# Patient Record
Sex: FEMALE | Race: BLACK OR AFRICAN AMERICAN | NOT HISPANIC OR LATINO | Employment: OTHER | URBAN - METROPOLITAN AREA
[De-identification: names, ages, dates, MRNs, and addresses within clinical notes are randomized per-mention and may not be internally consistent; named-entity substitution may affect disease eponyms.]

---

## 2021-06-16 ENCOUNTER — OFFICE VISIT (OUTPATIENT)
Dept: PRIMARY CARE CLINIC | Facility: CLINIC | Age: 81
End: 2021-06-16
Payer: MEDICARE

## 2021-06-16 ENCOUNTER — LAB VISIT (OUTPATIENT)
Dept: LAB | Facility: HOSPITAL | Age: 81
End: 2021-06-16
Attending: FAMILY MEDICINE
Payer: MEDICARE

## 2021-06-16 VITALS
HEIGHT: 67 IN | WEIGHT: 151 LBS | HEART RATE: 75 BPM | DIASTOLIC BLOOD PRESSURE: 67 MMHG | BODY MASS INDEX: 23.7 KG/M2 | SYSTOLIC BLOOD PRESSURE: 156 MMHG | OXYGEN SATURATION: 98 % | TEMPERATURE: 98 F

## 2021-06-16 DIAGNOSIS — N18.31 STAGE 3A CHRONIC KIDNEY DISEASE: ICD-10-CM

## 2021-06-16 DIAGNOSIS — I15.2 HYPERTENSION ASSOCIATED WITH TYPE 2 DIABETES MELLITUS: Primary | ICD-10-CM

## 2021-06-16 DIAGNOSIS — E11.69 CONTROLLED TYPE 2 DIABETES MELLITUS WITH OTHER SPECIFIED COMPLICATION, WITHOUT LONG-TERM CURRENT USE OF INSULIN: ICD-10-CM

## 2021-06-16 DIAGNOSIS — E11.59 HYPERTENSION ASSOCIATED WITH TYPE 2 DIABETES MELLITUS: Primary | ICD-10-CM

## 2021-06-16 DIAGNOSIS — E11.42 DM TYPE 2 WITH DIABETIC PERIPHERAL NEUROPATHY: ICD-10-CM

## 2021-06-16 DIAGNOSIS — I10 WHITE COAT SYNDROME WITH DIAGNOSIS OF HYPERTENSION: ICD-10-CM

## 2021-06-16 DIAGNOSIS — E11.59 HYPERTENSION ASSOCIATED WITH TYPE 2 DIABETES MELLITUS: ICD-10-CM

## 2021-06-16 DIAGNOSIS — I15.2 HYPERTENSION ASSOCIATED WITH TYPE 2 DIABETES MELLITUS: ICD-10-CM

## 2021-06-16 DIAGNOSIS — E78.2 MIXED HYPERLIPIDEMIA: ICD-10-CM

## 2021-06-16 DIAGNOSIS — I10 ESSENTIAL HYPERTENSION: ICD-10-CM

## 2021-06-16 LAB
BASOPHILS # BLD AUTO: 0.02 K/UL (ref 0–0.2)
BASOPHILS NFR BLD: 0.3 % (ref 0–1.9)
DIFFERENTIAL METHOD: ABNORMAL
EOSINOPHIL # BLD AUTO: 0.1 K/UL (ref 0–0.5)
EOSINOPHIL NFR BLD: 0.8 % (ref 0–8)
ERYTHROCYTE [DISTWIDTH] IN BLOOD BY AUTOMATED COUNT: 12.9 % (ref 11.5–14.5)
ESTIMATED AVG GLUCOSE: 194 MG/DL (ref 68–131)
HBA1C MFR BLD: 8.4 % (ref 4–5.6)
HCT VFR BLD AUTO: 34.9 % (ref 37–48.5)
HGB BLD-MCNC: 11.2 G/DL (ref 12–16)
IMM GRANULOCYTES # BLD AUTO: 0.03 K/UL (ref 0–0.04)
IMM GRANULOCYTES NFR BLD AUTO: 0.5 % (ref 0–0.5)
LYMPHOCYTES # BLD AUTO: 1.8 K/UL (ref 1–4.8)
LYMPHOCYTES NFR BLD: 28 % (ref 18–48)
MCH RBC QN AUTO: 29.6 PG (ref 27–31)
MCHC RBC AUTO-ENTMCNC: 32.1 G/DL (ref 32–36)
MCV RBC AUTO: 92 FL (ref 82–98)
MONOCYTES # BLD AUTO: 0.4 K/UL (ref 0.3–1)
MONOCYTES NFR BLD: 6.4 % (ref 4–15)
NEUTROPHILS # BLD AUTO: 4.1 K/UL (ref 1.8–7.7)
NEUTROPHILS NFR BLD: 64 % (ref 38–73)
NRBC BLD-RTO: 0 /100 WBC
PLATELET # BLD AUTO: 258 K/UL (ref 150–450)
PMV BLD AUTO: 10.4 FL (ref 9.2–12.9)
RBC # BLD AUTO: 3.79 M/UL (ref 4–5.4)
WBC # BLD AUTO: 6.39 K/UL (ref 3.9–12.7)

## 2021-06-16 PROCEDURE — 84443 ASSAY THYROID STIM HORMONE: CPT | Performed by: FAMILY MEDICINE

## 2021-06-16 PROCEDURE — 99999 PR PBB SHADOW E&M-NEW PATIENT-LVL III: CPT | Mod: PBBFAC,,, | Performed by: FAMILY MEDICINE

## 2021-06-16 PROCEDURE — 99999 PR PBB SHADOW E&M-NEW PATIENT-LVL III: ICD-10-PCS | Mod: PBBFAC,,, | Performed by: FAMILY MEDICINE

## 2021-06-16 PROCEDURE — 36415 COLL VENOUS BLD VENIPUNCTURE: CPT | Mod: PN | Performed by: FAMILY MEDICINE

## 2021-06-16 PROCEDURE — 85025 COMPLETE CBC W/AUTO DIFF WBC: CPT | Performed by: FAMILY MEDICINE

## 2021-06-16 PROCEDURE — 99203 OFFICE O/P NEW LOW 30 MIN: CPT | Mod: PBBFAC,PN | Performed by: FAMILY MEDICINE

## 2021-06-16 PROCEDURE — 99204 PR OFFICE/OUTPT VISIT, NEW, LEVL IV, 45-59 MIN: ICD-10-PCS | Mod: S$PBB,,, | Performed by: FAMILY MEDICINE

## 2021-06-16 PROCEDURE — 80053 COMPREHEN METABOLIC PANEL: CPT | Performed by: FAMILY MEDICINE

## 2021-06-16 PROCEDURE — 83036 HEMOGLOBIN GLYCOSYLATED A1C: CPT | Performed by: FAMILY MEDICINE

## 2021-06-16 PROCEDURE — 80061 LIPID PANEL: CPT | Performed by: FAMILY MEDICINE

## 2021-06-16 PROCEDURE — 99204 OFFICE O/P NEW MOD 45 MIN: CPT | Mod: S$PBB,,, | Performed by: FAMILY MEDICINE

## 2021-06-16 RX ORDER — VALSARTAN AND HYDROCHLOROTHIAZIDE 320; 12.5 MG/1; MG/1
1 TABLET, FILM COATED ORAL DAILY
Qty: 90 TABLET | Refills: 1 | Status: SHIPPED | OUTPATIENT
Start: 2021-06-16 | End: 2021-12-01 | Stop reason: SDUPTHER

## 2021-06-16 RX ORDER — ATORVASTATIN CALCIUM 20 MG/1
20 TABLET, FILM COATED ORAL DAILY
COMMUNITY
End: 2021-06-16 | Stop reason: SDUPTHER

## 2021-06-16 RX ORDER — AMLODIPINE BESYLATE 10 MG/1
10 TABLET ORAL DAILY
COMMUNITY
End: 2021-06-16 | Stop reason: SDUPTHER

## 2021-06-16 RX ORDER — ATORVASTATIN CALCIUM 20 MG/1
20 TABLET, FILM COATED ORAL NIGHTLY
Qty: 90 TABLET | Refills: 1 | Status: SHIPPED | OUTPATIENT
Start: 2021-06-16 | End: 2021-12-01 | Stop reason: SDUPTHER

## 2021-06-16 RX ORDER — LINAGLIPTIN 5 MG/1
5 TABLET, FILM COATED ORAL DAILY
Qty: 90 TABLET | Refills: 2 | Status: SHIPPED | OUTPATIENT
Start: 2021-06-16 | End: 2021-12-01 | Stop reason: SDUPTHER

## 2021-06-16 RX ORDER — GABAPENTIN 100 MG/1
100 CAPSULE ORAL 3 TIMES DAILY
COMMUNITY
End: 2021-06-16 | Stop reason: SDUPTHER

## 2021-06-16 RX ORDER — AMLODIPINE BESYLATE 10 MG/1
10 TABLET ORAL DAILY
Qty: 30 TABLET | Refills: 2 | Status: SHIPPED | OUTPATIENT
Start: 2021-06-16 | End: 2021-12-01 | Stop reason: SDUPTHER

## 2021-06-16 RX ORDER — GLIMEPIRIDE 4 MG/1
4 TABLET ORAL
Qty: 90 TABLET | Refills: 1 | Status: SHIPPED | OUTPATIENT
Start: 2021-06-16 | End: 2021-12-01 | Stop reason: SDUPTHER

## 2021-06-16 RX ORDER — CLONIDINE 0.3 MG/24H
1 PATCH, EXTENDED RELEASE TRANSDERMAL
Qty: 12 PATCH | Refills: 1 | Status: SHIPPED | OUTPATIENT
Start: 2021-06-16 | End: 2021-12-01 | Stop reason: SDUPTHER

## 2021-06-16 RX ORDER — GABAPENTIN 100 MG/1
100 CAPSULE ORAL 2 TIMES DAILY
Qty: 60 CAPSULE | Refills: 1 | Status: SHIPPED | OUTPATIENT
Start: 2021-06-16 | End: 2021-08-13 | Stop reason: ALTCHOICE

## 2021-06-17 LAB
ALBUMIN SERPL BCP-MCNC: 4 G/DL (ref 3.5–5.2)
ALP SERPL-CCNC: 83 U/L (ref 55–135)
ALT SERPL W/O P-5'-P-CCNC: 32 U/L (ref 10–44)
ANION GAP SERPL CALC-SCNC: 12 MMOL/L (ref 8–16)
AST SERPL-CCNC: 25 U/L (ref 10–40)
BILIRUB SERPL-MCNC: 0.3 MG/DL (ref 0.1–1)
BUN SERPL-MCNC: 29 MG/DL (ref 8–23)
CALCIUM SERPL-MCNC: 10 MG/DL (ref 8.7–10.5)
CHLORIDE SERPL-SCNC: 108 MMOL/L (ref 95–110)
CHOLEST SERPL-MCNC: 159 MG/DL (ref 120–199)
CHOLEST/HDLC SERPL: 2.7 {RATIO} (ref 2–5)
CO2 SERPL-SCNC: 21 MMOL/L (ref 23–29)
CREAT SERPL-MCNC: 1.1 MG/DL (ref 0.5–1.4)
EST. GFR  (AFRICAN AMERICAN): 54.4 ML/MIN/1.73 M^2
EST. GFR  (NON AFRICAN AMERICAN): 47.2 ML/MIN/1.73 M^2
GLUCOSE SERPL-MCNC: 208 MG/DL (ref 70–110)
HDLC SERPL-MCNC: 58 MG/DL (ref 40–75)
HDLC SERPL: 36.5 % (ref 20–50)
LDLC SERPL CALC-MCNC: 86.2 MG/DL (ref 63–159)
NONHDLC SERPL-MCNC: 101 MG/DL
POTASSIUM SERPL-SCNC: 4.8 MMOL/L (ref 3.5–5.1)
PROT SERPL-MCNC: 8.1 G/DL (ref 6–8.4)
SODIUM SERPL-SCNC: 141 MMOL/L (ref 136–145)
TRIGL SERPL-MCNC: 74 MG/DL (ref 30–150)
TSH SERPL DL<=0.005 MIU/L-ACNC: 2 UIU/ML (ref 0.4–4)

## 2021-06-21 ENCOUNTER — TELEPHONE (OUTPATIENT)
Dept: PRIMARY CARE CLINIC | Facility: CLINIC | Age: 81
End: 2021-06-21

## 2021-06-24 ENCOUNTER — TELEPHONE (OUTPATIENT)
Dept: PRIMARY CARE CLINIC | Facility: CLINIC | Age: 81
End: 2021-06-24

## 2021-07-02 ENCOUNTER — LAB VISIT (OUTPATIENT)
Dept: LAB | Facility: HOSPITAL | Age: 81
End: 2021-07-02
Attending: FAMILY MEDICINE
Payer: MEDICARE

## 2021-07-02 ENCOUNTER — OFFICE VISIT (OUTPATIENT)
Dept: PRIMARY CARE CLINIC | Facility: CLINIC | Age: 81
End: 2021-07-02
Payer: MEDICARE

## 2021-07-02 VITALS
WEIGHT: 152.81 LBS | HEART RATE: 65 BPM | OXYGEN SATURATION: 97 % | TEMPERATURE: 98 F | BODY MASS INDEX: 23.98 KG/M2 | DIASTOLIC BLOOD PRESSURE: 78 MMHG | SYSTOLIC BLOOD PRESSURE: 185 MMHG | HEIGHT: 67 IN

## 2021-07-02 DIAGNOSIS — E11.42 DM TYPE 2 WITH DIABETIC PERIPHERAL NEUROPATHY: Primary | ICD-10-CM

## 2021-07-02 DIAGNOSIS — N18.31 STAGE 3A CHRONIC KIDNEY DISEASE: ICD-10-CM

## 2021-07-02 DIAGNOSIS — I10 ESSENTIAL HYPERTENSION: ICD-10-CM

## 2021-07-02 DIAGNOSIS — E11.42 DM TYPE 2 WITH DIABETIC PERIPHERAL NEUROPATHY: ICD-10-CM

## 2021-07-02 PROCEDURE — 99214 OFFICE O/P EST MOD 30 MIN: CPT | Mod: PBBFAC,PN | Performed by: FAMILY MEDICINE

## 2021-07-02 PROCEDURE — 99999 PR PBB SHADOW E&M-EST. PATIENT-LVL IV: ICD-10-PCS | Mod: PBBFAC,,, | Performed by: FAMILY MEDICINE

## 2021-07-02 PROCEDURE — 99214 OFFICE O/P EST MOD 30 MIN: CPT | Mod: S$PBB,,, | Performed by: FAMILY MEDICINE

## 2021-07-02 PROCEDURE — 99999 PR PBB SHADOW E&M-EST. PATIENT-LVL IV: CPT | Mod: PBBFAC,,, | Performed by: FAMILY MEDICINE

## 2021-07-02 PROCEDURE — 99214 PR OFFICE/OUTPT VISIT, EST, LEVL IV, 30-39 MIN: ICD-10-PCS | Mod: S$PBB,,, | Performed by: FAMILY MEDICINE

## 2021-07-30 ENCOUNTER — PATIENT OUTREACH (OUTPATIENT)
Dept: ADMINISTRATIVE | Facility: HOSPITAL | Age: 81
End: 2021-07-30

## 2021-08-13 ENCOUNTER — OFFICE VISIT (OUTPATIENT)
Dept: PRIMARY CARE CLINIC | Facility: CLINIC | Age: 81
End: 2021-08-13
Payer: MEDICARE

## 2021-08-13 DIAGNOSIS — I12.9 HYPERTENSION ASSOCIATED WITH STAGE 3 CHRONIC KIDNEY DISEASE DUE TO TYPE 2 DIABETES MELLITUS: ICD-10-CM

## 2021-08-13 DIAGNOSIS — N18.30 HYPERTENSION ASSOCIATED WITH STAGE 3 CHRONIC KIDNEY DISEASE DUE TO TYPE 2 DIABETES MELLITUS: ICD-10-CM

## 2021-08-13 DIAGNOSIS — E11.42 PERIPHERAL SENSORY NEUROPATHY DUE TO TYPE 2 DIABETES MELLITUS: Primary | ICD-10-CM

## 2021-08-13 DIAGNOSIS — N18.31 STAGE 3A CHRONIC KIDNEY DISEASE: ICD-10-CM

## 2021-08-13 DIAGNOSIS — I10 ESSENTIAL HYPERTENSION: ICD-10-CM

## 2021-08-13 DIAGNOSIS — Z91.81 AT HIGH RISK FOR FALLS: ICD-10-CM

## 2021-08-13 DIAGNOSIS — E11.22 HYPERTENSION ASSOCIATED WITH STAGE 3 CHRONIC KIDNEY DISEASE DUE TO TYPE 2 DIABETES MELLITUS: ICD-10-CM

## 2021-08-13 PROCEDURE — 99214 OFFICE O/P EST MOD 30 MIN: CPT | Mod: 95,,, | Performed by: FAMILY MEDICINE

## 2021-08-13 PROCEDURE — 99214 PR OFFICE/OUTPT VISIT, EST, LEVL IV, 30-39 MIN: ICD-10-PCS | Mod: 95,,, | Performed by: FAMILY MEDICINE

## 2021-08-13 RX ORDER — PREGABALIN 75 MG/1
75 CAPSULE ORAL 2 TIMES DAILY
Qty: 60 CAPSULE | Refills: 11 | Status: SHIPPED | OUTPATIENT
Start: 2021-08-13 | End: 2021-12-01 | Stop reason: SDUPTHER

## 2021-08-20 LAB
LEFT EYE DM RETINOPATHY: NEGATIVE
RIGHT EYE DM RETINOPATHY: NEGATIVE

## 2021-09-13 DIAGNOSIS — I10 ESSENTIAL HYPERTENSION: ICD-10-CM

## 2021-09-13 DIAGNOSIS — E11.59 HYPERTENSION ASSOCIATED WITH TYPE 2 DIABETES MELLITUS: ICD-10-CM

## 2021-09-13 DIAGNOSIS — I15.2 HYPERTENSION ASSOCIATED WITH TYPE 2 DIABETES MELLITUS: ICD-10-CM

## 2021-09-13 DIAGNOSIS — E11.69 CONTROLLED TYPE 2 DIABETES MELLITUS WITH OTHER SPECIFIED COMPLICATION, WITHOUT LONG-TERM CURRENT USE OF INSULIN: ICD-10-CM

## 2021-09-13 DIAGNOSIS — E78.2 MIXED HYPERLIPIDEMIA: ICD-10-CM

## 2021-09-13 RX ORDER — GABAPENTIN 100 MG/1
100 CAPSULE ORAL 2 TIMES DAILY
Qty: 60 CAPSULE | Refills: 1 | OUTPATIENT
Start: 2021-09-13 | End: 2021-10-13

## 2021-09-13 RX ORDER — ATORVASTATIN CALCIUM 20 MG/1
20 TABLET, FILM COATED ORAL NIGHTLY
Qty: 90 TABLET | Refills: 0 | OUTPATIENT
Start: 2021-09-13 | End: 2021-12-12

## 2021-09-13 RX ORDER — CLONIDINE 0.3 MG/24H
1 PATCH, EXTENDED RELEASE TRANSDERMAL
Qty: 12 PATCH | Refills: 1 | OUTPATIENT
Start: 2021-09-13 | End: 2021-10-13

## 2021-09-13 RX ORDER — AMLODIPINE BESYLATE 10 MG/1
10 TABLET ORAL DAILY
Qty: 90 TABLET | Refills: 0 | OUTPATIENT
Start: 2021-09-13 | End: 2021-12-12

## 2021-09-13 RX ORDER — LINAGLIPTIN 5 MG/1
5 TABLET, FILM COATED ORAL DAILY
Qty: 90 TABLET | Refills: 0 | OUTPATIENT
Start: 2021-09-13 | End: 2021-12-12

## 2021-09-13 RX ORDER — GLIMEPIRIDE 4 MG/1
4 TABLET ORAL
Qty: 90 TABLET | Refills: 1 | OUTPATIENT
Start: 2021-09-13 | End: 2021-12-12

## 2021-09-13 RX ORDER — VALSARTAN AND HYDROCHLOROTHIAZIDE 320; 12.5 MG/1; MG/1
1 TABLET, FILM COATED ORAL DAILY
Qty: 90 TABLET | Refills: 1 | OUTPATIENT
Start: 2021-09-13 | End: 2021-12-12

## 2021-10-01 ENCOUNTER — OFFICE VISIT (OUTPATIENT)
Dept: PRIMARY CARE CLINIC | Facility: CLINIC | Age: 81
End: 2021-10-01
Payer: MEDICARE

## 2021-10-01 VITALS
TEMPERATURE: 97 F | SYSTOLIC BLOOD PRESSURE: 176 MMHG | WEIGHT: 155.63 LBS | HEIGHT: 69 IN | DIASTOLIC BLOOD PRESSURE: 72 MMHG | OXYGEN SATURATION: 97 % | HEART RATE: 63 BPM | BODY MASS INDEX: 23.05 KG/M2

## 2021-10-01 DIAGNOSIS — I12.9 HYPERTENSION ASSOCIATED WITH STAGE 3 CHRONIC KIDNEY DISEASE DUE TO TYPE 2 DIABETES MELLITUS: Primary | ICD-10-CM

## 2021-10-01 DIAGNOSIS — N18.30 HYPERTENSION ASSOCIATED WITH STAGE 3 CHRONIC KIDNEY DISEASE DUE TO TYPE 2 DIABETES MELLITUS: Primary | ICD-10-CM

## 2021-10-01 DIAGNOSIS — E11.22 HYPERTENSION ASSOCIATED WITH STAGE 3 CHRONIC KIDNEY DISEASE DUE TO TYPE 2 DIABETES MELLITUS: Primary | ICD-10-CM

## 2021-10-01 DIAGNOSIS — N18.31 STAGE 3A CHRONIC KIDNEY DISEASE: ICD-10-CM

## 2021-10-01 DIAGNOSIS — E11.42 PERIPHERAL SENSORY NEUROPATHY DUE TO TYPE 2 DIABETES MELLITUS: ICD-10-CM

## 2021-10-01 DIAGNOSIS — M25.461 EFFUSION, RIGHT KNEE: ICD-10-CM

## 2021-10-01 DIAGNOSIS — M70.51 BURSITIS OF OTHER BURSA OF RIGHT KNEE: ICD-10-CM

## 2021-10-01 PROCEDURE — 99213 OFFICE O/P EST LOW 20 MIN: CPT | Mod: S$PBB,,, | Performed by: NURSE PRACTITIONER

## 2021-10-01 PROCEDURE — 99999 PR PBB SHADOW E&M-EST. PATIENT-LVL IV: CPT | Mod: PBBFAC,,, | Performed by: NURSE PRACTITIONER

## 2021-10-01 PROCEDURE — 99999 PR PBB SHADOW E&M-EST. PATIENT-LVL IV: ICD-10-PCS | Mod: PBBFAC,,, | Performed by: NURSE PRACTITIONER

## 2021-10-01 PROCEDURE — 99213 PR OFFICE/OUTPT VISIT, EST, LEVL III, 20-29 MIN: ICD-10-PCS | Mod: S$PBB,,, | Performed by: NURSE PRACTITIONER

## 2021-10-01 PROCEDURE — 99214 OFFICE O/P EST MOD 30 MIN: CPT | Mod: PBBFAC,PN | Performed by: NURSE PRACTITIONER

## 2021-10-01 RX ORDER — TRAMADOL HYDROCHLORIDE 50 MG/1
50 TABLET ORAL EVERY 12 HOURS PRN
Qty: 14 TABLET | Refills: 0 | Status: SHIPPED | OUTPATIENT
Start: 2021-10-01 | End: 2021-10-08

## 2021-10-01 RX ORDER — GABAPENTIN 100 MG/1
100 CAPSULE ORAL 3 TIMES DAILY
COMMUNITY
End: 2021-12-01

## 2021-11-17 ENCOUNTER — PATIENT OUTREACH (OUTPATIENT)
Dept: ADMINISTRATIVE | Facility: HOSPITAL | Age: 81
End: 2021-11-17
Payer: MEDICARE

## 2021-11-19 ENCOUNTER — PATIENT OUTREACH (OUTPATIENT)
Dept: ADMINISTRATIVE | Facility: HOSPITAL | Age: 81
End: 2021-11-19
Payer: MEDICARE

## 2021-12-01 ENCOUNTER — OFFICE VISIT (OUTPATIENT)
Dept: PRIMARY CARE CLINIC | Facility: CLINIC | Age: 81
End: 2021-12-01
Payer: MEDICARE

## 2021-12-01 ENCOUNTER — LAB VISIT (OUTPATIENT)
Dept: LAB | Facility: HOSPITAL | Age: 81
End: 2021-12-01
Attending: NURSE PRACTITIONER
Payer: MEDICARE

## 2021-12-01 VITALS
HEART RATE: 91 BPM | WEIGHT: 159.38 LBS | HEIGHT: 69 IN | SYSTOLIC BLOOD PRESSURE: 181 MMHG | TEMPERATURE: 99 F | OXYGEN SATURATION: 98 % | DIASTOLIC BLOOD PRESSURE: 81 MMHG | BODY MASS INDEX: 23.6 KG/M2

## 2021-12-01 DIAGNOSIS — I10 ESSENTIAL HYPERTENSION: ICD-10-CM

## 2021-12-01 DIAGNOSIS — E11.59 HYPERTENSION ASSOCIATED WITH TYPE 2 DIABETES MELLITUS: ICD-10-CM

## 2021-12-01 DIAGNOSIS — Z12.31 BREAST CANCER SCREENING BY MAMMOGRAM: ICD-10-CM

## 2021-12-01 DIAGNOSIS — I15.2 HYPERTENSION ASSOCIATED WITH TYPE 2 DIABETES MELLITUS: ICD-10-CM

## 2021-12-01 DIAGNOSIS — E78.2 MIXED HYPERLIPIDEMIA: ICD-10-CM

## 2021-12-01 DIAGNOSIS — E11.69 CONTROLLED TYPE 2 DIABETES MELLITUS WITH OTHER SPECIFIED COMPLICATION, WITHOUT LONG-TERM CURRENT USE OF INSULIN: ICD-10-CM

## 2021-12-01 DIAGNOSIS — Z91.81 AT HIGH RISK FOR FALLS: ICD-10-CM

## 2021-12-01 DIAGNOSIS — N18.31 STAGE 3A CHRONIC KIDNEY DISEASE: ICD-10-CM

## 2021-12-01 DIAGNOSIS — E11.42 PERIPHERAL SENSORY NEUROPATHY DUE TO TYPE 2 DIABETES MELLITUS: ICD-10-CM

## 2021-12-01 DIAGNOSIS — Z12.39 ENCOUNTER FOR SCREENING FOR MALIGNANT NEOPLASM OF BREAST, UNSPECIFIED SCREENING MODALITY: Primary | ICD-10-CM

## 2021-12-01 LAB
ALBUMIN SERPL BCP-MCNC: 4.1 G/DL (ref 3.5–5.2)
ALP SERPL-CCNC: 99 U/L (ref 55–135)
ALT SERPL W/O P-5'-P-CCNC: 25 U/L (ref 10–44)
ANION GAP SERPL CALC-SCNC: 12 MMOL/L (ref 8–16)
AST SERPL-CCNC: 24 U/L (ref 10–40)
BASOPHILS # BLD AUTO: 0.03 K/UL (ref 0–0.2)
BASOPHILS NFR BLD: 0.4 % (ref 0–1.9)
BILIRUB SERPL-MCNC: 0.1 MG/DL (ref 0.1–1)
BUN SERPL-MCNC: 28 MG/DL (ref 8–23)
CALCIUM SERPL-MCNC: 10.2 MG/DL (ref 8.7–10.5)
CHLORIDE SERPL-SCNC: 107 MMOL/L (ref 95–110)
CHOLEST SERPL-MCNC: 176 MG/DL (ref 120–199)
CHOLEST/HDLC SERPL: 2.6 {RATIO} (ref 2–5)
CO2 SERPL-SCNC: 23 MMOL/L (ref 23–29)
CREAT SERPL-MCNC: 1.1 MG/DL (ref 0.5–1.4)
DIFFERENTIAL METHOD: ABNORMAL
EOSINOPHIL # BLD AUTO: 0.1 K/UL (ref 0–0.5)
EOSINOPHIL NFR BLD: 1.2 % (ref 0–8)
ERYTHROCYTE [DISTWIDTH] IN BLOOD BY AUTOMATED COUNT: 12.8 % (ref 11.5–14.5)
EST. GFR  (AFRICAN AMERICAN): 54 ML/MIN/1.73 M^2
EST. GFR  (NON AFRICAN AMERICAN): 46.9 ML/MIN/1.73 M^2
ESTIMATED AVG GLUCOSE: 200 MG/DL (ref 68–131)
GLUCOSE SERPL-MCNC: 181 MG/DL (ref 70–110)
HBA1C MFR BLD: 8.6 % (ref 4–5.6)
HCT VFR BLD AUTO: 37.3 % (ref 37–48.5)
HDLC SERPL-MCNC: 69 MG/DL (ref 40–75)
HDLC SERPL: 39.2 % (ref 20–50)
HGB BLD-MCNC: 11.7 G/DL (ref 12–16)
IMM GRANULOCYTES # BLD AUTO: 0.03 K/UL (ref 0–0.04)
IMM GRANULOCYTES NFR BLD AUTO: 0.4 % (ref 0–0.5)
LDLC SERPL CALC-MCNC: 96.8 MG/DL (ref 63–159)
LYMPHOCYTES # BLD AUTO: 1.9 K/UL (ref 1–4.8)
LYMPHOCYTES NFR BLD: 27.8 % (ref 18–48)
MCH RBC QN AUTO: 29 PG (ref 27–31)
MCHC RBC AUTO-ENTMCNC: 31.4 G/DL (ref 32–36)
MCV RBC AUTO: 93 FL (ref 82–98)
MONOCYTES # BLD AUTO: 0.5 K/UL (ref 0.3–1)
MONOCYTES NFR BLD: 7.8 % (ref 4–15)
NEUTROPHILS # BLD AUTO: 4.2 K/UL (ref 1.8–7.7)
NEUTROPHILS NFR BLD: 62.4 % (ref 38–73)
NONHDLC SERPL-MCNC: 107 MG/DL
NRBC BLD-RTO: 0 /100 WBC
PLATELET # BLD AUTO: 230 K/UL (ref 150–450)
PMV BLD AUTO: 10.4 FL (ref 9.2–12.9)
POTASSIUM SERPL-SCNC: 4.4 MMOL/L (ref 3.5–5.1)
PROT SERPL-MCNC: 7.9 G/DL (ref 6–8.4)
RBC # BLD AUTO: 4.03 M/UL (ref 4–5.4)
SODIUM SERPL-SCNC: 142 MMOL/L (ref 136–145)
T3FREE SERPL-MCNC: 2.6 PG/ML (ref 2.3–4.2)
T4 FREE SERPL-MCNC: 0.8 NG/DL (ref 0.71–1.51)
TRIGL SERPL-MCNC: 51 MG/DL (ref 30–150)
TSH SERPL DL<=0.005 MIU/L-ACNC: 2.02 UIU/ML (ref 0.4–4)
WBC # BLD AUTO: 6.79 K/UL (ref 3.9–12.7)

## 2021-12-01 PROCEDURE — 90694 VACC AIIV4 NO PRSRV 0.5ML IM: CPT | Mod: PBBFAC,PN

## 2021-12-01 PROCEDURE — 84439 ASSAY OF FREE THYROXINE: CPT | Performed by: NURSE PRACTITIONER

## 2021-12-01 PROCEDURE — 80053 COMPREHEN METABOLIC PANEL: CPT | Performed by: NURSE PRACTITIONER

## 2021-12-01 PROCEDURE — 80061 LIPID PANEL: CPT | Performed by: NURSE PRACTITIONER

## 2021-12-01 PROCEDURE — 99214 PR OFFICE/OUTPT VISIT, EST, LEVL IV, 30-39 MIN: ICD-10-PCS | Mod: S$PBB,,, | Performed by: NURSE PRACTITIONER

## 2021-12-01 PROCEDURE — 36415 COLL VENOUS BLD VENIPUNCTURE: CPT | Mod: PN | Performed by: NURSE PRACTITIONER

## 2021-12-01 PROCEDURE — 84481 FREE ASSAY (FT-3): CPT | Performed by: NURSE PRACTITIONER

## 2021-12-01 PROCEDURE — 99999 PR PBB SHADOW E&M-EST. PATIENT-LVL III: ICD-10-PCS | Mod: PBBFAC,,, | Performed by: NURSE PRACTITIONER

## 2021-12-01 PROCEDURE — 99214 OFFICE O/P EST MOD 30 MIN: CPT | Mod: S$PBB,,, | Performed by: NURSE PRACTITIONER

## 2021-12-01 PROCEDURE — 99213 OFFICE O/P EST LOW 20 MIN: CPT | Mod: PBBFAC,PN,25 | Performed by: NURSE PRACTITIONER

## 2021-12-01 PROCEDURE — 84443 ASSAY THYROID STIM HORMONE: CPT | Performed by: NURSE PRACTITIONER

## 2021-12-01 PROCEDURE — 83036 HEMOGLOBIN GLYCOSYLATED A1C: CPT | Performed by: NURSE PRACTITIONER

## 2021-12-01 PROCEDURE — 99999 PR PBB SHADOW E&M-EST. PATIENT-LVL III: CPT | Mod: PBBFAC,,, | Performed by: NURSE PRACTITIONER

## 2021-12-01 PROCEDURE — G0008 ADMIN INFLUENZA VIRUS VAC: HCPCS | Mod: PBBFAC,PN

## 2021-12-01 PROCEDURE — 85025 COMPLETE CBC W/AUTO DIFF WBC: CPT | Performed by: NURSE PRACTITIONER

## 2021-12-01 RX ORDER — VALSARTAN AND HYDROCHLOROTHIAZIDE 320; 12.5 MG/1; MG/1
1 TABLET, FILM COATED ORAL DAILY
Qty: 90 TABLET | Refills: 1 | Status: SHIPPED | OUTPATIENT
Start: 2021-12-01 | End: 2022-06-08

## 2021-12-01 RX ORDER — AMLODIPINE BESYLATE 10 MG/1
10 TABLET ORAL DAILY
Qty: 90 TABLET | Refills: 1 | Status: SHIPPED | OUTPATIENT
Start: 2021-12-01 | End: 2022-06-08

## 2021-12-01 RX ORDER — LINAGLIPTIN 5 MG/1
5 TABLET, FILM COATED ORAL DAILY
Qty: 90 TABLET | Refills: 1 | Status: SHIPPED | OUTPATIENT
Start: 2021-12-01 | End: 2022-06-20

## 2021-12-01 RX ORDER — GLIMEPIRIDE 4 MG/1
4 TABLET ORAL
Qty: 90 TABLET | Refills: 1 | Status: SHIPPED | OUTPATIENT
Start: 2021-12-01 | End: 2022-06-08

## 2021-12-01 RX ORDER — PREGABALIN 75 MG/1
75 CAPSULE ORAL 2 TIMES DAILY
Qty: 180 CAPSULE | Refills: 1 | Status: SHIPPED | OUTPATIENT
Start: 2021-12-01 | End: 2022-10-21 | Stop reason: SDUPTHER

## 2021-12-01 RX ORDER — CLONIDINE 0.3 MG/24H
1 PATCH, EXTENDED RELEASE TRANSDERMAL
Qty: 12 PATCH | Refills: 3 | Status: SHIPPED | OUTPATIENT
Start: 2021-12-01 | End: 2022-07-15 | Stop reason: SDUPTHER

## 2021-12-01 RX ORDER — ATORVASTATIN CALCIUM 20 MG/1
20 TABLET, FILM COATED ORAL NIGHTLY
Qty: 90 TABLET | Refills: 1 | Status: SHIPPED | OUTPATIENT
Start: 2021-12-01 | End: 2022-07-01

## 2022-01-06 ENCOUNTER — HOSPITAL ENCOUNTER (OUTPATIENT)
Dept: RADIOLOGY | Facility: HOSPITAL | Age: 82
Discharge: HOME | End: 2022-01-06
Attending: NURSE PRACTITIONER
Payer: MEDICARE

## 2022-01-06 VITALS — WEIGHT: 159 LBS | BODY MASS INDEX: 23.55 KG/M2 | HEIGHT: 69 IN

## 2022-01-06 DIAGNOSIS — Z12.39 ENCOUNTER FOR SCREENING FOR MALIGNANT NEOPLASM OF BREAST, UNSPECIFIED SCREENING MODALITY: ICD-10-CM

## 2022-01-06 DIAGNOSIS — Z12.31 BREAST CANCER SCREENING BY MAMMOGRAM: ICD-10-CM

## 2022-01-06 PROCEDURE — 77067 SCR MAMMO BI INCL CAD: CPT | Mod: TC

## 2022-01-06 PROCEDURE — 77067 MAMMO DIGITAL SCREENING BILAT WITH TOMO: ICD-10-PCS | Mod: 26,,, | Performed by: RADIOLOGY

## 2022-01-06 PROCEDURE — 77063 BREAST TOMOSYNTHESIS BI: CPT | Mod: 26,,, | Performed by: RADIOLOGY

## 2022-01-06 PROCEDURE — 77063 MAMMO DIGITAL SCREENING BILAT WITH TOMO: ICD-10-PCS | Mod: 26,,, | Performed by: RADIOLOGY

## 2022-01-06 PROCEDURE — 77067 SCR MAMMO BI INCL CAD: CPT | Mod: 26,,, | Performed by: RADIOLOGY

## 2022-01-06 PROCEDURE — 77063 BREAST TOMOSYNTHESIS BI: CPT | Mod: TC

## 2022-02-28 ENCOUNTER — PES CALL (OUTPATIENT)
Dept: ADMINISTRATIVE | Facility: CLINIC | Age: 82
End: 2022-02-28
Payer: MEDICARE

## 2022-03-15 ENCOUNTER — PES CALL (OUTPATIENT)
Dept: ADMINISTRATIVE | Facility: CLINIC | Age: 82
End: 2022-03-15
Payer: MEDICARE

## 2022-05-16 ENCOUNTER — PES CALL (OUTPATIENT)
Dept: ADMINISTRATIVE | Facility: CLINIC | Age: 82
End: 2022-05-16
Payer: MEDICARE

## 2022-05-30 ENCOUNTER — PATIENT MESSAGE (OUTPATIENT)
Dept: ADMINISTRATIVE | Facility: HOSPITAL | Age: 82
End: 2022-05-30
Payer: MEDICARE

## 2022-06-02 ENCOUNTER — OFFICE VISIT (OUTPATIENT)
Dept: HOME HEALTH SERVICES | Facility: CLINIC | Age: 82
End: 2022-06-02
Payer: MEDICARE

## 2022-06-02 VITALS
BODY MASS INDEX: 23.55 KG/M2 | OXYGEN SATURATION: 98 % | HEART RATE: 59 BPM | HEIGHT: 69 IN | TEMPERATURE: 97 F | WEIGHT: 159 LBS | DIASTOLIC BLOOD PRESSURE: 69 MMHG | SYSTOLIC BLOOD PRESSURE: 142 MMHG

## 2022-06-02 DIAGNOSIS — Z78.0 POSTMENOPAUSAL: ICD-10-CM

## 2022-06-02 DIAGNOSIS — Z00.00 ENCOUNTER FOR PREVENTIVE HEALTH EXAMINATION: Primary | ICD-10-CM

## 2022-06-02 DIAGNOSIS — N18.31 STAGE 3A CHRONIC KIDNEY DISEASE: ICD-10-CM

## 2022-06-02 DIAGNOSIS — E11.42 PERIPHERAL SENSORY NEUROPATHY DUE TO TYPE 2 DIABETES MELLITUS: ICD-10-CM

## 2022-06-02 DIAGNOSIS — E11.65 UNCONTROLLED TYPE 2 DIABETES MELLITUS WITH HYPERGLYCEMIA: ICD-10-CM

## 2022-06-02 DIAGNOSIS — R26.9 ABNORMALITY OF GAIT AND MOBILITY: ICD-10-CM

## 2022-06-02 DIAGNOSIS — L84 CALLUS OF FOOT: ICD-10-CM

## 2022-06-02 DIAGNOSIS — B35.1 ONYCHOMYCOSIS: ICD-10-CM

## 2022-06-02 DIAGNOSIS — N18.30 HYPERTENSION ASSOCIATED WITH STAGE 3 CHRONIC KIDNEY DISEASE DUE TO TYPE 2 DIABETES MELLITUS: ICD-10-CM

## 2022-06-02 DIAGNOSIS — E78.2 MIXED HYPERLIPIDEMIA: ICD-10-CM

## 2022-06-02 DIAGNOSIS — R41.3 MEMORY DEFICIT: ICD-10-CM

## 2022-06-02 DIAGNOSIS — E11.22 HYPERTENSION ASSOCIATED WITH STAGE 3 CHRONIC KIDNEY DISEASE DUE TO TYPE 2 DIABETES MELLITUS: ICD-10-CM

## 2022-06-02 DIAGNOSIS — Z85.528 HISTORY OF KIDNEY CANCER: ICD-10-CM

## 2022-06-02 DIAGNOSIS — Z91.81 AT HIGH RISK FOR FALLS: ICD-10-CM

## 2022-06-02 DIAGNOSIS — I12.9 HYPERTENSION ASSOCIATED WITH STAGE 3 CHRONIC KIDNEY DISEASE DUE TO TYPE 2 DIABETES MELLITUS: ICD-10-CM

## 2022-06-02 PROBLEM — I15.2 HYPERTENSION ASSOCIATED WITH TYPE 2 DIABETES MELLITUS: Status: ACTIVE | Noted: 2021-08-13

## 2022-06-02 PROBLEM — E11.9 CONTROLLED TYPE 2 DIABETES MELLITUS, WITHOUT LONG-TERM CURRENT USE OF INSULIN: Status: ACTIVE | Noted: 2022-06-02

## 2022-06-02 PROBLEM — E11.59 HYPERTENSION ASSOCIATED WITH TYPE 2 DIABETES MELLITUS: Status: ACTIVE | Noted: 2021-08-13

## 2022-06-02 PROBLEM — E11.59 HYPERTENSION ASSOCIATED WITH TYPE 2 DIABETES MELLITUS: Status: RESOLVED | Noted: 2021-08-13 | Resolved: 2022-06-02

## 2022-06-02 PROBLEM — I15.2 HYPERTENSION ASSOCIATED WITH TYPE 2 DIABETES MELLITUS: Status: RESOLVED | Noted: 2021-08-13 | Resolved: 2022-06-02

## 2022-06-02 PROCEDURE — G0439 PPPS, SUBSEQ VISIT: HCPCS | Mod: S$GLB,,, | Performed by: NURSE PRACTITIONER

## 2022-06-02 PROCEDURE — G0439 PR MEDICARE ANNUAL WELLNESS SUBSEQUENT VISIT: ICD-10-PCS | Mod: S$GLB,,, | Performed by: NURSE PRACTITIONER

## 2022-06-02 RX ORDER — ASCORBIC ACID 500 MG
500 TABLET ORAL DAILY
COMMUNITY
End: 2022-07-15 | Stop reason: SDUPTHER

## 2022-06-02 RX ORDER — CICLOPIROX 80 MG/ML
SOLUTION TOPICAL NIGHTLY
Qty: 6.6 ML | Refills: 12 | Status: SHIPPED | OUTPATIENT
Start: 2022-06-02 | End: 2022-07-15 | Stop reason: SDUPTHER

## 2022-06-02 RX ORDER — ACETAMINOPHEN 500 MG
2000 TABLET ORAL DAILY
COMMUNITY
End: 2022-07-15 | Stop reason: SDUPTHER

## 2022-06-02 NOTE — PROGRESS NOTES
"Darshana Fortune presented for Medicare AWV today. The following components were reviewed and updated:    · Medical history  · Family History  · Social history  · Allergies and Current Medications  · Health Risk Assessment  · Health Maintenance  · Care Team    **See Completed Assessments for Annual Wellness visit with in the encounter summary    The following assessments were completed:  · Depression Screening  · Cognitive function Screening        · Timed Get Up Test  · Whisper Test    Vitals:    06/02/22 0827 06/02/22 0923   BP: (!) 150/70 (!) 142/69   Pulse: 68 (!) 59   Temp: 97.2 °F (36.2 °C)    TempSrc: Temporal    SpO2: 98%    Weight: 72.1 kg (159 lb)    Height: 5' 9" (1.753 m)      Body mass index is 23.48 kg/m².   ]    Physical Exam  Vitals reviewed.   Constitutional:       Appearance: Normal appearance.   HENT:      Head: Normocephalic and atraumatic.      Nose: Nose normal.      Mouth/Throat:      Mouth: Mucous membranes are moist.      Pharynx: Oropharynx is clear.   Cardiovascular:      Rate and Rhythm: Normal rate and regular rhythm.      Pulses: Normal pulses.           Dorsalis pedis pulses are 2+ on the right side and 2+ on the left side.        Posterior tibial pulses are 2+ on the right side and 2+ on the left side.      Heart sounds: Normal heart sounds.   Pulmonary:      Effort: Pulmonary effort is normal.      Breath sounds: Normal breath sounds.   Musculoskeletal:         General: Normal range of motion.      Cervical back: Normal range of motion and neck supple.      Right foot: Normal range of motion. No deformity, bunion, Charcot foot, foot drop or prominent metatarsal heads.      Left foot: Normal range of motion. No deformity, bunion, Charcot foot, foot drop or prominent metatarsal heads.   Feet:      Right foot:      Protective Sensation: 10 sites tested. 10 sites sensed.      Skin integrity: Callus present. No ulcer, blister, skin breakdown, erythema, warmth, dry skin or fissure.      " Toenail Condition: Right toenails are abnormally thick. Fungal disease present.     Left foot:      Protective Sensation: 10 sites tested. 10 sites sensed.      Skin integrity: Callus present. No ulcer, blister, skin breakdown, erythema, warmth, dry skin or fissure.      Toenail Condition: Left toenails are abnormally thick. Fungal disease present.     Comments: Callus to the ball of the left foot, right great toe, and second left toe  Skin:     General: Skin is warm and dry.   Neurological:      Mental Status: She is alert and oriented to person, place, and time.      Gait: Gait abnormal.   Psychiatric:         Mood and Affect: Mood normal.         Behavior: Behavior normal.          Outpatient Medications Marked as Taking for the 6/2/22 encounter (Office Visit) with JORDY Adan   Medication Sig Dispense Refill    amLODIPine (NORVASC) 10 MG tablet Take 1 tablet (10 mg total) by mouth once daily. 90 tablet 1    ascorbic acid, vitamin C, (VITAMIN C) 500 MG tablet Take 500 mg by mouth once daily.      atorvastatin (LIPITOR) 20 MG tablet Take 1 tablet (20 mg total) by mouth every evening. 90 tablet 1    cholecalciferol, vitamin D3, (VITAMIN D3) 50 mcg (2,000 unit) Cap capsule Take 2,000 Units by mouth once daily.      cloNIDine 0.3 mg/24 hr td ptwk (CATAPRES) 0.3 mg/24 hr Place 1 patch onto the skin every 7 days. 12 patch 3    glimepiride (AMARYL) 4 MG tablet Take 1 tablet (4 mg total) by mouth before breakfast. 90 tablet 1    linaGLIPtin (TRADJENTA) 5 mg Tab tablet Take 1 tablet (5 mg total) by mouth once daily. 90 tablet 1    multivit-min/iron/folic/lutein (CENTRUM SILVER WOMEN ORAL) Take 1 tablet by mouth Daily.      pregabalin (LYRICA) 75 MG capsule Take 1 capsule (75 mg total) by mouth 2 (two) times daily. 180 capsule 1    valsartan-hydrochlorothiazide (DIOVAN-HCT) 320-12.5 mg per tablet Take 1 tablet by mouth once daily. 90 tablet 1        Diagnoses and health risks identified today and  associated recommendations/orders:  1. Encounter for preventive health examination  Medicare awv complete. Health maintenance:  dexa scan due-ordered and message sent to staff to call patient to schedule. Diabetes urine screening ordered previously-message sent to staff to call patient to schedule. HgbA1C due-patient is due for follow up with pcp and will call to schedule an appointment soon. Shingles vaccine due-encouraged patient to obtain. Patient states she will get the covid 19 booster soon.     2. Uncontrolled type 2 diabetes mellitus with hyperglycemia   Latest Reference Range & Units 06/16/21 14:20 12/01/21 12:00   Hemoglobin A1C External 4.0 - 5.6 % 8.4 (H) 8.6 (H)   Estimated Avg Glucose 68 - 131 mg/dL 194 (H) 200 (H)   (H): Data is abnormally high  Uncontrolled. On trdjenta and glimiperide. See med list. Reviewed diabetic diet, diabetic foot care, preferred BS, and HgbA1C levels. Follow up with your PCP as instructed, podiatry and ophthalmology yearly.       3. Hypertension associated with stage 3 chronic kidney disease due to type 2 diabetes mellitus  Chronic and stable on current management. See med list above. Recommend low sodium diet. Follow up with PCP.       4. Peripheral sensory neuropathy due to type 2 diabetes mellitus  Chronic and stable on current management. See med list above. Follow up with PCP.      5. Stage 3a chronic kidney disease   Latest Reference Range & Units 11/28/15 10:00 06/16/21 14:20 12/01/21 12:00   eGFR if African American >60 mL/min/1.73 m^2 56 ! 54.4 ! 54.0 !   !: Data is abnormal  Chronic and stable. No acute issues. Continue current management. Recommend avoid nsaids and other nephrotoxic medications. Follow up with PCP/nephrologist.      6. Mixed hyperlipidemia  Lab Results   Component Value Date    CHOL 176 12/01/2021    CHOL 159 06/16/2021     Lab Results   Component Value Date    HDL 69 12/01/2021    HDL 58 06/16/2021     Lab Results   Component Value Date    LDLCALC  96.8 12/01/2021    LDLCALC 86.2 06/16/2021     Lab Results   Component Value Date    TRIG 51 12/01/2021    TRIG 74 06/16/2021     Lab Results   Component Value Date    CHOLHDL 39.2 12/01/2021    CHOLHDL 36.5 06/16/2021    Chronic and stable on current management. See med list above. Follow up with PCP.      7. Onychomycosis  - ciclopirox (PENLAC) 8 % Soln; Apply topically nightly. On the 7th day, remove with alcohol.  Dispense: 6.6 mL; Refill: 12    8. Callus of foot  - Ambulatory referral/consult to Podiatry; Future    9. History of kidney cancer  S/p surgery. F/u with pcp.     10. Memory deficit  Abnormal cognitive function screening today. F/u with pcp.     11. Postmenopausal  - DXA Bone Density Spine And Hip; Future    12. Abnormality of gait and mobility  Chronic. Fall precautions recommended. Follow up with PCP.      13. At high risk for falls  Chronic. Fall precautions recommended. Follow up with PCP.        bp initially high since she had not taken her bp meds yet today. Took meds and bp improved to 142/69. Informed patient to check bp regularly keep a log and bring bp readings to pcp next visit.     Provided Darshana with a 5-10 year written screening schedule and personal prevention plan. Recommendations were developed using the USPSTF age appropriate recommendations. Education, counseling, and referrals were provided as needed.  After Visit Summary printed and given to patient which includes a list of additional screenings\tests needed.    Follow up in about 1 year (around 6/2/2023) for annual wellness visit.      JORDY Adan    I offered to discuss advanced care planning, including how to pick a person who would make decisions for you if you were unable to make them for yourself, called a health care power of , and what kind of decisions you might make such as use of life sustaining treatments such as ventilators and tube feeding when faced with a life limiting illness recorded on a  living will that they will need to know. (How you want to be cared for as you near the end of your natural life)     X Patient is interested in learning more about how to make advanced directives.  I provided them paperwork and offered to discuss this with them.

## 2022-06-02 NOTE — PATIENT INSTRUCTIONS
Counseling and Referral of Other Preventative  (Italic type indicates deductible and co-insurance are waived)    Patient Name: Darshana Fortune  Today's Date: 6/2/2022    Health Maintenance       Date Due Completion Date    Diabetes Urine Screening Never done ---    Foot Exam Never done ---    DEXA Scan Never done ---    Shingles Vaccine (1 of 2) Never done ---    Hemoglobin A1c 03/01/2022 12/1/2021    COVID-19 Vaccine (3 - Booster for Moderna series) 06/26/2022 1/26/2022    Eye Exam 08/20/2022 8/20/2021    Lipid Panel 12/01/2022 12/1/2021    TETANUS VACCINE 03/03/2030 3/3/2020        Orders Placed This Encounter   Procedures    DXA Bone Density Spine And Hip    Ambulatory referral/consult to Podiatry     The following information is provided to all patients.  This information is to help you find resources for any of the problems found today that may be affecting your health:                Living healthy guide: www.[facility].[place].[facility]      Understanding Diabetes: www.diabetes.org      Eating healthy: www.cdc.gov/healthyweight      [facility] home safety checklist: www.cdc.gov/steadi/patient.html      Agency on Aging: www.goea.[place].[facility]      Alcoholics anonymous (AA): www.aa.org      Physical Activity: www.dot.nih.gov/zx5enqj      Tobacco use: www.quitwithusla.org

## 2022-06-02 NOTE — Clinical Note
Medicare awv complete. Health maintenance:  dexa scan due-ordered and message sent to staff to call patient to schedule. Diabetes urine screening ordered previously-message sent to staff to call patient to schedule. HgbA1C due-patient is due for follow up with pcp and will call to schedule an appointment soon. Shingles vaccine due-encouraged patient to obtain. Patient states she will get the covid 19 booster soon.   Hgba1c 8.6  Abnormal cognitive screening today. F/u with pcp.

## 2022-06-03 ENCOUNTER — TELEPHONE (OUTPATIENT)
Dept: ADMINISTRATIVE | Facility: CLINIC | Age: 82
End: 2022-06-03
Payer: MEDICARE

## 2022-06-03 NOTE — TELEPHONE ENCOUNTER
----- Message from JORDY Adan sent at 6/2/2022  9:30 AM CDT -----  Please call patient to schedule her Dexa scan and diabetes urine screening.   Thank you,   Marita

## 2022-06-24 ENCOUNTER — OFFICE VISIT (OUTPATIENT)
Dept: PODIATRY | Facility: CLINIC | Age: 82
End: 2022-06-24
Payer: MEDICARE

## 2022-06-24 VITALS
SYSTOLIC BLOOD PRESSURE: 138 MMHG | HEIGHT: 69 IN | WEIGHT: 159 LBS | DIASTOLIC BLOOD PRESSURE: 74 MMHG | HEART RATE: 84 BPM | BODY MASS INDEX: 23.55 KG/M2

## 2022-06-24 DIAGNOSIS — L84 CORN OR CALLUS: ICD-10-CM

## 2022-06-24 DIAGNOSIS — B35.1 DERMATOPHYTOSIS OF NAIL: ICD-10-CM

## 2022-06-24 DIAGNOSIS — L85.3 XEROSIS OF SKIN: ICD-10-CM

## 2022-06-24 DIAGNOSIS — E11.42 PERIPHERAL SENSORY NEUROPATHY DUE TO TYPE 2 DIABETES MELLITUS: Primary | ICD-10-CM

## 2022-06-24 PROCEDURE — 11056 PARNG/CUTG B9 HYPRKR LES 2-4: CPT | Mod: Q9,PBBFAC | Performed by: PODIATRIST

## 2022-06-24 PROCEDURE — 99204 PR OFFICE/OUTPT VISIT, NEW, LEVL IV, 45-59 MIN: ICD-10-PCS | Mod: 25,S$PBB,, | Performed by: PODIATRIST

## 2022-06-24 PROCEDURE — 99204 OFFICE O/P NEW MOD 45 MIN: CPT | Mod: 25,S$PBB,, | Performed by: PODIATRIST

## 2022-06-24 PROCEDURE — 11720 DEBRIDE NAIL 1-5: CPT | Mod: Q9,PBBFAC | Performed by: PODIATRIST

## 2022-06-24 PROCEDURE — 99999 PR PBB SHADOW E&M-EST. PATIENT-LVL IV: ICD-10-PCS | Mod: PBBFAC,,, | Performed by: PODIATRIST

## 2022-06-24 PROCEDURE — 11720 PR DEBRIDEMENT OF NAIL(S), 1-5: ICD-10-PCS | Mod: 59,Q9,S$PBB, | Performed by: PODIATRIST

## 2022-06-24 PROCEDURE — 11720 DEBRIDE NAIL 1-5: CPT | Mod: 59,Q9,S$PBB, | Performed by: PODIATRIST

## 2022-06-24 PROCEDURE — 11056 PR TRIM BENIGN HYPERKERATOTIC SKIN LESION,2-4: ICD-10-PCS | Mod: Q9,S$PBB,, | Performed by: PODIATRIST

## 2022-06-24 PROCEDURE — 99999 PR PBB SHADOW E&M-EST. PATIENT-LVL IV: CPT | Mod: PBBFAC,,, | Performed by: PODIATRIST

## 2022-06-24 PROCEDURE — 99214 OFFICE O/P EST MOD 30 MIN: CPT | Mod: PBBFAC | Performed by: PODIATRIST

## 2022-06-24 PROCEDURE — 11056 PARNG/CUTG B9 HYPRKR LES 2-4: CPT | Mod: Q9,S$PBB,, | Performed by: PODIATRIST

## 2022-06-24 RX ORDER — AMMONIUM LACTATE 12 G/100G
LOTION TOPICAL
Qty: 396 G | Refills: 0 | Status: SHIPPED | OUTPATIENT
Start: 2022-06-24 | End: 2022-10-27

## 2022-06-24 NOTE — PROGRESS NOTES
Subjective:     Patient ID: Darshana Fortune is a 81 y.o. female.    Chief Complaint: Toe Pain (C/o right Hallux and left Hallux and second toe pain, rates pain 6/10, x over 1 month, diabetic wears causal shoes ans socks, Last seen Duane Moss, [facility] 12/01/2021)    Darshana is a 81 y.o. female who presents to the clinic for evaluation and treatment of high risk feet. Darshana has a past medical history of Cerebral aneurysm without rupture (1998), Diabetes mellitus, Hypertension, and Malignant neoplasm of left kidney (2017). The patient's chief complaint is big toe and 2nd toe pain. Patient rates pain 6/10. Patient states she has started applying the topical nail medication as prescribe by FNP. Patient also states she is taking the Lyrica as prescribed by the FNP. This patient has documented high risk feet requiring routine maintenance secondary to diabetes mellitis and those secondary complications of diabetes, as mentioned..    PCP: MD Eri Ross, FNP (care at home evaluation)  Date Last Seen by PCP: 12/01/2021 (06/02/2022)    Current shoe gear:  Affected Foot: Casual shoes     Unaffected Foot: Casual shoes    Hemoglobin A1C   Date Value Ref Range Status   12/01/2021 8.6 (H) 4.0 - 5.6 % Final     Comment:     ADA Screening Guidelines:  5.7-6.4%  Consistent with prediabetes  >or=6.5%  Consistent with diabetes    High levels of fetal hemoglobin interfere with the HbA1C  assay. Heterozygous hemoglobin variants (HbS, HgC, etc)do  not significantly interfere with this assay.   However, presence of multiple variants may affect accuracy.     06/16/2021 8.4 (H) 4.0 - 5.6 % Final     Comment:     ADA Screening Guidelines:  5.7-6.4%  Consistent with prediabetes  >or=6.5%  Consistent with diabetes    High levels of fetal hemoglobin interfere with the HbA1C  assay. Heterozygous hemoglobin variants (HbS, HgC, etc)do  not significantly interfere with this assay.   However, presence of multiple variants may  affect accuracy.         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   Current Medications    AMLODIPINE (NORVASC) 10 MG TABLET    TAKE 1 TABLET BY MOUTH EVERY DAY    ASCORBIC ACID, VITAMIN C, (VITAMIN C) 500 MG TABLET    Take 500 mg by mouth once daily.    CHOLECALCIFEROL, VITAMIN D3, (VITAMIN D3) 50 MCG (2,000 UNIT) CAP CAPSULE    Take 2,000 Units by mouth once daily.    CICLOPIROX (PENLAC) 8 % SOLN    Apply topically nightly. On the 7th day, remove with alcohol.    CLONIDINE 0.3 MG/24 HR TD PTWK (CATAPRES) 0.3 MG/24 HR    Place 1 patch onto the skin every 7 days.    GLIMEPIRIDE (AMARYL) 4 MG TABLET    TAKE 1 TABLET BY MOUTH BEFORE BREAKFAST.    MULTIVIT-MIN/IRON/FOLIC/LUTEIN (CENTRUM SILVER WOMEN ORAL)    Take 1 tablet by mouth Daily.    PREGABALIN (LYRICA) 75 MG CAPSULE    Take 1 capsule (75 mg total) by mouth 2 (two) times daily.    TRADJENTA 5 MG TAB TABLET    TAKE 1 TABLET BY MOUTH EVERY DAY    VALSARTAN-HYDROCHLOROTHIAZIDE (DIOVAN-HCT) 320-12.5 MG PER TABLET    TAKE 1 TABLET BY MOUTH EVERY DAY   Changed and/or Refilled Medications    Modified Medication Previous Medication    ATORVASTATIN (LIPITOR) 20 MG TABLET atorvastatin (LIPITOR) 20 MG tablet       TAKE 1 TABLET BY MOUTH EVERY DAY IN THE EVENING    Take 1 tablet (20 mg total) by mouth every evening.       Review of patient's allergies indicates:   Allergen Reactions    Aspirin        Past Surgical History:   Procedure Laterality Date    HYSTERECTOMY      KIDNEY SURGERY Left  "2017    due to kidney cancer       History reviewed. No pertinent family history.    Social History     Socioeconomic History    Marital status:    Tobacco Use    Smoking status: Never Smoker    Smokeless tobacco: Never Used   Substance and Sexual Activity    Alcohol use: No    Drug use: No     Social Determinants of Health     Financial Resource Strain: Low Risk     Difficulty of Paying Living Expenses: Not hard at all   Food Insecurity: No Food Insecurity    Worried About Running Out of Food in the Last Year: Never true    Ran Out of Food in the Last Year: Never true   Transportation Needs: No Transportation Needs    Lack of Transportation (Medical): No    Lack of Transportation (Non-Medical): No   Physical Activity: Insufficiently Active    Days of Exercise per Week: 1 day    Minutes of Exercise per Session: 60 min   Stress: No Stress Concern Present    Feeling of Stress : Only a little   Social Connections: Socially Isolated    Frequency of Communication with Friends and Family: More than three times a week    Frequency of Social Gatherings with Friends and Family: More than three times a week    Attends Advent Services: Never    Active Member of Clubs or Organizations: No    Attends Club or Organization Meetings: Never    Marital Status:    Housing Stability: Low Risk     Unable to Pay for Housing in the Last Year: No    Number of Places Lived in the Last Year: 1    Unstable Housing in the Last Year: No       Vitals:    06/24/22 0927   BP: 138/74   Pulse: 84   Weight: 72.1 kg (159 lb)   Height: 5' 9" (1.753 m)   PainSc:   6       Hemoglobin A1C   Date Value Ref Range Status   12/01/2021 8.6 (H) 4.0 - 5.6 % Final     Comment:     ADA Screening Guidelines:  5.7-6.4%  Consistent with prediabetes  >or=6.5%  Consistent with diabetes    High levels of fetal hemoglobin interfere with the HbA1C  assay. Heterozygous hemoglobin variants (HbS, HgC, etc)do  not significantly interfere " with this assay.   However, presence of multiple variants may affect accuracy.     06/16/2021 8.4 (H) 4.0 - 5.6 % Final     Comment:     ADA Screening Guidelines:  5.7-6.4%  Consistent with prediabetes  >or=6.5%  Consistent with diabetes    High levels of fetal hemoglobin interfere with the HbA1C  assay. Heterozygous hemoglobin variants (HbS, HgC, etc)do  not significantly interfere with this assay.   However, presence of multiple variants may affect accuracy.         Review of Systems   Constitutional: Negative for chills and fever.   Respiratory: Negative for shortness of breath.    Cardiovascular: Negative for chest pain, palpitations, orthopnea, claudication and leg swelling.   Gastrointestinal: Negative for diarrhea, nausea and vomiting.   Musculoskeletal: Negative for joint pain.   Skin: Negative for rash.   Neurological: Positive for tingling and sensory change.   Psychiatric/Behavioral: Negative.          Objective:      PHYSICAL EXAM: Apperance: Alert and orient in no distress,well developed, and with good attention to grooming and body habits  Patient presents ambulating in casual shoes.   LOWER EXTREMITY EXAM:  VASCULAR: Dorsalis pedis pulses 1/4 bilateral and Posterior Tibial pulses 1/4 bilateral. Capillary fill time <blanched bilateral. No edema observed bilateral. Varicosities absent bilateral. Skin temperature of the lower extremities is warm to cool, proximal to distal. Hair growth dim bilateral.  DERMATOLOGICAL: No skin rashes, subcutaneous nodules, lesions, or ulcers observed bilateral. Nails 1,2 bilateral elongated, thickened, and discolored with subungual debris. Nails 3,4,5 bilateral normal length. Webspaces 1,2,3,4 bilateral clean, dry and without evidence of break in skin integrity. Dry skin noted plantarly bilateral. Mild hyperkeratotic tissue noted to bilateral distal 2nd toes.   NEUROLOGICAL: Light touch, sharp-dull, proprioception all present and equal bilaterally.  Vibratory sensation  absent at bilateral hallux and diminished at bilateral navicular. Protective sensation absent sites as tested with a [place] 5.07 monofilament.   MUSCULOSKELETAL: Muscle strength is 5/5 for foot inverters, everters, plantarflexors, and dorsiflexors. Muscle tone is normal.        Assessment:       ICD-10-CM ICD-9-CM   1. 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 Xerosis of skin  L85.3 706.8   3. Dermatophytosis of nail  B35.1 110.1   4. 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  Dispense: 396 g; Refill: 0    Dermatophytosis of nail    Corn or callus      I counseled the patient on her conditions, regarding findings of my examination, my impressions, and usual treatment plan.   Greater than 50% of this visit spent on counseling and coordination of care.  Greater than 15 minutes of a 20 minute appointment spent on education about the diabetic foot, neuropathy, and prevention of limb loss.  Shoe inspection. Diabetic Foot Education. Patient reminded of the importance of good nutrition and blood sugar control to help prevent podiatric complications of diabetes. Patient instructed on proper foot hygeine. We discussed wearing proper shoe gear, daily foot inspections, never walking without protective shoe gear, never putting sharp instruments to feet.    With patient's permission, nails 1,2 bilateral were debrided/trimmed in length and thickness aggressively to their soft tissue attachment mechanically and with electric , removing all offending nail and debris. Patient relates relief following the procedure.  With patient's permission, bilateral callus trimmed in thickness with #15 blade in thickness without incident.   Prescription written for Ammonium Lactate 12% cream to be applied twice daily  to area for moisturizer.   The patient and I reviewed the types of shoes she should be wearing, my recommendation includes generally the best time of the day for a shoe fitting is the afternoon, shoes with a wide toe box, very good cushion, and tennis shoes with removable inner soles.The patient and I reviewed my recommendations for over-the-counter orthotic inserts.   Patient  will continue to monitor the areas daily, inspect feet, wear protective shoe gear when ambulatory, moisturizer to maintain skin integrity. Patient reminded of the importance of good nutrition and blood sugar control to help prevent podiatric complications of diabetes.  Patient is scheduled with her PCP for next available appointment for established care.   Patient to return 6 months or sooner if needed.                Tiny Jerry DPM  [facility]

## 2022-07-05 ENCOUNTER — APPOINTMENT (OUTPATIENT)
Dept: RADIOLOGY | Facility: HOSPITAL | Age: 82
End: 2022-07-05
Attending: NURSE PRACTITIONER
Payer: MEDICARE

## 2022-07-05 DIAGNOSIS — Z78.0 POSTMENOPAUSAL: ICD-10-CM

## 2022-07-05 PROCEDURE — 77080 DXA BONE DENSITY AXIAL: CPT | Mod: TC

## 2022-07-05 PROCEDURE — 77080 DEXA BONE DENSITY SPINE HIP: ICD-10-PCS | Mod: 26,,, | Performed by: RADIOLOGY

## 2022-07-05 PROCEDURE — 77080 DXA BONE DENSITY AXIAL: CPT | Mod: 26,,, | Performed by: RADIOLOGY

## 2022-07-06 ENCOUNTER — TELEPHONE (OUTPATIENT)
Dept: ADMINISTRATIVE | Facility: CLINIC | Age: 82
End: 2022-07-06
Payer: MEDICARE

## 2022-07-06 NOTE — PROGRESS NOTES
You have an abnormal DEXA scan which is showing osteopenia.  I recommend using weight bearing exercises to help reduce the risk of a fracture.  Also, please start Over the counter Oscal 500+d taking  one tab by mouth 3 x a day.      We will recheck the DEXA scan in 2 years.

## 2022-07-06 NOTE — TELEPHONE ENCOUNTER
----- Message from JORDY Adan sent at 7/5/2022  6:35 PM CDT -----  Please let the patient know her bone density test showed osteopenia which means slight decrease in bone density. Make sure to take vitamin d 1000IU/day, get at least 4 servings of calcium in her diet, do weight bearing exercise, and no smoking and no heavy etoh use.

## 2022-07-08 ENCOUNTER — PATIENT MESSAGE (OUTPATIENT)
Dept: PRIMARY CARE CLINIC | Facility: CLINIC | Age: 82
End: 2022-07-08
Payer: MEDICARE

## 2022-07-15 ENCOUNTER — LAB VISIT (OUTPATIENT)
Dept: LAB | Facility: HOSPITAL | Age: 82
End: 2022-07-15
Attending: NURSE PRACTITIONER
Payer: MEDICARE

## 2022-07-15 ENCOUNTER — OFFICE VISIT (OUTPATIENT)
Dept: PRIMARY CARE CLINIC | Facility: CLINIC | Age: 82
End: 2022-07-15
Payer: MEDICARE

## 2022-07-15 VITALS
WEIGHT: 156.38 LBS | RESPIRATION RATE: 20 BRPM | HEART RATE: 74 BPM | HEIGHT: 67 IN | DIASTOLIC BLOOD PRESSURE: 76 MMHG | BODY MASS INDEX: 24.54 KG/M2 | TEMPERATURE: 98 F | OXYGEN SATURATION: 96 % | SYSTOLIC BLOOD PRESSURE: 140 MMHG

## 2022-07-15 DIAGNOSIS — E11.69 CONTROLLED TYPE 2 DIABETES MELLITUS WITH OTHER SPECIFIED COMPLICATION, WITHOUT LONG-TERM CURRENT USE OF INSULIN: ICD-10-CM

## 2022-07-15 DIAGNOSIS — I15.2 HYPERTENSION ASSOCIATED WITH TYPE 2 DIABETES MELLITUS: ICD-10-CM

## 2022-07-15 DIAGNOSIS — E78.2 MIXED HYPERLIPIDEMIA: ICD-10-CM

## 2022-07-15 DIAGNOSIS — I10 ESSENTIAL HYPERTENSION: ICD-10-CM

## 2022-07-15 DIAGNOSIS — B35.1 ONYCHOMYCOSIS: ICD-10-CM

## 2022-07-15 DIAGNOSIS — E11.59 HYPERTENSION ASSOCIATED WITH TYPE 2 DIABETES MELLITUS: ICD-10-CM

## 2022-07-15 LAB
ALBUMIN SERPL BCP-MCNC: 4.2 G/DL (ref 3.5–5.2)
ALP SERPL-CCNC: 86 U/L (ref 55–135)
ALT SERPL W/O P-5'-P-CCNC: 21 U/L (ref 10–44)
ANION GAP SERPL CALC-SCNC: 10 MMOL/L (ref 8–16)
AST SERPL-CCNC: 23 U/L (ref 10–40)
BASOPHILS # BLD AUTO: 0.02 K/UL (ref 0–0.2)
BASOPHILS NFR BLD: 0.3 % (ref 0–1.9)
BILIRUB SERPL-MCNC: 0.4 MG/DL (ref 0.1–1)
BUN SERPL-MCNC: 31 MG/DL (ref 8–23)
CALCIUM SERPL-MCNC: 10.8 MG/DL (ref 8.7–10.5)
CHLORIDE SERPL-SCNC: 107 MMOL/L (ref 95–110)
CHOLEST SERPL-MCNC: 202 MG/DL (ref 120–199)
CHOLEST/HDLC SERPL: 3.2 {RATIO} (ref 2–5)
CO2 SERPL-SCNC: 25 MMOL/L (ref 23–29)
CREAT SERPL-MCNC: 1.3 MG/DL (ref 0.5–1.4)
DIFFERENTIAL METHOD: ABNORMAL
EOSINOPHIL # BLD AUTO: 0.1 K/UL (ref 0–0.5)
EOSINOPHIL NFR BLD: 0.8 % (ref 0–8)
ERYTHROCYTE [DISTWIDTH] IN BLOOD BY AUTOMATED COUNT: 13.8 % (ref 11.5–14.5)
EST. GFR  (AFRICAN AMERICAN): 44.1 ML/MIN/1.73 M^2
EST. GFR  (NON AFRICAN AMERICAN): 38.3 ML/MIN/1.73 M^2
ESTIMATED AVG GLUCOSE: 206 MG/DL (ref 68–131)
GLUCOSE SERPL-MCNC: 119 MG/DL (ref 70–110)
HBA1C MFR BLD: 8.8 % (ref 4–5.6)
HCT VFR BLD AUTO: 36.2 % (ref 37–48.5)
HDLC SERPL-MCNC: 63 MG/DL (ref 40–75)
HDLC SERPL: 31.2 % (ref 20–50)
HGB BLD-MCNC: 11.9 G/DL (ref 12–16)
IMM GRANULOCYTES # BLD AUTO: 0.02 K/UL (ref 0–0.04)
IMM GRANULOCYTES NFR BLD AUTO: 0.3 % (ref 0–0.5)
LDLC SERPL CALC-MCNC: 126.8 MG/DL (ref 63–159)
LYMPHOCYTES # BLD AUTO: 2.1 K/UL (ref 1–4.8)
LYMPHOCYTES NFR BLD: 33.5 % (ref 18–48)
MCH RBC QN AUTO: 30 PG (ref 27–31)
MCHC RBC AUTO-ENTMCNC: 32.9 G/DL (ref 32–36)
MCV RBC AUTO: 91 FL (ref 82–98)
MONOCYTES # BLD AUTO: 0.5 K/UL (ref 0.3–1)
MONOCYTES NFR BLD: 8.1 % (ref 4–15)
NEUTROPHILS # BLD AUTO: 3.6 K/UL (ref 1.8–7.7)
NEUTROPHILS NFR BLD: 57 % (ref 38–73)
NONHDLC SERPL-MCNC: 139 MG/DL
NRBC BLD-RTO: 0 /100 WBC
PLATELET # BLD AUTO: 276 K/UL (ref 150–450)
PMV BLD AUTO: 10.4 FL (ref 9.2–12.9)
POTASSIUM SERPL-SCNC: 4.5 MMOL/L (ref 3.5–5.1)
PROT SERPL-MCNC: 8.9 G/DL (ref 6–8.4)
RBC # BLD AUTO: 3.97 M/UL (ref 4–5.4)
SODIUM SERPL-SCNC: 142 MMOL/L (ref 136–145)
T4 FREE SERPL-MCNC: 0.86 NG/DL (ref 0.71–1.51)
TRIGL SERPL-MCNC: 61 MG/DL (ref 30–150)
TSH SERPL DL<=0.005 MIU/L-ACNC: 1.91 UIU/ML (ref 0.4–4)
WBC # BLD AUTO: 6.27 K/UL (ref 3.9–12.7)

## 2022-07-15 PROCEDURE — 99999 PR PBB SHADOW E&M-EST. PATIENT-LVL III: ICD-10-PCS | Mod: PBBFAC,,, | Performed by: NURSE PRACTITIONER

## 2022-07-15 PROCEDURE — 99214 OFFICE O/P EST MOD 30 MIN: CPT | Mod: S$PBB,,, | Performed by: NURSE PRACTITIONER

## 2022-07-15 PROCEDURE — 99214 PR OFFICE/OUTPT VISIT, EST, LEVL IV, 30-39 MIN: ICD-10-PCS | Mod: S$PBB,,, | Performed by: NURSE PRACTITIONER

## 2022-07-15 PROCEDURE — 99999 PR PBB SHADOW E&M-EST. PATIENT-LVL III: CPT | Mod: PBBFAC,,, | Performed by: NURSE PRACTITIONER

## 2022-07-15 PROCEDURE — 36415 COLL VENOUS BLD VENIPUNCTURE: CPT | Mod: PN | Performed by: NURSE PRACTITIONER

## 2022-07-15 PROCEDURE — 84443 ASSAY THYROID STIM HORMONE: CPT | Performed by: NURSE PRACTITIONER

## 2022-07-15 PROCEDURE — 80061 LIPID PANEL: CPT | Performed by: NURSE PRACTITIONER

## 2022-07-15 PROCEDURE — 80053 COMPREHEN METABOLIC PANEL: CPT | Performed by: NURSE PRACTITIONER

## 2022-07-15 PROCEDURE — 99213 OFFICE O/P EST LOW 20 MIN: CPT | Mod: PBBFAC,PN | Performed by: NURSE PRACTITIONER

## 2022-07-15 PROCEDURE — 83036 HEMOGLOBIN GLYCOSYLATED A1C: CPT | Performed by: NURSE PRACTITIONER

## 2022-07-15 PROCEDURE — 85025 COMPLETE CBC W/AUTO DIFF WBC: CPT | Performed by: NURSE PRACTITIONER

## 2022-07-15 PROCEDURE — 84439 ASSAY OF FREE THYROXINE: CPT | Performed by: NURSE PRACTITIONER

## 2022-07-15 RX ORDER — ACETAMINOPHEN 500 MG
2000 TABLET ORAL DAILY
Qty: 30 CAPSULE | Refills: 3 | Status: SHIPPED | OUTPATIENT
Start: 2022-07-15 | End: 2022-08-14

## 2022-07-15 RX ORDER — MULTIVIT,IRON,MINERALS/LUTEIN
1 TABLET ORAL DAILY
Qty: 30 TABLET | Refills: 3 | Status: SHIPPED | OUTPATIENT
Start: 2022-07-15 | End: 2022-08-14

## 2022-07-15 RX ORDER — AMLODIPINE BESYLATE 10 MG/1
10 TABLET ORAL DAILY
Qty: 30 TABLET | Refills: 3 | Status: SHIPPED | OUTPATIENT
Start: 2022-07-15 | End: 2022-10-21 | Stop reason: SDUPTHER

## 2022-07-15 RX ORDER — CLONIDINE 0.3 MG/24H
1 PATCH, EXTENDED RELEASE TRANSDERMAL
Qty: 12 PATCH | Refills: 3 | Status: SHIPPED | OUTPATIENT
Start: 2022-07-15 | End: 2023-02-27 | Stop reason: SDUPTHER

## 2022-07-15 RX ORDER — ASCORBIC ACID 500 MG
500 TABLET ORAL DAILY
Qty: 30 TABLET | Refills: 3 | Status: SHIPPED | OUTPATIENT
Start: 2022-07-15 | End: 2022-08-14

## 2022-07-15 RX ORDER — LINAGLIPTIN 5 MG/1
5 TABLET, FILM COATED ORAL DAILY
Qty: 90 TABLET | Refills: 1 | Status: SHIPPED | OUTPATIENT
Start: 2022-07-15 | End: 2023-02-27 | Stop reason: SDUPTHER

## 2022-07-15 RX ORDER — GLIMEPIRIDE 4 MG/1
4 TABLET ORAL
Qty: 30 TABLET | Refills: 3 | Status: SHIPPED | OUTPATIENT
Start: 2022-07-15 | End: 2022-10-21 | Stop reason: SDUPTHER

## 2022-07-15 RX ORDER — ATORVASTATIN CALCIUM 20 MG/1
20 TABLET, FILM COATED ORAL NIGHTLY
Qty: 30 TABLET | Refills: 3 | Status: SHIPPED | OUTPATIENT
Start: 2022-07-15 | End: 2022-10-21 | Stop reason: SDUPTHER

## 2022-07-15 RX ORDER — VALSARTAN AND HYDROCHLOROTHIAZIDE 320; 12.5 MG/1; MG/1
1 TABLET, FILM COATED ORAL DAILY
Qty: 90 TABLET | Refills: 1 | Status: SHIPPED | OUTPATIENT
Start: 2022-07-15 | End: 2022-10-21 | Stop reason: SDUPTHER

## 2022-07-15 RX ORDER — CICLOPIROX 80 MG/ML
SOLUTION TOPICAL NIGHTLY
Qty: 6.6 ML | Refills: 12 | Status: SHIPPED | OUTPATIENT
Start: 2022-07-15 | End: 2023-02-27 | Stop reason: ALTCHOICE

## 2022-07-18 NOTE — PROGRESS NOTES
Subjective:       Patient ID: Darshana Fortune is a 81 y.o. female.    Chief Complaint: Establish Care      History of Present Illness:   Darshana Fortune 81 y.o. female presents today to establish care for management of HTN, HLD, toenail fungal infection, DM II and to address care gaps. Denies any other problems or concerns at this time.     Past Medical History:   Diagnosis Date    Cerebral aneurysm without rupture 1998    went to [facility] ([facility] in [place]    Diabetes mellitus     Hypertension     Malignant neoplasm of left kidney 2017    removed     History reviewed. No pertinent family history.  Social History     Socioeconomic History    Marital status:    Tobacco Use    Smoking status: Never Smoker    Smokeless tobacco: Never Used   Substance and Sexual Activity    Alcohol use: No    Drug use: No     Social Determinants of Health     Financial Resource Strain: Low Risk     Difficulty of Paying Living Expenses: Not hard at all   Food Insecurity: No Food Insecurity    Worried About Running Out of Food in the Last Year: Never true    Ran Out of Food in the Last Year: Never true   Transportation Needs: No Transportation Needs    Lack of Transportation (Medical): No    Lack of Transportation (Non-Medical): No   Physical Activity: Insufficiently Active    Days of Exercise per Week: 1 day    Minutes of Exercise per Session: 60 min   Stress: No Stress Concern Present    Feeling of Stress : Only a little   Social Connections: Socially Isolated    Frequency of Communication with Friends and Family: More than three times a week    Frequency of Social Gatherings with Friends and Family: More than three times a week    Attends Sabianist Services: Never    Active Member of Clubs or Organizations: No    Attends Club or Organization Meetings: Never    Marital Status:    Housing Stability: Low Risk     Unable to Pay for Housing in the Last Year: No    Number of Places Lived in the  Last Year: 1    Unstable Housing in the Last Year: No     Outpatient Encounter Medications as of 7/15/2022   Medication Sig Dispense Refill    ammonium lactate (LAC-HYDRIN) 12 % lotion Apply topically as needed for Dry Skin. 396 g 0    [DISCONTINUED] amLODIPine (NORVASC) 10 MG tablet TAKE 1 TABLET BY MOUTH EVERY DAY 90 tablet 1    [DISCONTINUED] ascorbic acid, vitamin C, (VITAMIN C) 500 MG tablet Take 500 mg by mouth once daily.      [DISCONTINUED] atorvastatin (LIPITOR) 20 MG tablet TAKE 1 TABLET BY MOUTH EVERY DAY IN THE EVENING 14 tablet 0    [DISCONTINUED] cholecalciferol, vitamin D3, (VITAMIN D3) 50 mcg (2,000 unit) Cap capsule Take 2,000 Units by mouth once daily.      [DISCONTINUED] ciclopirox (PENLAC) 8 % Soln Apply topically nightly. On the 7th day, remove with alcohol. 6.6 mL 12    [DISCONTINUED] glimepiride (AMARYL) 4 MG tablet TAKE 1 TABLET BY MOUTH BEFORE BREAKFAST. 90 tablet 1    [DISCONTINUED] multivit-min/iron/folic/lutein (CENTRUM SILVER WOMEN ORAL) Take 1 tablet by mouth Daily.      [DISCONTINUED] TRADJENTA 5 mg Tab tablet TAKE 1 TABLET BY MOUTH EVERY DAY 90 tablet 1    [DISCONTINUED] valsartan-hydrochlorothiazide (DIOVAN-HCT) 320-12.5 mg per tablet TAKE 1 TABLET BY MOUTH EVERY DAY 90 tablet 1    amLODIPine (NORVASC) 10 MG tablet Take 1 tablet (10 mg total) by mouth once daily. 30 tablet 3    ascorbic acid, vitamin C, (VITAMIN C) 500 MG tablet Take 1 tablet (500 mg total) by mouth once daily. 30 tablet 3    atorvastatin (LIPITOR) 20 MG tablet Take 1 tablet (20 mg total) by mouth every evening. 30 tablet 3    cholecalciferol, vitamin D3, (VITAMIN D3) 50 mcg (2,000 unit) Cap capsule Take 1 capsule (2,000 Units total) by mouth once daily. 30 capsule 3    ciclopirox (PENLAC) 8 % Soln Apply topically nightly. On the 7th day, remove with alcohol. 6.6 mL 12    cloNIDine 0.3 mg/24 hr td ptwk (CATAPRES) 0.3 mg/24 hr Place 1 patch onto the skin every 7 days. 12 patch 3    glimepiride  "(AMARYL) 4 MG tablet Take 1 tablet (4 mg total) by mouth before breakfast. 30 tablet 3    linaGLIPtin (TRADJENTA) 5 mg Tab tablet Take 1 tablet (5 mg total) by mouth once daily. 90 tablet 1    multivit-min-iron-FA-lutein (CENTRUM SILVER WOMEN) 8 mg iron-400 mcg-300 mcg Tab Take 1 tablet by mouth Daily. 30 tablet 3    pregabalin (LYRICA) 75 MG capsule Take 1 capsule (75 mg total) by mouth 2 (two) times daily. 180 capsule 1    valsartan-hydrochlorothiazide (DIOVAN-HCT) 320-12.5 mg per tablet Take 1 tablet by mouth once daily. 90 tablet 1    [DISCONTINUED] cloNIDine 0.3 mg/24 hr td ptwk (CATAPRES) 0.3 mg/24 hr Place 1 patch onto the skin every 7 days. 12 patch 3     No facility-administered encounter medications on file as of 7/15/2022.       Review of Systems   Constitutional: Negative for appetite change, chills and fever.   HENT: Negative for ear pain, sinus pressure, sore throat and trouble swallowing.    Eyes: Negative for visual disturbance.   Respiratory: Negative for shortness of breath.    Cardiovascular: Negative for chest pain.   Gastrointestinal: Negative for abdominal pain, diarrhea, nausea and vomiting.   Endocrine: Negative for cold intolerance, polyphagia and polyuria.   Genitourinary: Negative for decreased urine volume and dysuria.   Musculoskeletal: Negative for back pain.   Skin: Negative for rash.        Toenail fungal infection   Allergic/Immunologic: Negative for environmental allergies and food allergies.   Neurological: Negative for dizziness, tremors, weakness and numbness.   Hematological: Does not bruise/bleed easily.   Psychiatric/Behavioral: Negative for confusion and hallucinations. The patient is not nervous/anxious and is not hyperactive.    All other systems reviewed and are negative.      Objective:      BP (!) 140/76 (BP Location: Left arm, Patient Position: Sitting, BP Method: Medium (Manual))   Pulse 74   Temp 97.5 °F (36.4 °C) (Temporal)   Resp 20   Ht 5' 7" (1.702 m)   " Wt 70.9 kg (156 lb 6.4 oz)   SpO2 96%   BMI 24.50 kg/m²   Physical Exam  Vitals and nursing note reviewed.   Constitutional:       Appearance: She is well-developed.   HENT:      Head: Normocephalic and atraumatic.      Right Ear: External ear normal.      Left Ear: External ear normal.      Nose: Nose normal.   Eyes:      Conjunctiva/sclera: Conjunctivae normal.      Pupils: Pupils are equal, round, and reactive to light.   Cardiovascular:      Rate and Rhythm: Normal rate and regular rhythm.      Heart sounds: Normal heart sounds. No murmur heard.  Pulmonary:      Effort: Pulmonary effort is normal.      Breath sounds: Normal breath sounds. No wheezing.   Abdominal:      General: Bowel sounds are normal.      Palpations: Abdomen is soft.      Tenderness: There is no abdominal tenderness.   Musculoskeletal:         General: Normal range of motion.      Cervical back: Normal range of motion and neck supple.   Skin:     General: Skin is warm and dry.      Findings: No rash.   Neurological:      Mental Status: She is alert and oriented to person, place, and time.      Deep Tendon Reflexes: Reflexes are normal and symmetric.   Psychiatric:         Behavior: Behavior normal.         Thought Content: Thought content normal.         Judgment: Judgment normal.         Results for orders placed or performed in visit on 12/01/21   CBC Auto Differential   Result Value Ref Range    WBC 6.79 3.90 - 12.70 K/uL    RBC 4.03 4.00 - 5.40 M/uL    Hemoglobin 11.7 (L) 12.0 - 16.0 g/dL    Hematocrit 37.3 37.0 - 48.5 %    MCV 93 82 - 98 fL    MCH 29.0 27.0 - 31.0 pg    MCHC 31.4 (L) 32.0 - 36.0 g/dL    RDW 12.8 11.5 - 14.5 %    Platelets 230 150 - 450 K/uL    MPV 10.4 9.2 - 12.9 fL    Immature Granulocytes 0.4 0.0 - 0.5 %    Gran # (ANC) 4.2 1.8 - 7.7 K/uL    Immature Grans (Abs) 0.03 0.00 - 0.04 K/uL    Lymph # 1.9 1.0 - 4.8 K/uL    Mono # 0.5 0.3 - 1.0 K/uL    Eos # 0.1 0.0 - 0.5 K/uL    Baso # 0.03 0.00 - 0.20 K/uL    nRBC 0 0 /100  WBC    Gran % 62.4 38.0 - 73.0 %    Lymph % 27.8 18.0 - 48.0 %    Mono % 7.8 4.0 - 15.0 %    Eosinophil % 1.2 0.0 - 8.0 %    Basophil % 0.4 0.0 - 1.9 %    Differential Method Automated    Comprehensive Metabolic Panel   Result Value Ref Range    Sodium 142 136 - 145 mmol/L    Potassium 4.4 3.5 - 5.1 mmol/L    Chloride 107 95 - 110 mmol/L    CO2 23 23 - 29 mmol/L    Glucose 181 (H) 70 - 110 mg/dL    BUN 28 (H) 8 - 23 mg/dL    Creatinine 1.1 0.5 - 1.4 mg/dL    Calcium 10.2 8.7 - 10.5 mg/dL    Total Protein 7.9 6.0 - 8.4 g/dL    Albumin 4.1 3.5 - 5.2 g/dL    Total Bilirubin 0.1 0.1 - 1.0 mg/dL    Alkaline Phosphatase 99 55 - 135 U/L    AST 24 10 - 40 U/L    ALT 25 10 - 44 U/L    Anion Gap 12 8 - 16 mmol/L    eGFR if African American 54.0 (A) >60 mL/min/1.73 m^2    eGFR if non  46.9 (A) >60 mL/min/1.73 m^2   Lipid Panel   Result Value Ref Range    Cholesterol 176 120 - 199 mg/dL    Triglycerides 51 30 - 150 mg/dL    HDL 69 40 - 75 mg/dL    LDL Cholesterol 96.8 63.0 - 159.0 mg/dL    HDL/Cholesterol Ratio 39.2 20.0 - 50.0 %    Total Cholesterol/HDL Ratio 2.6 2.0 - 5.0    Non-HDL Cholesterol 107 mg/dL   Hemoglobin A1C   Result Value Ref Range    Hemoglobin A1C 8.6 (H) 4.0 - 5.6 %    Estimated Avg Glucose 200 (H) 68 - 131 mg/dL   TSH   Result Value Ref Range    TSH 2.022 0.400 - 4.000 uIU/mL   T3, Free   Result Value Ref Range    T3, Free 2.6 2.3 - 4.2 pg/mL   T4, Free   Result Value Ref Range    Free T4 0.80 0.71 - 1.51 ng/dL     Assessment:       1. Essential hypertension    2. Hypertension associated with type 2 diabetes mellitus    3. Controlled type 2 diabetes mellitus with other specified complication, without long-term current use of insulin    4. Mixed hyperlipidemia    5. Onychomycosis        Plan:   Essential hypertension  -     amLODIPine (NORVASC) 10 MG tablet; Take 1 tablet (10 mg total) by mouth once daily.  Dispense: 30 tablet; Refill: 3  -     cloNIDine 0.3 mg/24 hr td ptwk (CATAPRES) 0.3  mg/24 hr; Place 1 patch onto the skin every 7 days.  Dispense: 12 patch; Refill: 3  -     CBC Auto Differential; Future; Expected date: 07/15/2022  -     Comprehensive Metabolic Panel; Future; Expected date: 07/15/2022    Hypertension associated with type 2 diabetes mellitus  -     valsartan-hydrochlorothiazide (DIOVAN-HCT) 320-12.5 mg per tablet; Take 1 tablet by mouth once daily.  Dispense: 90 tablet; Refill: 1  -     linaGLIPtin (TRADJENTA) 5 mg Tab tablet; Take 1 tablet (5 mg total) by mouth once daily.  Dispense: 90 tablet; Refill: 1    Controlled type 2 diabetes mellitus with other specified complication, without long-term current use of insulin  -     glimepiride (AMARYL) 4 MG tablet; Take 1 tablet (4 mg total) by mouth before breakfast.  Dispense: 30 tablet; Refill: 3  -     Microalbumin/creatinine urine ratio  -     Hemoglobin A1C; Future; Expected date: 07/15/2022  -     TSH; Future; Expected date: 07/15/2022  -     T4, Free; Future; Expected date: 07/15/2022    Mixed hyperlipidemia  -     atorvastatin (LIPITOR) 20 MG tablet; Take 1 tablet (20 mg total) by mouth every evening.  Dispense: 30 tablet; Refill: 3  -     Lipid Panel; Future; Expected date: 07/15/2022    Onychomycosis  -     ciclopirox (PENLAC) 8 % Soln; Apply topically nightly. On the 7th day, remove with alcohol.  Dispense: 6.6 mL; Refill: 12    Other orders  -     ascorbic acid, vitamin C, (VITAMIN C) 500 MG tablet; Take 1 tablet (500 mg total) by mouth once daily.  Dispense: 30 tablet; Refill: 3  -     cholecalciferol, vitamin D3, (VITAMIN D3) 50 mcg (2,000 unit) Cap capsule; Take 1 capsule (2,000 Units total) by mouth once daily.  Dispense: 30 capsule; Refill: 3  -     multivit-min-iron-FA-lutein (CENTRUM SILVER WOMEN) 8 mg iron-400 mcg-300 mcg Tab; Take 1 tablet by mouth Daily.  Dispense: 30 tablet; Refill: 3             [facility] [address]  [place] [address]  Office  [phone]  Fax [phone]

## 2022-10-21 ENCOUNTER — PATIENT MESSAGE (OUTPATIENT)
Dept: PRIMARY CARE CLINIC | Facility: CLINIC | Age: 82
End: 2022-10-21

## 2022-10-21 ENCOUNTER — PATIENT OUTREACH (OUTPATIENT)
Dept: ADMINISTRATIVE | Facility: HOSPITAL | Age: 82
End: 2022-10-21
Payer: MEDICARE

## 2022-10-21 ENCOUNTER — OFFICE VISIT (OUTPATIENT)
Dept: PRIMARY CARE CLINIC | Facility: CLINIC | Age: 82
End: 2022-10-21
Payer: MEDICARE

## 2022-10-21 VITALS
OXYGEN SATURATION: 98 % | HEIGHT: 67 IN | DIASTOLIC BLOOD PRESSURE: 68 MMHG | TEMPERATURE: 98 F | SYSTOLIC BLOOD PRESSURE: 138 MMHG | HEART RATE: 74 BPM | BODY MASS INDEX: 24.07 KG/M2 | WEIGHT: 153.38 LBS

## 2022-10-21 DIAGNOSIS — I15.2 HYPERTENSION ASSOCIATED WITH TYPE 2 DIABETES MELLITUS: ICD-10-CM

## 2022-10-21 DIAGNOSIS — Z91.81 AT HIGH RISK FOR FALLS: ICD-10-CM

## 2022-10-21 DIAGNOSIS — N30.00 ACUTE CYSTITIS WITHOUT HEMATURIA: ICD-10-CM

## 2022-10-21 DIAGNOSIS — D50.8 OTHER IRON DEFICIENCY ANEMIA: ICD-10-CM

## 2022-10-21 DIAGNOSIS — N18.30 CKD STAGE 3 DUE TO TYPE 2 DIABETES MELLITUS: ICD-10-CM

## 2022-10-21 DIAGNOSIS — E11.65 TYPE 2 DIABETES MELLITUS WITH HYPERGLYCEMIA, WITH LONG-TERM CURRENT USE OF INSULIN: ICD-10-CM

## 2022-10-21 DIAGNOSIS — E11.42 PERIPHERAL SENSORY NEUROPATHY DUE TO TYPE 2 DIABETES MELLITUS: ICD-10-CM

## 2022-10-21 DIAGNOSIS — E11.59 HYPERTENSION ASSOCIATED WITH TYPE 2 DIABETES MELLITUS: ICD-10-CM

## 2022-10-21 DIAGNOSIS — E78.2 MIXED HYPERLIPIDEMIA: ICD-10-CM

## 2022-10-21 DIAGNOSIS — I10 ESSENTIAL HYPERTENSION: Primary | ICD-10-CM

## 2022-10-21 DIAGNOSIS — E11.22 CKD STAGE 3 DUE TO TYPE 2 DIABETES MELLITUS: ICD-10-CM

## 2022-10-21 DIAGNOSIS — E11.69 CONTROLLED TYPE 2 DIABETES MELLITUS WITH OTHER SPECIFIED COMPLICATION, WITHOUT LONG-TERM CURRENT USE OF INSULIN: ICD-10-CM

## 2022-10-21 DIAGNOSIS — N18.31 STAGE 3A CHRONIC KIDNEY DISEASE: ICD-10-CM

## 2022-10-21 DIAGNOSIS — Z23 FLU VACCINE NEED: ICD-10-CM

## 2022-10-21 DIAGNOSIS — Z79.4 TYPE 2 DIABETES MELLITUS WITH HYPERGLYCEMIA, WITH LONG-TERM CURRENT USE OF INSULIN: ICD-10-CM

## 2022-10-21 DIAGNOSIS — C64.2 MALIGNANT NEOPLASM OF LEFT KIDNEY, EXCEPT RENAL PELVIS: ICD-10-CM

## 2022-10-21 PROCEDURE — 99214 OFFICE O/P EST MOD 30 MIN: CPT | Mod: S$PBB,,, | Performed by: FAMILY MEDICINE

## 2022-10-21 PROCEDURE — G0008 ADMIN INFLUENZA VIRUS VAC: HCPCS | Mod: PBBFAC,PN

## 2022-10-21 PROCEDURE — 99999 PR PBB SHADOW E&M-EST. PATIENT-LVL IV: CPT | Mod: PBBFAC,,, | Performed by: FAMILY MEDICINE

## 2022-10-21 PROCEDURE — 99499 UNLISTED E&M SERVICE: CPT | Mod: S$PBB,,, | Performed by: FAMILY MEDICINE

## 2022-10-21 PROCEDURE — 99999 PR PBB SHADOW E&M-EST. PATIENT-LVL IV: ICD-10-PCS | Mod: PBBFAC,,, | Performed by: FAMILY MEDICINE

## 2022-10-21 PROCEDURE — 99214 PR OFFICE/OUTPT VISIT, EST, LEVL IV, 30-39 MIN: ICD-10-PCS | Mod: S$PBB,,, | Performed by: FAMILY MEDICINE

## 2022-10-21 PROCEDURE — 99499 RISK ADDL DX/OHS AUDIT: ICD-10-PCS | Mod: S$PBB,,, | Performed by: FAMILY MEDICINE

## 2022-10-21 PROCEDURE — 99214 OFFICE O/P EST MOD 30 MIN: CPT | Mod: PBBFAC,PN,25 | Performed by: FAMILY MEDICINE

## 2022-10-21 RX ORDER — GLIMEPIRIDE 4 MG/1
4 TABLET ORAL
Qty: 90 TABLET | Refills: 1 | Status: SHIPPED | OUTPATIENT
Start: 2022-10-21 | End: 2023-02-27 | Stop reason: SDUPTHER

## 2022-10-21 RX ORDER — PREGABALIN 75 MG/1
75 CAPSULE ORAL DAILY
Qty: 90 CAPSULE | Refills: 1 | Status: SHIPPED | OUTPATIENT
Start: 2022-10-21 | End: 2022-10-21 | Stop reason: SDUPTHER

## 2022-10-21 RX ORDER — ATORVASTATIN CALCIUM 20 MG/1
20 TABLET, FILM COATED ORAL NIGHTLY
Qty: 90 TABLET | Refills: 1 | Status: SHIPPED | OUTPATIENT
Start: 2022-10-21 | End: 2023-02-27 | Stop reason: SDUPTHER

## 2022-10-21 RX ORDER — PREGABALIN 75 MG/1
75 CAPSULE ORAL DAILY
Qty: 90 CAPSULE | Refills: 1 | Status: SHIPPED | OUTPATIENT
Start: 2022-10-21 | End: 2023-02-27 | Stop reason: ALTCHOICE

## 2022-10-21 RX ORDER — SULFAMETHOXAZOLE AND TRIMETHOPRIM 800; 160 MG/1; MG/1
1 TABLET ORAL 2 TIMES DAILY
Qty: 6 TABLET | Refills: 0 | Status: SHIPPED | OUTPATIENT
Start: 2022-10-21 | End: 2022-10-24

## 2022-10-21 RX ORDER — AMLODIPINE BESYLATE 10 MG/1
10 TABLET ORAL DAILY
Qty: 90 TABLET | Refills: 1 | Status: SHIPPED | OUTPATIENT
Start: 2022-10-21 | End: 2023-02-27 | Stop reason: SDUPTHER

## 2022-10-21 RX ORDER — VALSARTAN AND HYDROCHLOROTHIAZIDE 320; 12.5 MG/1; MG/1
1 TABLET, FILM COATED ORAL DAILY
Qty: 90 TABLET | Refills: 1 | Status: SHIPPED | OUTPATIENT
Start: 2022-10-21 | End: 2023-02-27 | Stop reason: SDUPTHER

## 2022-10-21 NOTE — PROGRESS NOTES
Subjective:       Patient ID: Darshana Fortune is a 82 y.o. female.    Chief Complaint: Follow-up, Routine and UTI       Follow-up     History of Present Illness:   Darshana Fortune 82 y.o. female presents today with      Geriatric assessment:  Brought in by-grand daughter  Lives with- Ileana  Depression-No  Functional assessment-timed up and go-good but she has right knee problem  MMSE-NA  Sleep-good   Energy level-good, keeps 2 yr great grand son and exercises as much as she good  Eating-good  [employer]-NA  Home health-NA  Equiptment at home-NA  Advance care planning:did not ask  Problem today- dysuria  DM II: not controlled but A1c is due today, checks everyday-less than 150s.  HTN: is controlled  CKD 3, will check status.    Past Medical History:   Diagnosis Date    Cerebral aneurysm without rupture 1998    went to [facility] in [place]    Diabetes mellitus     Hypertension     Malignant neoplasm of left kidney 2017    removed     History reviewed. No pertinent family history.  Social History     Socioeconomic History    Marital status:    Tobacco Use    Smoking status: Never    Smokeless tobacco: Never   Substance and Sexual Activity    Alcohol use: No    Drug use: No     Social Determinants of Health     Financial Resource Strain: Low Risk     Difficulty of Paying Living Expenses: Not hard at all   Food Insecurity: No Food Insecurity    Worried About Running Out of Food in the Last Year: Never true    Ran Out of Food in the Last Year: Never true   Transportation Needs: No Transportation Needs    Lack of Transportation (Medical): No    Lack of Transportation (Non-Medical): No   Physical Activity: Insufficiently Active    Days of Exercise per Week: 1 day    Minutes of Exercise per Session: 60 min   Stress: No Stress Concern Present    Feeling of Stress : Only a little   Social Connections: Socially Isolated    Frequency of Communication with Friends and Family: More than three times a week     Frequency of Social Gatherings with Friends and Family: More than three times a week    Attends Druze Services: Never    Active Member of Clubs or Organizations: No    Attends Club or Organization Meetings: Never    Marital Status:    Housing Stability: Low Risk     Unable to Pay for Housing in the Last Year: No    Number of Places Lived in the Last Year: 1    Unstable Housing in the Last Year: No     Outpatient Encounter Medications as of 10/21/2022   Medication Sig Dispense Refill    amLODIPine (NORVASC) 10 MG tablet Take 1 tablet (10 mg total) by mouth once daily. 90 tablet 1    ammonium lactate (LAC-HYDRIN) 12 % lotion Apply topically as needed for Dry Skin. (Patient not taking: Reported on 10/21/2022) 396 g 0    atorvastatin (LIPITOR) 20 MG tablet Take 1 tablet (20 mg total) by mouth every evening. 90 tablet 1    ciclopirox (PENLAC) 8 % Soln Apply topically nightly. On the 7th day, remove with alcohol. (Patient not taking: Reported on 10/21/2022) 6.6 mL 12    cloNIDine 0.3 mg/24 hr td ptwk (CATAPRES) 0.3 mg/24 hr Place 1 patch onto the skin every 7 days. 12 patch 3    glimepiride (AMARYL) 4 MG tablet Take 1 tablet (4 mg total) by mouth before breakfast. 90 tablet 1    linaGLIPtin (TRADJENTA) 5 mg Tab tablet Take 1 tablet (5 mg total) by mouth once daily. 90 tablet 1    pregabalin (LYRICA) 75 MG capsule Take 1 capsule (75 mg total) by mouth once daily. 90 capsule 1    valsartan-hydrochlorothiazide (DIOVAN-HCT) 320-12.5 mg per tablet Take 1 tablet by mouth once daily. 90 tablet 1    [DISCONTINUED] amLODIPine (NORVASC) 10 MG tablet Take 1 tablet (10 mg total) by mouth once daily. 30 tablet 3    [DISCONTINUED] atorvastatin (LIPITOR) 20 MG tablet Take 1 tablet (20 mg total) by mouth every evening. 30 tablet 3    [DISCONTINUED] glimepiride (AMARYL) 4 MG tablet Take 1 tablet (4 mg total) by mouth before breakfast. 30 tablet 3    [DISCONTINUED] pregabalin (LYRICA) 75 MG capsule Take 1 capsule (75 mg total)  "by mouth 2 (two) times daily. 180 capsule 1    [DISCONTINUED] pregabalin (LYRICA) 75 MG capsule Take 1 capsule (75 mg total) by mouth once daily. 90 capsule 1    [DISCONTINUED] valsartan-hydrochlorothiazide (DIOVAN-HCT) 320-12.5 mg per tablet Take 1 tablet by mouth once daily. 90 tablet 1     No facility-administered encounter medications on file as of 10/21/2022.       Review of Systems    Review of Systems      A complete 10 point ROS was completed and are positive as per above HPI.    Otherwise negative for fever, diplopia, chest pain, shortness of breath, vomiting, blood in urine,  skin rash, seizures and unusual bleeding.       Objective:      /68 (BP Location: Left arm, Patient Position: Sitting, BP Method: Medium (Manual))   Pulse 74   Temp 98.1 °F (36.7 °C) (Temporal)   Ht 5' 7" (1.702 m)   Wt 69.6 kg (153 lb 6.4 oz)   SpO2 98%   BMI 24.03 kg/m²   Physical Exam  Cardiovascular:      Rate and Rhythm: Normal rate and regular rhythm.      Heart sounds: Murmur heard.   Pulmonary:      Effort: Pulmonary effort is normal. No respiratory distress.      Breath sounds: Normal breath sounds.       CONSTITUTIONAL: No apparent distress. Appears comfortable. Does not appear acutely ill or septic. Appears adequately hydrated.  CARDIOVASCULAR: No perioral cyanosis  PULMONARY: Breathing unlabored. No retractions Chest expansion grossly normal.  PSYCHIATRIC: Alert and conversant and grossly oriented. Mood is grossly neutral. Affect appropriate. Judgment and insight grossly intact.  NEUROLOGIC: No focal sensory deficits reported.     Results for orders placed or performed in visit on 07/15/22   CBC Auto Differential   Result Value Ref Range    WBC 6.27 3.90 - 12.70 K/uL    RBC 3.97 (L) 4.00 - 5.40 M/uL    Hemoglobin 11.9 (L) 12.0 - 16.0 g/dL    Hematocrit 36.2 (L) 37.0 - 48.5 %    MCV 91 82 - 98 fL    MCH 30.0 27.0 - 31.0 pg    MCHC 32.9 32.0 - 36.0 g/dL    RDW 13.8 11.5 - 14.5 %    Platelets 276 150 - 450 K/uL    " MPV 10.4 9.2 - 12.9 fL    Immature Granulocytes 0.3 0.0 - 0.5 %    Gran # (ANC) 3.6 1.8 - 7.7 K/uL    Immature Grans (Abs) 0.02 0.00 - 0.04 K/uL    Lymph # 2.1 1.0 - 4.8 K/uL    Mono # 0.5 0.3 - 1.0 K/uL    Eos # 0.1 0.0 - 0.5 K/uL    Baso # 0.02 0.00 - 0.20 K/uL    nRBC 0 0 /100 WBC    Gran % 57.0 38.0 - 73.0 %    Lymph % 33.5 18.0 - 48.0 %    Mono % 8.1 4.0 - 15.0 %    Eosinophil % 0.8 0.0 - 8.0 %    Basophil % 0.3 0.0 - 1.9 %    Differential Method Automated    Comprehensive Metabolic Panel   Result Value Ref Range    Sodium 142 136 - 145 mmol/L    Potassium 4.5 3.5 - 5.1 mmol/L    Chloride 107 95 - 110 mmol/L    CO2 25 23 - 29 mmol/L    Glucose 119 (H) 70 - 110 mg/dL    BUN 31 (H) 8 - 23 mg/dL    Creatinine 1.3 0.5 - 1.4 mg/dL    Calcium 10.8 (H) 8.7 - 10.5 mg/dL    Total Protein 8.9 (H) 6.0 - 8.4 g/dL    Albumin 4.2 3.5 - 5.2 g/dL    Total Bilirubin 0.4 0.1 - 1.0 mg/dL    Alkaline Phosphatase 86 55 - 135 U/L    AST 23 10 - 40 U/L    ALT 21 10 - 44 U/L    Anion Gap 10 8 - 16 mmol/L    eGFR if African American 44.1 (A) >60 mL/min/1.73 m^2    eGFR if non  38.3 (A) >60 mL/min/1.73 m^2   Lipid Panel   Result Value Ref Range    Cholesterol 202 (H) 120 - 199 mg/dL    Triglycerides 61 30 - 150 mg/dL    HDL 63 40 - 75 mg/dL    LDL Cholesterol 126.8 63.0 - 159.0 mg/dL    HDL/Cholesterol Ratio 31.2 20.0 - 50.0 %    Total Cholesterol/HDL Ratio 3.2 2.0 - 5.0    Non-HDL Cholesterol 139 mg/dL   Hemoglobin A1C   Result Value Ref Range    Hemoglobin A1C 8.8 (H) 4.0 - 5.6 %    Estimated Avg Glucose 206 (H) 68 - 131 mg/dL   TSH   Result Value Ref Range    TSH 1.910 0.400 - 4.000 uIU/mL   T4, Free   Result Value Ref Range    Free T4 0.86 0.71 - 1.51 ng/dL     Assessment:       1. Essential hypertension    2. Flu vaccine need    3. Acute cystitis without hematuria    4. CKD stage 3 due to type 2 diabetes mellitus    5. Type 2 diabetes mellitus with hyperglycemia, with long-term current use of insulin    6. Stage 3a  chronic kidney disease    7. 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 At high risk for falls    9. Controlled type 2 diabetes mellitus with other specified complication, without long-term current use of insulin    10. Hypertension associated with type 2 diabetes mellitus    11. Mixed hyperlipidemia    12. Other iron deficiency anemia    13. Malignant neoplasm of left kidney, except renal pelvis          Plan:   Essential hypertension  -     amLODIPine (NORVASC) 10 MG tablet; Take 1 tablet (10 mg total) by mouth once daily.  Dispense: 90 tablet; Refill: 1  -     Hypertension Digital Medicine (HDMP) Enrollment Order  -     Hypertension Digital Medicine (HDM): Assign Onboarding Questionnaires    Flu vaccine need  -     Cancel: Influenza - Quadrivalent *Preferred* (6 months+) (PF)  -     Influenza (FLUAD) - Quadrivalent (Adjuvanted) *Preferred* (65+) (PF)    Acute cystitis without hematuria  -     POCT URINALYSIS  -     Urine culture; Future; Expected date: 10/21/2022    CKD stage 3 due to type 2 diabetes mellitus  -     Basic Metabolic Panel; Future; Expected date: 01/21/2023    Type 2 diabetes mellitus with hyperglycemia, with long-term current use of insulin  -     Hemoglobin A1C; Future; Expected date: 10/21/2022  -     Microalbumin/Creatinine Ratio, Urine; Future; Expected date: 10/21/2022  -     Diabetes Digital Medicine (DDMP) Enrollment Order  -     Diabetes Digital Medicine (DDMP): Assign Onboarding Questionnaires    Stage 3a chronic kidney disease  -     Discontinue: pregabalin (LYRICA) 75 MG capsule; Take 1 capsule (75 mg total) by mouth once daily.  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  Dispense: 90 capsule; Refill: 1  -     pregabalin (LYRICA) 75 MG capsule; Take 1 capsule (75 mg total) by mouth once daily.  Dispense:  90 capsule; Refill: 1    At high risk for falls  -     Discontinue: pregabalin (LYRICA) 75 MG capsule; Take 1 capsule (75 mg total) by mouth once daily.  Dispense: 90 capsule; Refill: 1    Controlled type 2 diabetes mellitus with other specified complication, without long-term current use of insulin  -     glimepiride (AMARYL) 4 MG tablet; Take 1 tablet (4 mg total) by mouth before breakfast.  Dispense: 90 tablet; Refill: 1    Hypertension associated with type 2 diabetes mellitus  -     valsartan-hydrochlorothiazide (DIOVAN-HCT) 320-12.5 mg per tablet; Take 1 tablet by mouth once daily.  Dispense: 90 tablet; Refill: 1    Mixed hyperlipidemia  -     atorvastatin (LIPITOR) 20 MG tablet; Take 1 tablet (20 mg total) by mouth every evening.  Dispense: 90 tablet; Refill: 1  -     Lipids Digital Medicine (LDMP) Enrollment Order  -     Lipids Digital Medicine (LDMP): Assign Onboarding Questionnaires    Other iron deficiency anemia  -     FERRITIN; Future; Expected date: 10/21/2022  -     CBC Auto Differential; Future; Expected date: 10/21/2022  -     Iron and TIBC; Future; Expected date: 10/21/2022    Malignant neoplasm of left kidney, except renal pelvis  Comments:  history of     Treatment options and alternatives were discussed with the patient. Patient was given ample time to ask questions. All questions were answered. Voices understanding and acceptance of this advice. Will call back if any further questions or concerns.    Antionette Son MD

## 2022-10-21 NOTE — PROGRESS NOTES
Verified patient by using 2 patient identifiers.  Administered _____fluad______ 0.5mL to ___right_____ deltoid without difficulty.  Encouraged patient to remain in the lobby for 15 minutes post injection to monitor for adverse reactions, verbalized understanding.  Educated patient on signs of adverse reactions, and instructed patient to report any signs of adverse reactions to the  staff so that the clinical team is made aware, verbalized understanding

## 2022-10-25 DIAGNOSIS — N17.9 ACUTE KIDNEY INJURY: Primary | ICD-10-CM

## 2022-10-26 ENCOUNTER — TELEPHONE (OUTPATIENT)
Dept: PRIMARY CARE CLINIC | Facility: CLINIC | Age: 82
End: 2022-10-26
Payer: MEDICARE

## 2022-10-26 NOTE — TELEPHONE ENCOUNTER
Spoke with pt verbalized understanding of lab results and recommendations of Dr. FREDERICK Son upon review

## 2022-10-26 NOTE — TELEPHONE ENCOUNTER
----- Message from Antionette Son MD sent at 10/25/2022  2:18 PM CDT -----  I have reviewed all your lab results shows acute kidney injury, stay hydrated and avoid all NSAIDs, fluids as tolerated and repeat bmp in one week.   Iron saturation is low-eat iron rich food.  Diabetes: A1C is lower but still not controlled-continue your med as ordered and follow dietary restrictions  Blood count, kidney function, liver function, electrolytes, cholesterol and thyroid function are all normal.    Please do not hesitate to contact us if you have any questions.

## 2022-10-31 ENCOUNTER — PATIENT MESSAGE (OUTPATIENT)
Dept: PRIMARY CARE CLINIC | Facility: CLINIC | Age: 82
End: 2022-10-31
Payer: MEDICARE

## 2022-11-01 ENCOUNTER — LAB VISIT (OUTPATIENT)
Dept: LAB | Facility: HOSPITAL | Age: 82
End: 2022-11-01
Attending: FAMILY MEDICINE
Payer: MEDICARE

## 2022-11-01 DIAGNOSIS — N30.00 ACUTE CYSTITIS WITHOUT HEMATURIA: ICD-10-CM

## 2022-11-01 DIAGNOSIS — E11.65 TYPE 2 DIABETES MELLITUS WITH HYPERGLYCEMIA, WITH LONG-TERM CURRENT USE OF INSULIN: ICD-10-CM

## 2022-11-01 DIAGNOSIS — Z79.4 TYPE 2 DIABETES MELLITUS WITH HYPERGLYCEMIA, WITH LONG-TERM CURRENT USE OF INSULIN: ICD-10-CM

## 2022-11-01 LAB
ALBUMIN/CREAT UR: 11.2 UG/MG (ref 0–30)
CREAT UR-MCNC: 98 MG/DL (ref 15–325)
MICROALBUMIN UR DL<=1MG/L-MCNC: 11 UG/ML

## 2022-11-01 PROCEDURE — 87086 URINE CULTURE/COLONY COUNT: CPT | Performed by: FAMILY MEDICINE

## 2022-11-01 PROCEDURE — 82043 UR ALBUMIN QUANTITATIVE: CPT | Performed by: FAMILY MEDICINE

## 2022-11-01 PROCEDURE — 82570 ASSAY OF URINE CREATININE: CPT | Performed by: FAMILY MEDICINE

## 2022-11-03 LAB — BACTERIA UR CULT: NORMAL

## 2022-11-14 ENCOUNTER — TELEPHONE (OUTPATIENT)
Dept: PRIMARY CARE CLINIC | Facility: CLINIC | Age: 82
End: 2022-11-14
Payer: MEDICARE

## 2022-11-14 NOTE — TELEPHONE ENCOUNTER
----- Message from Antionette Son MD sent at 11/13/2022 12:20 PM CST -----  Urine culture did not grow any bacterial

## 2023-01-10 DIAGNOSIS — Z12.31 BREAST CANCER SCREENING BY MAMMOGRAM: Primary | ICD-10-CM

## 2023-02-13 LAB
LEFT EYE DM RETINOPATHY: POSITIVE
RIGHT EYE DM RETINOPATHY: POSITIVE

## 2023-02-26 DIAGNOSIS — I10 ESSENTIAL HYPERTENSION: ICD-10-CM

## 2023-02-26 DIAGNOSIS — E11.42 DM TYPE 2 WITH DIABETIC PERIPHERAL NEUROPATHY: Primary | ICD-10-CM

## 2023-02-26 DIAGNOSIS — E78.2 MIXED HYPERLIPIDEMIA: ICD-10-CM

## 2023-02-26 RX ORDER — FERROUS SULFATE 325(65) MG
325 TABLET ORAL
Qty: 90 TABLET | Refills: 0 | Status: CANCELLED | OUTPATIENT
Start: 2023-02-26 | End: 2023-05-27

## 2023-02-26 RX ORDER — AMLODIPINE BESYLATE 10 MG/1
10 TABLET ORAL DAILY
Qty: 90 TABLET | Refills: 1 | Status: CANCELLED | OUTPATIENT
Start: 2023-02-26 | End: 2023-05-27

## 2023-02-27 ENCOUNTER — PATIENT OUTREACH (OUTPATIENT)
Dept: ADMINISTRATIVE | Facility: OTHER | Age: 83
End: 2023-02-27
Payer: MEDICARE

## 2023-02-27 ENCOUNTER — OFFICE VISIT (OUTPATIENT)
Dept: PRIMARY CARE CLINIC | Facility: CLINIC | Age: 83
End: 2023-02-27
Payer: MEDICARE

## 2023-02-27 ENCOUNTER — LAB VISIT (OUTPATIENT)
Dept: LAB | Facility: HOSPITAL | Age: 83
End: 2023-02-27
Attending: FAMILY MEDICINE
Payer: MEDICARE

## 2023-02-27 VITALS — DIASTOLIC BLOOD PRESSURE: 74 MMHG | SYSTOLIC BLOOD PRESSURE: 140 MMHG

## 2023-02-27 DIAGNOSIS — I15.2 HYPERTENSION ASSOCIATED WITH TYPE 2 DIABETES MELLITUS: ICD-10-CM

## 2023-02-27 DIAGNOSIS — E11.59 HYPERTENSION ASSOCIATED WITH TYPE 2 DIABETES MELLITUS: ICD-10-CM

## 2023-02-27 DIAGNOSIS — Z23 IMMUNIZATION DUE: Primary | ICD-10-CM

## 2023-02-27 DIAGNOSIS — C64.2 MALIGNANT NEOPLASM OF LEFT KIDNEY, EXCEPT RENAL PELVIS: ICD-10-CM

## 2023-02-27 DIAGNOSIS — E11.69 CONTROLLED TYPE 2 DIABETES MELLITUS WITH OTHER SPECIFIED COMPLICATION, WITHOUT LONG-TERM CURRENT USE OF INSULIN: ICD-10-CM

## 2023-02-27 DIAGNOSIS — E78.2 MIXED HYPERLIPIDEMIA: ICD-10-CM

## 2023-02-27 DIAGNOSIS — I10 ESSENTIAL HYPERTENSION: ICD-10-CM

## 2023-02-27 DIAGNOSIS — N18.31 STAGE 3A CHRONIC KIDNEY DISEASE: ICD-10-CM

## 2023-02-27 LAB
ALBUMIN SERPL BCP-MCNC: 4.1 G/DL (ref 3.5–5.2)
ALP SERPL-CCNC: 71 U/L (ref 55–135)
ALT SERPL W/O P-5'-P-CCNC: 24 U/L (ref 10–44)
ANION GAP SERPL CALC-SCNC: 9 MMOL/L (ref 8–16)
AST SERPL-CCNC: 20 U/L (ref 10–40)
BILIRUB SERPL-MCNC: 0.3 MG/DL (ref 0.1–1)
BUN SERPL-MCNC: 30 MG/DL (ref 8–23)
CALCIUM SERPL-MCNC: 10.7 MG/DL (ref 8.7–10.5)
CHLORIDE SERPL-SCNC: 105 MMOL/L (ref 95–110)
CO2 SERPL-SCNC: 26 MMOL/L (ref 23–29)
CREAT SERPL-MCNC: 1.2 MG/DL (ref 0.5–1.4)
EST. GFR  (NO RACE VARIABLE): 44.9 ML/MIN/1.73 M^2
GLUCOSE SERPL-MCNC: 155 MG/DL (ref 70–110)
POTASSIUM SERPL-SCNC: 4.4 MMOL/L (ref 3.5–5.1)
PROT SERPL-MCNC: 8.6 G/DL (ref 6–8.4)
SODIUM SERPL-SCNC: 140 MMOL/L (ref 136–145)

## 2023-02-27 PROCEDURE — 99999 PR PBB SHADOW E&M-EST. PATIENT-LVL IV: ICD-10-PCS | Mod: PBBFAC,,, | Performed by: FAMILY MEDICINE

## 2023-02-27 PROCEDURE — 36415 COLL VENOUS BLD VENIPUNCTURE: CPT | Mod: PN | Performed by: FAMILY MEDICINE

## 2023-02-27 PROCEDURE — 90750 HZV VACC RECOMBINANT IM: CPT | Mod: PBBFAC,PN

## 2023-02-27 PROCEDURE — 80053 COMPREHEN METABOLIC PANEL: CPT | Performed by: FAMILY MEDICINE

## 2023-02-27 PROCEDURE — 99999 PR PBB SHADOW E&M-EST. PATIENT-LVL IV: CPT | Mod: PBBFAC,,, | Performed by: FAMILY MEDICINE

## 2023-02-27 PROCEDURE — 99214 OFFICE O/P EST MOD 30 MIN: CPT | Mod: PBBFAC,PN | Performed by: FAMILY MEDICINE

## 2023-02-27 PROCEDURE — 99214 OFFICE O/P EST MOD 30 MIN: CPT | Mod: S$PBB,AQ,, | Performed by: FAMILY MEDICINE

## 2023-02-27 PROCEDURE — 83036 HEMOGLOBIN GLYCOSYLATED A1C: CPT | Performed by: FAMILY MEDICINE

## 2023-02-27 PROCEDURE — 99214 PR OFFICE/OUTPT VISIT, EST, LEVL IV, 30-39 MIN: ICD-10-PCS | Mod: S$PBB,AQ,, | Performed by: FAMILY MEDICINE

## 2023-02-27 PROCEDURE — 90471 IMMUNIZATION ADMIN: CPT | Mod: PBBFAC,PN

## 2023-02-27 RX ORDER — GLIMEPIRIDE 4 MG/1
4 TABLET ORAL
Qty: 90 TABLET | Refills: 3 | Status: SHIPPED | OUTPATIENT
Start: 2023-02-27 | End: 2024-02-07 | Stop reason: SDUPTHER

## 2023-02-27 RX ORDER — AMLODIPINE BESYLATE 10 MG/1
10 TABLET ORAL DAILY
Qty: 90 TABLET | Refills: 3 | Status: SHIPPED | OUTPATIENT
Start: 2023-02-27 | End: 2024-02-07 | Stop reason: SDUPTHER

## 2023-02-27 RX ORDER — LINAGLIPTIN 5 MG/1
5 TABLET, FILM COATED ORAL DAILY
Qty: 90 TABLET | Refills: 3 | Status: SHIPPED | OUTPATIENT
Start: 2023-02-27 | End: 2024-02-07 | Stop reason: SDUPTHER

## 2023-02-27 RX ORDER — ATORVASTATIN CALCIUM 20 MG/1
20 TABLET, FILM COATED ORAL NIGHTLY
Qty: 90 TABLET | Refills: 3 | Status: SHIPPED | OUTPATIENT
Start: 2023-02-27 | End: 2024-02-07 | Stop reason: SDUPTHER

## 2023-02-27 RX ORDER — VALSARTAN AND HYDROCHLOROTHIAZIDE 320; 12.5 MG/1; MG/1
1 TABLET, FILM COATED ORAL DAILY
Qty: 90 TABLET | Refills: 3 | Status: SHIPPED | OUTPATIENT
Start: 2023-02-27 | End: 2023-03-13 | Stop reason: ALTCHOICE

## 2023-02-27 RX ORDER — CLONIDINE 0.3 MG/24H
1 PATCH, EXTENDED RELEASE TRANSDERMAL
Qty: 12 PATCH | Refills: 3 | Status: SHIPPED | OUTPATIENT
Start: 2023-02-27 | End: 2024-02-07 | Stop reason: SDUPTHER

## 2023-02-27 NOTE — PROGRESS NOTES
Subjective:       Patient ID: Darshana Fortune is a 82 y.o. female.    Chief Complaint: Follow-up hypertension, hyperlipidemia, diabetes.        History of Present Illness:   Darshana Fortune 82 y.o. female presents today with   Geriatric assessment:  Brought in by- grand daughter  Lives with-family, keeps her grandson who is 3  Depression-no  Functional assessment-timed up and go-good  MMSE-NA  Sleep-good  Energy level-good  Eating-good  Problem today- BP ot quite controlled, blames it o anxiety, monitors BP and it is normal.  DM II: due for A1C. Complaint with diet and med  She had eye exam [facility] last week  CKD 3: she see renal routinely. Stopped taking gabapentin-ot working and does not want any med that could affect the kidneys, neuropathy is tolerable.  Needs refill on all her medications  The ASCVD Risk score (Osito ATKINSON, et al., 2019) failed to calculate for the following reasons:    The 2019 ASCVD risk score is only valid for ages 40 to 79     Past Medical History:   Diagnosis Date    Cerebral aneurysm without rupture 1998    went to [facility] in [place]    Diabetes mellitus     Hypertension     Malignant neoplasm of left kidney 2017    removed     History reviewed. No pertinent family history.  Social History     Socioeconomic History    Marital status:    Tobacco Use    Smoking status: Never    Smokeless tobacco: Never   Substance and Sexual Activity    Alcohol use: No    Drug use: No     Social Determinants of Health     Financial Resource Strain: High Risk    Difficulty of Paying Living Expenses: Hard   Food Insecurity: No Food Insecurity    Worried About Running Out of Food in the Last Year: Never true    Ran Out of Food in the Last Year: Never true   Transportation Needs: No Transportation Needs    Lack of Transportation (Medical): No    Lack of Transportation (Non-Medical): No   Physical Activity: Sufficiently Active    Days of Exercise per Week: 4 days    Minutes of Exercise per  Session: 60 min   Stress: No Stress Concern Present    Feeling of Stress : Not at all   Social Connections: Socially Isolated    Frequency of Communication with Friends and Family: More than three times a week    Frequency of Social Gatherings with Friends and Family: More than three times a week    Attends [employer] Services: Never    Active Member of Clubs or Organizations: No    Attends Club or Organization Meetings: Never    Marital Status:    Housing Stability: Low Risk     Unable to Pay for Housing in the Last Year: No    Number of Places Lived in the Last Year: 1    Unstable Housing in the Last Year: No     Outpatient Encounter Medications as of 2/27/2023   Medication Sig Dispense Refill    ammonium lactate (LAC-HYDRIN) 12 % lotion APPLY TOPICALLY AS NEEDED FOR DRY SKIN. 400 mL 3    amLODIPine (NORVASC) 10 MG tablet Take 1 tablet (10 mg total) by mouth once daily. 90 tablet 3    atorvastatin (LIPITOR) 20 MG tablet Take 1 tablet (20 mg total) by mouth every evening. 90 tablet 3    cloNIDine 0.3 mg/24 hr td ptwk (CATAPRES) 0.3 mg/24 hr Place 1 patch onto the skin every 7 days. 12 patch 3    glimepiride (AMARYL) 4 MG tablet Take 1 tablet (4 mg total) by mouth before breakfast. 90 tablet 3    linaGLIPtin (TRADJENTA) 5 mg Tab tablet Take 1 tablet (5 mg total) by mouth once daily. 90 tablet 3    valsartan-hydrochlorothiazide (DIOVAN-HCT) 320-12.5 mg per tablet Take 1 tablet by mouth once daily. 90 tablet 3    [DISCONTINUED] amLODIPine (NORVASC) 10 MG tablet Take 1 tablet (10 mg total) by mouth once daily. 90 tablet 1    [DISCONTINUED] atorvastatin (LIPITOR) 20 MG tablet Take 1 tablet (20 mg total) by mouth every evening. 90 tablet 1    [DISCONTINUED] ciclopirox (PENLAC) 8 % Soln Apply topically nightly. On the 7th day, remove with alcohol. (Patient not taking: Reported on 10/21/2022) 6.6 mL 12    [DISCONTINUED] cloNIDine 0.3 mg/24 hr td ptwk (CATAPRES) 0.3 mg/24 hr Place 1 patch onto the skin every 7 days.  "12 patch 3    [DISCONTINUED] glimepiride (AMARYL) 4 MG tablet Take 1 tablet (4 mg total) by mouth before breakfast. 90 tablet 1    [DISCONTINUED] linaGLIPtin (TRADJENTA) 5 mg Tab tablet Take 1 tablet (5 mg total) by mouth once daily. 90 tablet 1    [DISCONTINUED] pregabalin (LYRICA) 75 MG capsule Take 1 capsule (75 mg total) by mouth once daily. 90 capsule 1    [DISCONTINUED] valsartan-hydrochlorothiazide (DIOVAN-HCT) 320-12.5 mg per tablet Take 1 tablet by mouth once daily. 90 tablet 1     No facility-administered encounter medications on file as of 2/27/2023.       Review of Systems    Review of Systems      A complete 10 point ROS was completed and are positive as per above HPI.    Otherwise negative for fever, diplopia, chest pain, shortness of breath, vomiting, blood in urine, joint pain, skin rash, seizures and unusual bleeding.     Objective:      BP (!) 140/74 (BP Location: Left arm, Patient Position: Sitting, BP Method: Medium (Manual))   Pulse (P) 70   Temp (P) 98.6 °F (37 °C) (Oral)   Ht (P) 5' 7" (1.702 m)   Wt (P) 66.2 kg (145 lb 15.1 oz)   SpO2 (P) 98%   BMI (P) 22.86 kg/m²   Physical Exam  Cardiovascular:      Pulses: Normal pulses.      Heart sounds: Normal heart sounds.   Pulmonary:      Effort: Pulmonary effort is normal.      Breath sounds: Normal breath sounds.       CONSTITUTIONAL: No apparent distress. Appears comfortable. Does not appear acutely ill or septic. Appears adequately hydrated.  CARDIOVASCULAR: No perioral cyanosis  PULMONARY: Breathing unlabored. No retractions Chest expansion grossly normal.  PSYCHIATRIC: Alert and conversant and grossly oriented. Mood is grossly neutral. Affect appropriate. Judgment and insight grossly intact.  NEUROLOGIC: No focal sensory deficits reported.   Results for orders placed or performed in visit on 11/01/22   Basic Metabolic Panel   Result Value Ref Range    Sodium 137 136 - 145 mmol/L    Potassium 5.1 3.5 - 5.1 mmol/L    Chloride 104 95 - 110 " mmol/L    CO2 22 (L) 23 - 29 mmol/L    Glucose 136 (H) 70 - 110 mg/dL    BUN 27 (H) 8 - 23 mg/dL    Creatinine 1.3 0.5 - 1.4 mg/dL    Calcium 10.2 8.7 - 10.5 mg/dL    Anion Gap 11 8 - 16 mmol/L    eGFR 40.8 (A) >60 mL/min/1.73 m^2     Assessment:       1. Immunization due    2. Controlled type 2 diabetes mellitus with other specified complication, without long-term current use of insulin    3. Essential hypertension    4. Hypertension associated with type 2 diabetes mellitus    5. Mixed hyperlipidemia    6. Stage 3a chronic kidney disease    7. Malignant neoplasm of left kidney, except renal pelvis        Plan:   Immunization due  -     (In Office Administered) Zoster Recombinant Vaccine    Controlled type 2 diabetes mellitus with other specified complication, without long-term current use of insulin  -     glimepiride (AMARYL) 4 MG tablet; Take 1 tablet (4 mg total) by mouth before breakfast.  Dispense: 90 tablet; Refill: 3  -     Hemoglobin A1C; Future; Expected date: 02/27/2023  -     Comprehensive Metabolic Panel; Future; Expected date: 02/27/2023  -     Diabetes Digital Medicine (DDMP) Enrollment Order    Essential hypertension  -     cloNIDine 0.3 mg/24 hr td ptwk (CATAPRES) 0.3 mg/24 hr; Place 1 patch onto the skin every 7 days.  Dispense: 12 patch; Refill: 3  -     amLODIPine (NORVASC) 10 MG tablet; Take 1 tablet (10 mg total) by mouth once daily.  Dispense: 90 tablet; Refill: 3  -     Hypertension Digital Medicine (HDMP) Enrollment Order    Hypertension associated with type 2 diabetes mellitus  -     valsartan-hydrochlorothiazide (DIOVAN-HCT) 320-12.5 mg per tablet; Take 1 tablet by mouth once daily.  Dispense: 90 tablet; Refill: 3  -     linaGLIPtin (TRADJENTA) 5 mg Tab tablet; Take 1 tablet (5 mg total) by mouth once daily.  Dispense: 90 tablet; Refill: 3    Mixed hyperlipidemia  -     atorvastatin (LIPITOR) 20 MG tablet; Take 1 tablet (20 mg total) by mouth every evening.  Dispense: 90 tablet; Refill:  3  -     Lipids Digital Medicine (LDMP) Enrollment Order    Stage 3a chronic kidney disease  Comments:  stable, continue to avoid NSAIDS    Malignant neoplasm of left kidney, except renal pelvis  Comments:  history of.    I have reviewed all of the patient's clinical history available in care everywhere and Epic and have utilized this in my evaluation and management recommendations today.     Treatment options and alternatives were discussed with the patient. Patient was given ample time to ask questions. All questions were answered. Voices understanding and acceptance of this advice. Will call back if any further questions or concerns.  Antionette Son MD

## 2023-02-27 NOTE — PROGRESS NOTES
CHW - Initial Contact    This Community Health Worker updated the Social Determinant of Health questionnaire with patient during clinic visit today.    Pt identified barriers of most importance are. Patient shared no barriers.   Referrals to community agencies completed with patient consent outside of [facility] include. No outside referrals at this time.  Referrals were put through [facility] - no  Support and Services: None  Other information discussed the patient needs help with. No other information was discussed.   Follow up required: Yes  Follow-up Outreach - Due: 3/6/2023

## 2023-02-28 LAB
ESTIMATED AVG GLUCOSE: 186 MG/DL (ref 68–131)
HBA1C MFR BLD: 8.1 % (ref 4–5.6)

## 2023-03-01 ENCOUNTER — TELEPHONE (OUTPATIENT)
Dept: PRIMARY CARE CLINIC | Facility: CLINIC | Age: 83
End: 2023-03-01
Payer: MEDICARE

## 2023-03-01 ENCOUNTER — PATIENT OUTREACH (OUTPATIENT)
Dept: ADMINISTRATIVE | Facility: HOSPITAL | Age: 83
End: 2023-03-01
Payer: MEDICARE

## 2023-03-01 NOTE — PROGRESS NOTES
Abnormal lab result.  Your A1c is slightly improved but still not controlled.  Please take your meds as ordered and adhere strictly to your diet in order to avoid complications of uncontrolled diabetes which includes blindness, heart attack, stroke, kidney failure requiring dialysis, gangrene, loss of limb and premature death.    Kidney function has not changed and you still have chronic kidney disease 3. Continue to avoid NSAIDs.    As always, we are here to help youi achieve your health goals but we cannot do it without your cooperation. Let us know how we can help you.

## 2023-03-07 ENCOUNTER — PATIENT OUTREACH (OUTPATIENT)
Dept: ADMINISTRATIVE | Facility: OTHER | Age: 83
End: 2023-03-07
Payer: MEDICARE

## 2023-03-07 NOTE — PROGRESS NOTES
CHW - Follow Up    This Community Health Worker completed a follow up visit with patient via telephone today.  Pt reported. She's doing well.  Community Health Worker provided patient with a follow up call to check on patient to make sure she's doing well.  Follow up required: No  No future outreach task assigned      CHW - Case Closure    This Community Health Worker spoke to patient via telephone today.   Pt reported. No new information but she's doing well.  Pt denied any additional needs at this time and agrees with episode closure at this time.  Provided patient with Community Health Worker's contact information and encouraged her to contact this Community Health Worker if additional needs arise.

## 2023-03-13 ENCOUNTER — CLINICAL SUPPORT (OUTPATIENT)
Dept: PRIMARY CARE CLINIC | Facility: CLINIC | Age: 83
End: 2023-03-13
Payer: MEDICARE

## 2023-03-13 PROCEDURE — 99999 PR PBB SHADOW E&M-EST. PATIENT-LVL I: ICD-10-PCS | Mod: PBBFAC,,,

## 2023-03-13 PROCEDURE — 99211 OFF/OP EST MAY X REQ PHY/QHP: CPT | Mod: PBBFAC,PN

## 2023-03-13 PROCEDURE — 99999 PR PBB SHADOW E&M-EST. PATIENT-LVL I: CPT | Mod: PBBFAC,,,

## 2023-03-13 RX ORDER — OLMESARTAN MEDOXOMIL 40 MG/1
40 TABLET ORAL DAILY
Qty: 30 TABLET | Refills: 5 | Status: SHIPPED | OUTPATIENT
Start: 2023-03-13 | End: 2023-09-05

## 2023-03-13 NOTE — PROGRESS NOTES
Patient presented for blood pressure check. Patient blood pressure was elevated reading at 157/90. Provider in clinic was informed, patient medication was change and per provider patient will have blood pressure recheck in one week following new medication.

## 2023-03-20 ENCOUNTER — CLINICAL SUPPORT (OUTPATIENT)
Dept: PRIMARY CARE CLINIC | Facility: CLINIC | Age: 83
End: 2023-03-20
Payer: MEDICARE

## 2023-03-20 PROCEDURE — 99999 PR PBB SHADOW E&M-EST. PATIENT-LVL I: CPT | Mod: PBBFAC,,,

## 2023-03-20 PROCEDURE — 99211 OFF/OP EST MAY X REQ PHY/QHP: CPT | Mod: PBBFAC,PN

## 2023-03-20 PROCEDURE — 99999 PR PBB SHADOW E&M-EST. PATIENT-LVL I: ICD-10-PCS | Mod: PBBFAC,,,

## 2023-03-20 NOTE — PROGRESS NOTES
Pt presented to clinic for blood pressure check, pt stated medication was taken and she was able to check blood pressure reading this morning which was 173/77. Pt blood pressure in clinic was elevated reading at 148/72. Pt will be seen on 03/27/2023 with provider for 1 month follow up.

## 2023-03-27 ENCOUNTER — OFFICE VISIT (OUTPATIENT)
Dept: PRIMARY CARE CLINIC | Facility: CLINIC | Age: 83
End: 2023-03-27
Payer: MEDICARE

## 2023-03-27 VITALS
HEART RATE: 65 BPM | SYSTOLIC BLOOD PRESSURE: 132 MMHG | OXYGEN SATURATION: 98 % | DIASTOLIC BLOOD PRESSURE: 64 MMHG | TEMPERATURE: 98 F | WEIGHT: 145 LBS | BODY MASS INDEX: 22.76 KG/M2 | HEIGHT: 67 IN

## 2023-03-27 DIAGNOSIS — I10 ESSENTIAL HYPERTENSION: Primary | ICD-10-CM

## 2023-03-27 DIAGNOSIS — E78.2 MIXED HYPERLIPIDEMIA: ICD-10-CM

## 2023-03-27 DIAGNOSIS — E11.65 TYPE 2 DIABETES MELLITUS WITH HYPERGLYCEMIA, WITHOUT LONG-TERM CURRENT USE OF INSULIN: ICD-10-CM

## 2023-03-27 PROCEDURE — 99214 PR OFFICE/OUTPT VISIT, EST, LEVL IV, 30-39 MIN: ICD-10-PCS | Mod: S$GLB,,, | Performed by: FAMILY MEDICINE

## 2023-03-27 PROCEDURE — 99214 OFFICE O/P EST MOD 30 MIN: CPT | Mod: S$GLB,,, | Performed by: FAMILY MEDICINE

## 2023-03-27 PROCEDURE — 99999 PR PBB SHADOW E&M-EST. PATIENT-LVL IV: CPT | Mod: PBBFAC,,, | Performed by: FAMILY MEDICINE

## 2023-03-27 PROCEDURE — 99999 PR PBB SHADOW E&M-EST. PATIENT-LVL IV: ICD-10-PCS | Mod: PBBFAC,,, | Performed by: FAMILY MEDICINE

## 2023-03-27 PROCEDURE — 99214 OFFICE O/P EST MOD 30 MIN: CPT | Mod: PBBFAC,PN | Performed by: FAMILY MEDICINE

## 2023-03-27 NOTE — PROGRESS NOTES
Subjective:       Patient ID: Darshana Fortune is a 82 y.o. female.    Chief Complaint: Follow-up (hypertension)        History of Present Illness:   Darshana Fortune 82 y.o. female presents today with       Brought in by-grad daughter  Depression- denies  Functional assessment-timed up and go- ok  MMSE-NA  Sleep-ok  Energy level-ok  Eating-ok  Home health-no  Problem today- here for follow up on BP, Per grand daughter numbers are staying mainly I the 140s but it is normal today.   DM II: not quite controlled.    The ASCVD Risk score (Osito ATKINSON, et al., 2019) failed to calculate for the following reasons:    The 2019 ASCVD risk score is only valid for ages 40 to 79    Past Medical History:   Diagnosis Date    Cerebral aneurysm without rupture 1998    went to [facility] in [place]    Diabetes mellitus     Hypertension     Malignant neoplasm of left kidney 2017    removed     History reviewed. No pertinent family history.  Social History     Socioeconomic History    Marital status:    Tobacco Use    Smoking status: Never    Smokeless tobacco: Never   Substance and Sexual Activity    Alcohol use: No    Drug use: No     Social Determinants of Health     Financial Resource Strain: High Risk    Difficulty of Paying Living Expenses: Hard   Food Insecurity: No Food Insecurity    Worried About Running Out of Food in the Last Year: Never true    Ran Out of Food in the Last Year: Never true   Transportation Needs: No Transportation Needs    Lack of Transportation (Medical): No    Lack of Transportation (Non-Medical): No   Physical Activity: Sufficiently Active    Days of Exercise per Week: 4 days    Minutes of Exercise per Session: 60 min   Stress: No Stress Concern Present    Feeling of Stress : Not at all   Social Connections: Socially Isolated    Frequency of Communication with Friends and Family: More than three times a week    Frequency of Social Gatherings with Friends and Family: More than three times a  "week    Attends [employer] Services: Never    Active Member of Clubs or Organizations: No    Attends Club or Organization Meetings: Never    Marital Status:    Housing Stability: Low Risk     Unable to Pay for Housing in the Last Year: No    Number of Places Lived in the Last Year: 1    Unstable Housing in the Last Year: No     Outpatient Encounter Medications as of 3/27/2023   Medication Sig Dispense Refill    amLODIPine (NORVASC) 10 MG tablet Take 1 tablet (10 mg total) by mouth once daily. 90 tablet 3    ammonium lactate (LAC-HYDRIN) 12 % lotion APPLY TOPICALLY AS NEEDED FOR DRY SKIN. 400 mL 3    atorvastatin (LIPITOR) 20 MG tablet Take 1 tablet (20 mg total) by mouth every evening. 90 tablet 3    cloNIDine 0.3 mg/24 hr td ptwk (CATAPRES) 0.3 mg/24 hr Place 1 patch onto the skin every 7 days. 12 patch 3    glimepiride (AMARYL) 4 MG tablet Take 1 tablet (4 mg total) by mouth before breakfast. 90 tablet 3    linaGLIPtin (TRADJENTA) 5 mg Tab tablet Take 1 tablet (5 mg total) by mouth once daily. 90 tablet 3    olmesartan (BENICAR) 40 MG tablet Take 1 tablet (40 mg total) by mouth once daily. 30 tablet 5    [DISCONTINUED] valsartan-hydrochlorothiazide (DIOVAN-HCT) 320-12.5 mg per tablet Take 1 tablet by mouth once daily. 90 tablet 3     No facility-administered encounter medications on file as of 3/27/2023.       Review of Systems         A complete 10 point ROS was completed and are positive as per above HPI.    Otherwise negative for fever, diplopia, chest pain, shortness of breath, vomiting, blood in urine, joint pain, skin rash, seizures and unusual bleeding.     Objective:      /64 (BP Location: Left arm, Patient Position: Sitting, BP Method: Medium (Manual))   Pulse 65   Temp 98 °F (36.7 °C) (Oral)   Ht 5' 7" (1.702 m)   Wt 65.8 kg (145 lb)   SpO2 98%   BMI 22.71 kg/m²   Physical Exam    CONSTITUTIONAL: No apparent distress. Appears comfortable. Does not appear acutely ill or septic. Appears " adequately hydrated.  CARDIOVASCULAR: No perioral cyanosis  PULMONARY: Breathing unlabored. No retractions Chest expansion grossly normal.  PSYCHIATRIC: Alert and conversant and grossly oriented. Mood is grossly neutral. Affect appropriate. Judgment and insight grossly intact.  NEUROLOGIC: No focal sensory deficits reported.   Results for orders placed or performed in visit on 03/01/23    DIABETES EYE EXAM   Result Value Ref Range    Left Eye DM Retinopathy Positive     Right Eye DM Retinopathy Positive      Assessment:       1. Essential hypertension    2. Type 2 diabetes mellitus with hyperglycemia, without long-term current use of insulin    3. Mixed hyperlipidemia          Plan:   Essential hypertension  Comments:  ch cont, due to history of brain aneurysm, needs close monitoring, allow to cont current med and bring a series of home readings for me to see the trend.  Orders:  -     Hypertension Digital Medicine (HDMP) Enrollment Order    Type 2 diabetes mellitus with hyperglycemia, without long-term current use of insulin  -     Hemoglobin A1C; Future; Expected date: 05/26/2023    Mixed hyperlipidemia  -     Lipids Digital Medicine (LDMP) Enrollment Order      Treatment options and alternatives were discussed with the patient. Patient was given ample time to ask questions. All questions were answered. Voices understanding and acceptance of this advice. Will call back if any further questions or concerns.    Antionette Son MD

## 2023-06-20 ENCOUNTER — LAB VISIT (OUTPATIENT)
Dept: LAB | Facility: HOSPITAL | Age: 83
End: 2023-06-20
Attending: FAMILY MEDICINE
Payer: MEDICARE

## 2023-06-20 ENCOUNTER — OFFICE VISIT (OUTPATIENT)
Dept: PRIMARY CARE CLINIC | Facility: CLINIC | Age: 83
End: 2023-06-20
Payer: MEDICARE

## 2023-06-20 VITALS
OXYGEN SATURATION: 98 % | TEMPERATURE: 99 F | HEIGHT: 67 IN | DIASTOLIC BLOOD PRESSURE: 60 MMHG | BODY MASS INDEX: 23.33 KG/M2 | SYSTOLIC BLOOD PRESSURE: 138 MMHG | WEIGHT: 148.63 LBS | HEART RATE: 69 BPM

## 2023-06-20 DIAGNOSIS — L85.3 XEROSIS OF SKIN: ICD-10-CM

## 2023-06-20 DIAGNOSIS — E11.22 HYPERTENSION ASSOCIATED WITH STAGE 3 CHRONIC KIDNEY DISEASE DUE TO TYPE 2 DIABETES MELLITUS: ICD-10-CM

## 2023-06-20 DIAGNOSIS — N18.30 HYPERTENSION ASSOCIATED WITH STAGE 3 CHRONIC KIDNEY DISEASE DUE TO TYPE 2 DIABETES MELLITUS: ICD-10-CM

## 2023-06-20 DIAGNOSIS — I12.9 HYPERTENSION ASSOCIATED WITH STAGE 3 CHRONIC KIDNEY DISEASE DUE TO TYPE 2 DIABETES MELLITUS: ICD-10-CM

## 2023-06-20 DIAGNOSIS — E11.42 DM TYPE 2 WITH DIABETIC PERIPHERAL NEUROPATHY: Primary | ICD-10-CM

## 2023-06-20 DIAGNOSIS — M17.5 OTHER SECONDARY OSTEOARTHRITIS OF RIGHT KNEE: ICD-10-CM

## 2023-06-20 DIAGNOSIS — E11.42 DM TYPE 2 WITH DIABETIC PERIPHERAL NEUROPATHY: ICD-10-CM

## 2023-06-20 PROCEDURE — 1101F PT FALLS ASSESS-DOCD LE1/YR: CPT | Mod: CPTII,S$GLB,, | Performed by: FAMILY MEDICINE

## 2023-06-20 PROCEDURE — 99214 PR OFFICE/OUTPT VISIT, EST, LEVL IV, 30-39 MIN: ICD-10-PCS | Mod: S$GLB,,, | Performed by: FAMILY MEDICINE

## 2023-06-20 PROCEDURE — 1101F PR PT FALLS ASSESS DOC 0-1 FALLS W/OUT INJ PAST YR: ICD-10-PCS | Mod: CPTII,S$GLB,, | Performed by: FAMILY MEDICINE

## 2023-06-20 PROCEDURE — 1159F PR MEDICATION LIST DOCUMENTED IN MEDICAL RECORD: ICD-10-PCS | Mod: CPTII,S$GLB,, | Performed by: FAMILY MEDICINE

## 2023-06-20 PROCEDURE — 3075F PR MOST RECENT SYSTOLIC BLOOD PRESS GE 130-139MM HG: ICD-10-PCS | Mod: CPTII,S$GLB,, | Performed by: FAMILY MEDICINE

## 2023-06-20 PROCEDURE — 83036 HEMOGLOBIN GLYCOSYLATED A1C: CPT | Performed by: FAMILY MEDICINE

## 2023-06-20 PROCEDURE — 3078F DIAST BP <80 MM HG: CPT | Mod: CPTII,S$GLB,, | Performed by: FAMILY MEDICINE

## 2023-06-20 PROCEDURE — 99214 OFFICE O/P EST MOD 30 MIN: CPT | Mod: S$GLB,,, | Performed by: FAMILY MEDICINE

## 2023-06-20 PROCEDURE — 3288F FALL RISK ASSESSMENT DOCD: CPT | Mod: CPTII,S$GLB,, | Performed by: FAMILY MEDICINE

## 2023-06-20 PROCEDURE — 3288F PR FALLS RISK ASSESSMENT DOCUMENTED: ICD-10-PCS | Mod: CPTII,S$GLB,, | Performed by: FAMILY MEDICINE

## 2023-06-20 PROCEDURE — 3078F PR MOST RECENT DIASTOLIC BLOOD PRESSURE < 80 MM HG: ICD-10-PCS | Mod: CPTII,S$GLB,, | Performed by: FAMILY MEDICINE

## 2023-06-20 PROCEDURE — 36415 COLL VENOUS BLD VENIPUNCTURE: CPT | Mod: PN | Performed by: FAMILY MEDICINE

## 2023-06-20 PROCEDURE — 99999 PR PBB SHADOW E&M-EST. PATIENT-LVL IV: CPT | Mod: PBBFAC,,, | Performed by: FAMILY MEDICINE

## 2023-06-20 PROCEDURE — 99999 PR PBB SHADOW E&M-EST. PATIENT-LVL IV: ICD-10-PCS | Mod: PBBFAC,,, | Performed by: FAMILY MEDICINE

## 2023-06-20 PROCEDURE — 1126F PR PAIN SEVERITY QUANTIFIED, NO PAIN PRESENT: ICD-10-PCS | Mod: CPTII,S$GLB,, | Performed by: FAMILY MEDICINE

## 2023-06-20 PROCEDURE — 1126F AMNT PAIN NOTED NONE PRSNT: CPT | Mod: CPTII,S$GLB,, | Performed by: FAMILY MEDICINE

## 2023-06-20 PROCEDURE — 1159F MED LIST DOCD IN RCRD: CPT | Mod: CPTII,S$GLB,, | Performed by: FAMILY MEDICINE

## 2023-06-20 PROCEDURE — 3075F SYST BP GE 130 - 139MM HG: CPT | Mod: CPTII,S$GLB,, | Performed by: FAMILY MEDICINE

## 2023-06-20 RX ORDER — AMMONIUM LACTATE 12 G/100G
LOTION TOPICAL
Qty: 400 ML | Refills: 3 | Status: SHIPPED | OUTPATIENT
Start: 2023-06-20 | End: 2024-02-07 | Stop reason: SDUPTHER

## 2023-06-20 RX ORDER — GABAPENTIN 100 MG/1
100 CAPSULE ORAL 2 TIMES DAILY
Qty: 60 CAPSULE | Refills: 5 | Status: SHIPPED | OUTPATIENT
Start: 2023-06-20 | End: 2023-10-30 | Stop reason: SDUPTHER

## 2023-06-21 LAB
ESTIMATED AVG GLUCOSE: 177 MG/DL (ref 68–131)
HBA1C MFR BLD: 7.8 % (ref 4–5.6)

## 2023-06-21 NOTE — PROGRESS NOTES
Your A1C is within normal limits, meaning that your diabetes is controlled.  Please continue all your medications and dietary recommendations to maintain it.  Repeat A1c in 6 mons

## 2023-08-17 ENCOUNTER — PATIENT MESSAGE (OUTPATIENT)
Dept: PRIMARY CARE CLINIC | Facility: CLINIC | Age: 83
End: 2023-08-17
Payer: MEDICAID

## 2023-08-28 ENCOUNTER — LAB VISIT (OUTPATIENT)
Dept: LAB | Facility: HOSPITAL | Age: 83
End: 2023-08-28
Attending: FAMILY MEDICINE
Payer: MEDICARE

## 2023-08-28 ENCOUNTER — OFFICE VISIT (OUTPATIENT)
Dept: PRIMARY CARE CLINIC | Facility: CLINIC | Age: 83
End: 2023-08-28
Payer: MEDICARE

## 2023-08-28 VITALS
DIASTOLIC BLOOD PRESSURE: 64 MMHG | WEIGHT: 146.19 LBS | BODY MASS INDEX: 22.94 KG/M2 | OXYGEN SATURATION: 97 % | TEMPERATURE: 99 F | SYSTOLIC BLOOD PRESSURE: 120 MMHG | HEIGHT: 67 IN | HEART RATE: 73 BPM

## 2023-08-28 DIAGNOSIS — E78.2 MIXED HYPERLIPIDEMIA: ICD-10-CM

## 2023-08-28 DIAGNOSIS — D50.8 OTHER IRON DEFICIENCY ANEMIA: ICD-10-CM

## 2023-08-28 DIAGNOSIS — E11.22 CKD STAGE 3 DUE TO TYPE 2 DIABETES MELLITUS: ICD-10-CM

## 2023-08-28 DIAGNOSIS — E11.42 DM TYPE 2 WITH DIABETIC PERIPHERAL NEUROPATHY: Primary | ICD-10-CM

## 2023-08-28 DIAGNOSIS — I10 ESSENTIAL HYPERTENSION: ICD-10-CM

## 2023-08-28 DIAGNOSIS — N18.30 CKD STAGE 3 DUE TO TYPE 2 DIABETES MELLITUS: ICD-10-CM

## 2023-08-28 DIAGNOSIS — E55.9 VITAMIN D DEFICIENCY: ICD-10-CM

## 2023-08-28 PROCEDURE — 3074F SYST BP LT 130 MM HG: CPT | Mod: CPTII,S$GLB,, | Performed by: FAMILY MEDICINE

## 2023-08-28 PROCEDURE — 1159F MED LIST DOCD IN RCRD: CPT | Mod: CPTII,S$GLB,, | Performed by: FAMILY MEDICINE

## 2023-08-28 PROCEDURE — 3074F PR MOST RECENT SYSTOLIC BLOOD PRESSURE < 130 MM HG: ICD-10-PCS | Mod: CPTII,S$GLB,, | Performed by: FAMILY MEDICINE

## 2023-08-28 PROCEDURE — 1101F PT FALLS ASSESS-DOCD LE1/YR: CPT | Mod: CPTII,S$GLB,, | Performed by: FAMILY MEDICINE

## 2023-08-28 PROCEDURE — 1126F AMNT PAIN NOTED NONE PRSNT: CPT | Mod: CPTII,S$GLB,, | Performed by: FAMILY MEDICINE

## 2023-08-28 PROCEDURE — 80061 LIPID PANEL: CPT | Performed by: FAMILY MEDICINE

## 2023-08-28 PROCEDURE — 99999 PR PBB SHADOW E&M-EST. PATIENT-LVL IV: ICD-10-PCS | Mod: PBBFAC,,, | Performed by: FAMILY MEDICINE

## 2023-08-28 PROCEDURE — 99214 OFFICE O/P EST MOD 30 MIN: CPT | Mod: S$GLB,,, | Performed by: FAMILY MEDICINE

## 2023-08-28 PROCEDURE — 3078F PR MOST RECENT DIASTOLIC BLOOD PRESSURE < 80 MM HG: ICD-10-PCS | Mod: CPTII,S$GLB,, | Performed by: FAMILY MEDICINE

## 2023-08-28 PROCEDURE — 3078F DIAST BP <80 MM HG: CPT | Mod: CPTII,S$GLB,, | Performed by: FAMILY MEDICINE

## 2023-08-28 PROCEDURE — 99214 PR OFFICE/OUTPT VISIT, EST, LEVL IV, 30-39 MIN: ICD-10-PCS | Mod: S$GLB,,, | Performed by: FAMILY MEDICINE

## 2023-08-28 PROCEDURE — 80069 RENAL FUNCTION PANEL: CPT | Performed by: FAMILY MEDICINE

## 2023-08-28 PROCEDURE — 1126F PR PAIN SEVERITY QUANTIFIED, NO PAIN PRESENT: ICD-10-PCS | Mod: CPTII,S$GLB,, | Performed by: FAMILY MEDICINE

## 2023-08-28 PROCEDURE — 3288F FALL RISK ASSESSMENT DOCD: CPT | Mod: CPTII,S$GLB,, | Performed by: FAMILY MEDICINE

## 2023-08-28 PROCEDURE — 3288F PR FALLS RISK ASSESSMENT DOCUMENTED: ICD-10-PCS | Mod: CPTII,S$GLB,, | Performed by: FAMILY MEDICINE

## 2023-08-28 PROCEDURE — 1101F PR PT FALLS ASSESS DOC 0-1 FALLS W/OUT INJ PAST YR: ICD-10-PCS | Mod: CPTII,S$GLB,, | Performed by: FAMILY MEDICINE

## 2023-08-28 PROCEDURE — 36415 COLL VENOUS BLD VENIPUNCTURE: CPT | Mod: PN | Performed by: FAMILY MEDICINE

## 2023-08-28 PROCEDURE — 1159F PR MEDICATION LIST DOCUMENTED IN MEDICAL RECORD: ICD-10-PCS | Mod: CPTII,S$GLB,, | Performed by: FAMILY MEDICINE

## 2023-08-28 PROCEDURE — 99999 PR PBB SHADOW E&M-EST. PATIENT-LVL IV: CPT | Mod: PBBFAC,,, | Performed by: FAMILY MEDICINE

## 2023-08-28 PROCEDURE — 85025 COMPLETE CBC W/AUTO DIFF WBC: CPT | Performed by: FAMILY MEDICINE

## 2023-08-28 RX ORDER — ERGOCALCIFEROL 1.25 MG/1
50000 CAPSULE ORAL WEEKLY
Qty: 12 CAPSULE | Refills: 3 | Status: SHIPPED | OUTPATIENT
Start: 2023-08-28 | End: 2024-02-07 | Stop reason: SDUPTHER

## 2023-08-28 RX ORDER — FERROUS SULFATE 325(65) MG
325 TABLET ORAL
Qty: 90 TABLET | Refills: 1 | Status: SHIPPED | OUTPATIENT
Start: 2023-08-28 | End: 2024-02-07 | Stop reason: SDUPTHER

## 2023-08-28 NOTE — PROGRESS NOTES
Subjective:       Patient ID: Darshana Fortune is a 82 y.o. female.    Chief Complaint: Other Misc (Bilateral leg pain at night/med refill)      History of Present Illness:   Darshana Fortune 82 y.o. female presents today with       Additional comments: Bilateral leg pain at night/med refill    She is here with her daughter  Lives with-  Depression-NA  Functional assessment-timed up and go-good  MMSE-NA  Sleep- wakes up at 3 in the morning and she also ahs bilateral feet pain, the tingling is resolcved, she is taking gabapentin 100mg at night only  Energy level- well  Eating-good  Continence-NA  Home health- NA  Equiptment at home-NA  Advance care planning:NA  Problem today-  See above.  She has MAITE on MVI daily, daughter asking for supplement.  CKD: due to DM II, needs lab update   Past Medical History:   Diagnosis Date    Cerebral aneurysm without rupture 1998    went to [facility] in [place]    Diabetes mellitus     Hypertension     Malignant neoplasm of left kidney 2017    removed     History reviewed. No pertinent family history.  Social History     Socioeconomic History    Marital status:    Tobacco Use    Smoking status: Never    Smokeless tobacco: Never   Substance and Sexual Activity    Alcohol use: No    Drug use: No     Social Determinants of Health     Financial Resource Strain: High Risk (2/27/2023)    Overall Financial Resource Strain (CARDIA)     Difficulty of Paying Living Expenses: Hard   Food Insecurity: No Food Insecurity (2/27/2023)    Hunger Vital Sign     Worried About Running Out of Food in the Last Year: Never true     Ran Out of Food in the Last Year: Never true   Transportation Needs: No Transportation Needs (2/27/2023)    PRAPARE - Transportation     Lack of Transportation (Medical): No     Lack of Transportation (Non-Medical): No   Physical Activity: Sufficiently Active (2/27/2023)    Exercise Vital Sign     Days of Exercise per Week: 4 days     Minutes of  Exercise per Session: 60 min   Stress: No Stress Concern Present (2/27/2023)    [employer] - Occupational Stress Questionnaire     Feeling of Stress : Not at all   Social Connections: Socially Isolated (2/27/2023)    Social Connection and Isolation Panel [NHANES]     Frequency of Communication with Friends and Family: More than three times a week     Frequency of Social Gatherings with Friends and Family: More than three times a week     Attends Latter day Services: Never     Active Member of Clubs or Organizations: No     Attends Club or Organization Meetings: Never     Marital Status:    Housing Stability: Low Risk  (2/27/2023)    Housing Stability Vital Sign     Unable to Pay for Housing in the Last Year: No     Number of Places Lived in the Last Year: 1     Unstable Housing in the Last Year: No     Outpatient Encounter Medications as of 8/28/2023   Medication Sig Dispense Refill    amLODIPine (NORVASC) 10 MG tablet Take 1 tablet (10 mg total) by mouth once daily. 90 tablet 3    ammonium lactate (LAC-HYDRIN) 12 % lotion Apply topically as needed for Dry Skin. 400 mL 3    atorvastatin (LIPITOR) 20 MG tablet Take 1 tablet (20 mg total) by mouth every evening. 90 tablet 3    cloNIDine 0.3 mg/24 hr td ptwk (CATAPRES) 0.3 mg/24 hr Place 1 patch onto the skin every 7 days. 12 patch 3    ergocalciferol (ERGOCALCIFEROL) 50,000 unit Cap Take 1 capsule (50,000 Units total) by mouth once a week. 12 capsule 3    ferrous sulfate (IRON) 325 mg (65 mg iron) Tab tablet Take 1 tablet (325 mg total) by mouth daily with breakfast. 90 tablet 1    gabapentin (NEURONTIN) 100 MG capsule Take 1 capsule (100 mg total) by mouth 2 (two) times daily. 60 capsule 5    glimepiride (AMARYL) 4 MG tablet Take 1 tablet (4 mg total) by mouth before breakfast. 90 tablet 3    linaGLIPtin (TRADJENTA) 5 mg Tab tablet Take 1 tablet (5 mg total) by mouth once daily. 90 tablet 3    olmesartan  "(BENICAR) 40 MG tablet Take 1 tablet (40 mg total) by mouth once daily. 30 tablet 5     No facility-administered encounter medications on file as of 8/28/2023.       Review of Systems         A complete 10 point ROS was completed and are positive as per above HPI. Otherwise negative for fever, diplopia, chest pain, shortness of breath, vomiting, blood in urine, skin rash, seizures and unusual bleeding.     Objective:      /64 (BP Location: Right arm, Patient Position: Sitting, BP Method: Medium (Manual))   Pulse 73   Temp 98.7 °F (37.1 °C) (Oral)   Ht 5' 7" (1.702 m)   Wt 66.3 kg (146 lb 3.2 oz)   SpO2 97%   BMI 22.90 kg/m²   Physical Exam  Vitals and nursing note reviewed.   Constitutional:       General: She is not in acute distress.     Appearance: She is well-developed.   HENT:      Head: Normocephalic and atraumatic.      Right Ear: External ear normal.      Left Ear: External ear normal.   Eyes:      Conjunctiva/sclera: Conjunctivae normal.   Neck:      Thyroid: No thyromegaly.   Cardiovascular:      Rate and Rhythm: Normal rate and regular rhythm.      Pulses: Normal pulses.      Heart sounds: Normal heart sounds. No murmur heard.  Pulmonary:      Effort: Pulmonary effort is normal. No respiratory distress.      Breath sounds: Normal breath sounds.   Genitourinary:     Comments: deferred  Musculoskeletal:         General: No deformity.      Cervical back: Neck supple.   Lymphadenopathy:      Head:      Right side of head: No submandibular adenopathy.      Left side of head: No submandibular adenopathy.      Cervical: No cervical adenopathy.   Skin:     General: Skin is warm and dry.   Neurological:      Mental Status: She is alert and oriented to person, place, and time.   Psychiatric:         Behavior: Behavior normal.         Results for orders placed or performed in visit on 06/20/23   Hemoglobin A1C   Result Value Ref Range    Hemoglobin A1C 7.8 (H) 4.0 - 5.6 %    Estimated Avg Glucose 177 (H) " 68 - 131 mg/dL     Assessment:       1. DM type 2 with diabetic peripheral neuropathy    2. Essential hypertension    3. Mixed hyperlipidemia    4. CKD stage 3 due to type 2 diabetes mellitus    5. Vitamin D deficiency    6. Other iron deficiency anemia        Plan:   DM type 2 with diabetic peripheral neuropathy  Comments:  -will foot roller, and if not helping, then take gabapebtin 200mg at night  Orders:  -     Diabetes Digital Medicine (DDMP) Enrollment Order  -     Diabetes Digital Medicine (DDMP): Assign Onboarding Questionnaires    Essential hypertension  Comments:  goal at 130s/80s to help the kidneys not progress  Orders:  -     Hypertension Digital Medicine (HDMP) Enrollment Order  -     Hypertension Digital Medicine (HDMP): Assign Onboarding Questionnaires  -     CBC Auto Differential; Future; Expected date: 08/28/2023    Mixed hyperlipidemia  Comments:  ch stable  Orders:  -     Lipids Digital Medicine (LDMP) Enrollment Order  -     Lipids Digital Medicine (LDMP): Assign Onboarding Questionnaires  -     Lipid Panel; Future; Expected date: 08/28/2023    CKD stage 3 due to type 2 diabetes mellitus  Comments:  update the lab  Orders:  -     Renal Function Panel; Future; Expected date: 08/28/2023    Vitamin D deficiency  Comments:  due to ckd 3  Orders:  -     ergocalciferol (ERGOCALCIFEROL) 50,000 unit Cap; Take 1 capsule (50,000 Units total) by mouth once a week.  Dispense: 12 capsule; Refill: 3    Other iron deficiency anemia  Comments:  will replete and recheck status on her next visit  Orders:  -     ferrous sulfate (IRON) 325 mg (65 mg iron) Tab tablet; Take 1 tablet (325 mg total) by mouth daily with breakfast.  Dispense: 90 tablet; Refill: 1        I have reviewed all of the patient's clinical history available in care everywhere and Epic and have utilized this in my evaluation and management recommendations today.      Treatment options and alternatives were discussed with the patient. Patient was  given ample time to ask questions. All questions were answered. Voices understanding and acceptance of this advice. Will call back if any further questions or concerns.                Antionette Son MD  [facility],

## 2023-08-29 LAB
ALBUMIN SERPL BCP-MCNC: 3.4 G/DL (ref 3.5–5.2)
ANION GAP SERPL CALC-SCNC: 11 MMOL/L (ref 8–16)
BASOPHILS # BLD AUTO: 0.03 K/UL (ref 0–0.2)
BASOPHILS NFR BLD: 0.4 % (ref 0–1.9)
BUN SERPL-MCNC: 31 MG/DL (ref 8–23)
CALCIUM SERPL-MCNC: 10.5 MG/DL (ref 8.7–10.5)
CHLORIDE SERPL-SCNC: 104 MMOL/L (ref 95–110)
CHOLEST SERPL-MCNC: 135 MG/DL (ref 120–199)
CHOLEST/HDLC SERPL: 3 {RATIO} (ref 2–5)
CO2 SERPL-SCNC: 24 MMOL/L (ref 23–29)
CREAT SERPL-MCNC: 1.4 MG/DL (ref 0.5–1.4)
DIFFERENTIAL METHOD: ABNORMAL
EOSINOPHIL # BLD AUTO: 0.1 K/UL (ref 0–0.5)
EOSINOPHIL NFR BLD: 0.7 % (ref 0–8)
ERYTHROCYTE [DISTWIDTH] IN BLOOD BY AUTOMATED COUNT: 12.8 % (ref 11.5–14.5)
EST. GFR  (NO RACE VARIABLE): 37.3 ML/MIN/1.73 M^2
GLUCOSE SERPL-MCNC: 292 MG/DL (ref 70–110)
HCT VFR BLD AUTO: 33.6 % (ref 37–48.5)
HDLC SERPL-MCNC: 45 MG/DL (ref 40–75)
HDLC SERPL: 33.3 % (ref 20–50)
HGB BLD-MCNC: 10.2 G/DL (ref 12–16)
IMM GRANULOCYTES # BLD AUTO: 0.03 K/UL (ref 0–0.04)
IMM GRANULOCYTES NFR BLD AUTO: 0.4 % (ref 0–0.5)
LDLC SERPL CALC-MCNC: 78.4 MG/DL (ref 63–159)
LYMPHOCYTES # BLD AUTO: 1.7 K/UL (ref 1–4.8)
LYMPHOCYTES NFR BLD: 21.1 % (ref 18–48)
MCH RBC QN AUTO: 29.2 PG (ref 27–31)
MCHC RBC AUTO-ENTMCNC: 30.4 G/DL (ref 32–36)
MCV RBC AUTO: 96 FL (ref 82–98)
MONOCYTES # BLD AUTO: 0.6 K/UL (ref 0.3–1)
MONOCYTES NFR BLD: 7.3 % (ref 4–15)
NEUTROPHILS # BLD AUTO: 5.8 K/UL (ref 1.8–7.7)
NEUTROPHILS NFR BLD: 70.1 % (ref 38–73)
NONHDLC SERPL-MCNC: 90 MG/DL
NRBC BLD-RTO: 0 /100 WBC
PHOSPHATE SERPL-MCNC: 3.9 MG/DL (ref 2.7–4.5)
PLATELET # BLD AUTO: 418 K/UL (ref 150–450)
PMV BLD AUTO: 9.4 FL (ref 9.2–12.9)
POTASSIUM SERPL-SCNC: 4.9 MMOL/L (ref 3.5–5.1)
RBC # BLD AUTO: 3.49 M/UL (ref 4–5.4)
SODIUM SERPL-SCNC: 139 MMOL/L (ref 136–145)
TRIGL SERPL-MCNC: 58 MG/DL (ref 30–150)
WBC # BLD AUTO: 8.2 K/UL (ref 3.9–12.7)

## 2023-08-31 DIAGNOSIS — N18.30 CKD STAGE 3 DUE TO TYPE 2 DIABETES MELLITUS: Primary | ICD-10-CM

## 2023-08-31 DIAGNOSIS — E11.22 CKD STAGE 3 DUE TO TYPE 2 DIABETES MELLITUS: Primary | ICD-10-CM

## 2023-08-31 NOTE — PROGRESS NOTES
I have reviewed your lab result and one or more results are ABNORMAL  Your cholesterol has improved.  However, you have worsening kidney function, anemia and blood glucose.  Take all your medications as ordered, drink when thirsty.  Referral has been sent to the kidney specialist and expect a call for an appt.

## 2023-09-01 ENCOUNTER — TELEPHONE (OUTPATIENT)
Dept: PRIMARY CARE CLINIC | Facility: CLINIC | Age: 83
End: 2023-09-01
Payer: MEDICAID

## 2023-09-05 RX ORDER — OLMESARTAN MEDOXOMIL 40 MG/1
40 TABLET ORAL
Qty: 90 TABLET | Refills: 3 | Status: SHIPPED | OUTPATIENT
Start: 2023-09-05 | End: 2024-02-07 | Stop reason: SDUPTHER

## 2023-09-05 NOTE — TELEPHONE ENCOUNTER
Refill Decision Note      Refill Decision Note   Darshana Fortune  is requesting a refill authorization.  Brief Assessment and Rationale for Refill:  Approve     Medication Therapy Plan:         Comments:     Note composed:5:17 AM 09/05/2023             Appointments     Last Visit   8/28/2023 Antionette Son MD   Next Visit   10/30/2023 Antionette Son MD

## 2023-09-05 NOTE — TELEPHONE ENCOUNTER
No care due was identified.  [facility] Embedded Care Due Messages. Reference number: [id number].   9/05/2023 12:16:32 AM CDT

## 2023-09-06 ENCOUNTER — TELEPHONE (OUTPATIENT)
Dept: PRIMARY CARE CLINIC | Facility: CLINIC | Age: 83
End: 2023-09-06
Payer: MEDICAID

## 2023-09-06 NOTE — TELEPHONE ENCOUNTER
Returned call to patient grandchild spoke with pt as well. She was inquiring if Nephrology referral was placed b/c GI contacted them. Confirm referral was placed and referral dept will contact to schedule. Advised to contact scheduling [phone] if not contacted within 7-10 business days. Informed message will be sent to ask provider if colonoscopy is needed. She voiced understanding.

## 2023-09-06 NOTE — TELEPHONE ENCOUNTER
----- Message from Feliberto Arambula sent at 9/6/2023  2:44 PM CDT -----  Contact: [phone]  [email or web address]     Patient is calling for Medical Advice regarding:some referrals     How long has patient had these symptoms:    Pharmacy name and phone#:    Would like response via [employer]:  call back   Comments:    Pt needs a call back

## 2023-09-21 ENCOUNTER — PATIENT MESSAGE (OUTPATIENT)
Dept: PRIMARY CARE CLINIC | Facility: CLINIC | Age: 83
End: 2023-09-21
Payer: MEDICAID

## 2023-09-21 DIAGNOSIS — N18.31 STAGE 3A CHRONIC KIDNEY DISEASE: Primary | ICD-10-CM

## 2023-09-23 ENCOUNTER — LAB VISIT (OUTPATIENT)
Dept: LAB | Facility: HOSPITAL | Age: 83
End: 2023-09-23
Attending: INTERNAL MEDICINE
Payer: MEDICARE

## 2023-09-23 DIAGNOSIS — N18.31 STAGE 3A CHRONIC KIDNEY DISEASE: ICD-10-CM

## 2023-09-23 LAB
ALBUMIN SERPL BCP-MCNC: 3.8 G/DL (ref 3.5–5.2)
ANION GAP SERPL CALC-SCNC: 8 MMOL/L (ref 8–16)
BUN SERPL-MCNC: 25 MG/DL (ref 8–23)
CALCIUM SERPL-MCNC: 10.3 MG/DL (ref 8.7–10.5)
CHLORIDE SERPL-SCNC: 104 MMOL/L (ref 95–110)
CO2 SERPL-SCNC: 25 MMOL/L (ref 23–29)
CREAT SERPL-MCNC: 1.3 MG/DL (ref 0.5–1.4)
EST. GFR  (NO RACE VARIABLE): 40.8 ML/MIN/1.73 M^2
GLUCOSE SERPL-MCNC: 202 MG/DL (ref 70–110)
PHOSPHATE SERPL-MCNC: 3.3 MG/DL (ref 2.7–4.5)
POTASSIUM SERPL-SCNC: 4.4 MMOL/L (ref 3.5–5.1)
SODIUM SERPL-SCNC: 137 MMOL/L (ref 136–145)

## 2023-09-23 PROCEDURE — 80069 RENAL FUNCTION PANEL: CPT | Performed by: INTERNAL MEDICINE

## 2023-09-23 PROCEDURE — 36415 COLL VENOUS BLD VENIPUNCTURE: CPT | Performed by: INTERNAL MEDICINE

## 2023-09-25 ENCOUNTER — OFFICE VISIT (OUTPATIENT)
Dept: NEPHROLOGY | Facility: CLINIC | Age: 83
End: 2023-09-25
Payer: MEDICARE

## 2023-09-25 VITALS
WEIGHT: 146.19 LBS | SYSTOLIC BLOOD PRESSURE: 142 MMHG | DIASTOLIC BLOOD PRESSURE: 62 MMHG | HEART RATE: 90 BPM | BODY MASS INDEX: 22.16 KG/M2 | HEIGHT: 68 IN

## 2023-09-25 DIAGNOSIS — I10 PRIMARY HYPERTENSION: ICD-10-CM

## 2023-09-25 DIAGNOSIS — N18.30 CKD STAGE 3 DUE TO TYPE 2 DIABETES MELLITUS: ICD-10-CM

## 2023-09-25 DIAGNOSIS — E11.22 CKD STAGE 3 DUE TO TYPE 2 DIABETES MELLITUS: ICD-10-CM

## 2023-09-25 DIAGNOSIS — N18.32 STAGE 3B CHRONIC KIDNEY DISEASE: Primary | ICD-10-CM

## 2023-09-25 PROCEDURE — 99999 PR PBB SHADOW E&M-EST. PATIENT-LVL III: ICD-10-PCS | Mod: PBBFAC,,, | Performed by: INTERNAL MEDICINE

## 2023-09-25 PROCEDURE — 3288F FALL RISK ASSESSMENT DOCD: CPT | Mod: CPTII,S$GLB,, | Performed by: INTERNAL MEDICINE

## 2023-09-25 PROCEDURE — 1159F MED LIST DOCD IN RCRD: CPT | Mod: CPTII,S$GLB,, | Performed by: INTERNAL MEDICINE

## 2023-09-25 PROCEDURE — 3077F SYST BP >= 140 MM HG: CPT | Mod: CPTII,S$GLB,, | Performed by: INTERNAL MEDICINE

## 2023-09-25 PROCEDURE — 1160F PR REVIEW ALL MEDS BY PRESCRIBER/CLIN PHARMACIST DOCUMENTED: ICD-10-PCS | Mod: CPTII,S$GLB,, | Performed by: INTERNAL MEDICINE

## 2023-09-25 PROCEDURE — 1126F PR PAIN SEVERITY QUANTIFIED, NO PAIN PRESENT: ICD-10-PCS | Mod: CPTII,S$GLB,, | Performed by: INTERNAL MEDICINE

## 2023-09-25 PROCEDURE — 1101F PR PT FALLS ASSESS DOC 0-1 FALLS W/OUT INJ PAST YR: ICD-10-PCS | Mod: CPTII,S$GLB,, | Performed by: INTERNAL MEDICINE

## 2023-09-25 PROCEDURE — 99204 PR OFFICE/OUTPT VISIT, NEW, LEVL IV, 45-59 MIN: ICD-10-PCS | Mod: S$GLB,,, | Performed by: INTERNAL MEDICINE

## 2023-09-25 PROCEDURE — 3078F PR MOST RECENT DIASTOLIC BLOOD PRESSURE < 80 MM HG: ICD-10-PCS | Mod: CPTII,S$GLB,, | Performed by: INTERNAL MEDICINE

## 2023-09-25 PROCEDURE — 99204 OFFICE O/P NEW MOD 45 MIN: CPT | Mod: S$GLB,,, | Performed by: INTERNAL MEDICINE

## 2023-09-25 PROCEDURE — 1126F AMNT PAIN NOTED NONE PRSNT: CPT | Mod: CPTII,S$GLB,, | Performed by: INTERNAL MEDICINE

## 2023-09-25 PROCEDURE — 99999 PR PBB SHADOW E&M-EST. PATIENT-LVL III: CPT | Mod: PBBFAC,,, | Performed by: INTERNAL MEDICINE

## 2023-09-25 PROCEDURE — 1159F PR MEDICATION LIST DOCUMENTED IN MEDICAL RECORD: ICD-10-PCS | Mod: CPTII,S$GLB,, | Performed by: INTERNAL MEDICINE

## 2023-09-25 PROCEDURE — 3077F PR MOST RECENT SYSTOLIC BLOOD PRESSURE >= 140 MM HG: ICD-10-PCS | Mod: CPTII,S$GLB,, | Performed by: INTERNAL MEDICINE

## 2023-09-25 PROCEDURE — 3288F PR FALLS RISK ASSESSMENT DOCUMENTED: ICD-10-PCS | Mod: CPTII,S$GLB,, | Performed by: INTERNAL MEDICINE

## 2023-09-25 PROCEDURE — 1160F RVW MEDS BY RX/DR IN RCRD: CPT | Mod: CPTII,S$GLB,, | Performed by: INTERNAL MEDICINE

## 2023-09-25 PROCEDURE — 1101F PT FALLS ASSESS-DOCD LE1/YR: CPT | Mod: CPTII,S$GLB,, | Performed by: INTERNAL MEDICINE

## 2023-09-25 PROCEDURE — 3078F DIAST BP <80 MM HG: CPT | Mod: CPTII,S$GLB,, | Performed by: INTERNAL MEDICINE

## 2023-09-25 NOTE — PROGRESS NOTES
Darshana Fortune is a 82 y.o. female    HPI:    She was noted to have decreased EGFR on routine laboratory studies.  Nephrology has been consulted for evaluation.  She is accompanied today by her daughter.  In the clinic today she has no specific complaints or issues.  Her laboratory studies and medications were reviewed.  All Nephrology related questions were answered to her satisfaction.  On review of laboratory studies her creatinine tends to vary between 1.2 and 1.4 and has done so for the past year.    PAST MEDICAL HISTORY:  She  has a past medical history of Cerebral aneurysm without rupture (1998), Diabetes mellitus, Hypertension, and Malignant neoplasm of left kidney (2017).    PAST SURGICAL HISTORY:  She  has a past surgical history that includes Hysterectomy and Kidney surgery (Left, 2017).    SOCIAL HISTORY:  She  reports that she has never smoked. She has never used smokeless tobacco. She reports that she does not drink alcohol and does not use drugs.      FAMILY MEDICAL HISTORY:  Her family history is not on file.    Review of patient's allergies indicates:   Allergen Reactions    Aspirin            Prior to Admission medications    Medication Sig Start Date End Date Taking? Authorizing Provider   amLODIPine (NORVASC) 10 MG tablet Take 1 tablet (10 mg total) by mouth once daily. 2/27/23 9/25/23 Yes Antionette Son MD   ammonium lactate (LAC-HYDRIN) 12 % lotion Apply topically as needed for Dry Skin. 6/20/23  Yes Antionette Son MD   atorvastatin (LIPITOR) 20 MG tablet Take 1 tablet (20 mg total) by mouth every evening. 2/27/23 9/25/23 Yes Antionette Son MD   cloNIDine 0.3 mg/24 hr td ptwk (CATAPRES) 0.3 mg/24 hr Place 1 patch onto the skin every 7 days. 2/27/23 9/25/23 Yes Antionette Son MD   ergocalciferol (ERGOCALCIFEROL) 50,000 unit Cap Take 1 capsule (50,000 Units total) by mouth once a week. 8/28/23 8/27/24 Yes Antionette Son MD   ferrous sulfate (IRON) 325 mg (65 mg iron) Tab tablet  Take 1 tablet (325 mg total) by mouth daily with breakfast. 8/28/23 2/24/24 Yes Antionette Son MD   gabapentin (NEURONTIN) 100 MG capsule Take 1 capsule (100 mg total) by mouth 2 (two) times daily. 6/20/23 9/25/23 Yes Antionette Son MD   glimepiride (AMARYL) 4 MG tablet Take 1 tablet (4 mg total) by mouth before breakfast. 2/27/23 9/25/23 Yes Antionette Son MD   linaGLIPtin (TRADJENTA) 5 mg Tab tablet Take 1 tablet (5 mg total) by mouth once daily. 2/27/23 9/25/23 Yes Antionette Son MD   olmesartan (BENICAR) 40 MG tablet TAKE 1 TABLET BY MOUTH EVERY DAY 9/5/23  Yes Antionette Son MD      Sodium   Date Value Ref Range Status   09/23/2023 137 136 - 145 mmol/L Final   08/28/2023 139 136 - 145 mmol/L Final   02/27/2023 140 136 - 145 mmol/L Final     Potassium   Date Value Ref Range Status   09/23/2023 4.4 3.5 - 5.1 mmol/L Final   08/28/2023 4.9 3.5 - 5.1 mmol/L Final   02/27/2023 4.4 3.5 - 5.1 mmol/L Final     Chloride   Date Value Ref Range Status   09/23/2023 104 95 - 110 mmol/L Final   08/28/2023 104 95 - 110 mmol/L Final   02/27/2023 105 95 - 110 mmol/L Final     CO2   Date Value Ref Range Status   09/23/2023 25 23 - 29 mmol/L Final   08/28/2023 24 23 - 29 mmol/L Final   02/27/2023 26 23 - 29 mmol/L Final     Glucose   Date Value Ref Range Status   09/23/2023 202 (H) 70 - 110 mg/dL Final   08/28/2023 292 (H) 70 - 110 mg/dL Final   02/27/2023 155 (H) 70 - 110 mg/dL Final     BUN   Date Value Ref Range Status   09/23/2023 25 (H) 8 - 23 mg/dL Final   08/28/2023 31 (H) 8 - 23 mg/dL Final   02/27/2023 30 (H) 8 - 23 mg/dL Final     Creatinine   Date Value Ref Range Status   09/23/2023 1.3 0.5 - 1.4 mg/dL Final   08/28/2023 1.4 0.5 - 1.4 mg/dL Final   02/27/2023 1.2 0.5 - 1.4 mg/dL Final     Calcium   Date Value Ref Range Status   09/23/2023 10.3 8.7 - 10.5 mg/dL Final   08/28/2023 10.5 8.7 - 10.5 mg/dL Final   02/27/2023 10.7 (H) 8.7 - 10.5 mg/dL Final     Total Protein   Date Value Ref Range Status    02/27/2023 8.6 (H) 6.0 - 8.4 g/dL Final   07/15/2022 8.9 (H) 6.0 - 8.4 g/dL Final   12/01/2021 7.9 6.0 - 8.4 g/dL Final     Albumin   Date Value Ref Range Status   09/23/2023 3.8 3.5 - 5.2 g/dL Final   08/28/2023 3.4 (L) 3.5 - 5.2 g/dL Final   02/27/2023 4.1 3.5 - 5.2 g/dL Final     Total Bilirubin   Date Value Ref Range Status   02/27/2023 0.3 0.1 - 1.0 mg/dL Final     Comment:     For infants and newborns, interpretation of results should be based  on gestational age, weight and in agreement with clinical  observations.    Premature Infant recommended reference ranges:  Up to 24 hours.............<8.0 mg/dL  Up to 48 hours............<12.0 mg/dL  3-5 days..................<15.0 mg/dL  6-29 days.................<15.0 mg/dL     07/15/2022 0.4 0.1 - 1.0 mg/dL Final     Comment:     For infants and newborns, interpretation of results should be based  on gestational age, weight and in agreement with clinical  observations.    Premature Infant recommended reference ranges:  Up to 24 hours.............<8.0 mg/dL  Up to 48 hours............<12.0 mg/dL  3-5 days..................<15.0 mg/dL  6-29 days.................<15.0 mg/dL     12/01/2021 0.1 0.1 - 1.0 mg/dL Final     Comment:     For infants and newborns, interpretation of results should be based  on gestational age, weight and in agreement with clinical  observations.    Premature Infant recommended reference ranges:  Up to 24 hours.............<8.0 mg/dL  Up to 48 hours............<12.0 mg/dL  3-5 days..................<15.0 mg/dL  6-29 days.................<15.0 mg/dL       Alkaline Phosphatase   Date Value Ref Range Status   02/27/2023 71 55 - 135 U/L Final   07/15/2022 86 55 - 135 U/L Final   12/01/2021 99 55 - 135 U/L Final     AST   Date Value Ref Range Status   02/27/2023 20 10 - 40 U/L Final   07/15/2022 23 10 - 40 U/L Final   12/01/2021 24 10 - 40 U/L Final     ALT   Date Value Ref Range Status   02/27/2023 24 10 - 44 U/L Final   07/15/2022 21 10 - 44 U/L  "Final   12/01/2021 25 10 - 44 U/L Final     Anion Gap   Date Value Ref Range Status   09/23/2023 8 8 - 16 mmol/L Final   08/28/2023 11 8 - 16 mmol/L Final   02/27/2023 9 8 - 16 mmol/L Final     eGFR if    Date Value Ref Range Status   07/15/2022 44.1 (A) >60 mL/min/1.73 m^2 Final   12/01/2021 54.0 (A) >60 mL/min/1.73 m^2 Final   06/16/2021 54.4 (A) >60 mL/min/1.73 m^2 Final     eGFR if non    Date Value Ref Range Status   07/15/2022 38.3 (A) >60 mL/min/1.73 m^2 Final     Comment:     Calculation used to obtain the estimated glomerular filtration  rate (eGFR) is the CKD-EPI equation.      12/01/2021 46.9 (A) >60 mL/min/1.73 m^2 Final     Comment:     Calculation used to obtain the estimated glomerular filtration  rate (eGFR) is the CKD-EPI equation.      06/16/2021 47.2 (A) >60 mL/min/1.73 m^2 Final     Comment:     Calculation used to obtain the estimated glomerular filtration  rate (eGFR) is the CKD-EPI equation.        Creatinine, Urine   Date Value Ref Range Status   11/01/2022 98.0 15.0 - 325.0 mg/dL Final         REVIEW OF SYSTEMS:  Patient has no fever, fatigue, visual changes, chest pain, edema, cough, dyspnea, nausea, vomiting, constipation, diarrhea, arthralgias, pruritis, dizziness, weakness, depression, confusion.        PHYSICAL EXAM:   height is 5' 8" (1.727 m) and weight is 66.3 kg (146 lb 2.6 oz). Her blood pressure is 142/62 (abnormal) and her pulse is 90.   Gen: WDWN female in no apparent distress  Psych: Normal mood and affect  Skin: No rashes or ulcers  Eyes: Normal conjunctiva and lids, PERRLA  ENT: Normal hearing with no oropharyngeal lesions  Neck: No JVD  Chest: Clear with no rales, rhonchi, wheezing with normal effort  CV: Regular with no murmurs, gallops or rubs  Abd: Soft, nontender, no distension, positive bowel sounds  Ext: No cyanosis, clubbing or edema          IMPRESSION AND RECOMMENDATIONS:    1. CKD 3:  Creatinine has varied between 1.2 and 1.4 for the " past 6 months to a year.  EGFR is normal for her age and represents age related nephron dropout.  Urinalysis is bland without evidence of proteinuria.  There is no evidence of microalbuminuria.  She is on a RAAS inhibitor.    2. Hypertension:  Blood pressure is well controlled on current regimen.  Continue RAAS inhibition.

## 2023-10-30 ENCOUNTER — OFFICE VISIT (OUTPATIENT)
Dept: PRIMARY CARE CLINIC | Facility: CLINIC | Age: 83
End: 2023-10-30
Payer: MEDICARE

## 2023-10-30 VITALS
HEIGHT: 68 IN | HEART RATE: 65 BPM | TEMPERATURE: 98 F | SYSTOLIC BLOOD PRESSURE: 172 MMHG | DIASTOLIC BLOOD PRESSURE: 72 MMHG | WEIGHT: 145.19 LBS | BODY MASS INDEX: 22.01 KG/M2 | OXYGEN SATURATION: 97 %

## 2023-10-30 DIAGNOSIS — E78.2 MIXED HYPERLIPIDEMIA: ICD-10-CM

## 2023-10-30 DIAGNOSIS — E11.42 DM TYPE 2 WITH DIABETIC PERIPHERAL NEUROPATHY: ICD-10-CM

## 2023-10-30 DIAGNOSIS — E11.22 CKD STAGE 3 DUE TO TYPE 2 DIABETES MELLITUS: ICD-10-CM

## 2023-10-30 DIAGNOSIS — N18.30 CKD STAGE 3 DUE TO TYPE 2 DIABETES MELLITUS: ICD-10-CM

## 2023-10-30 DIAGNOSIS — E11.22 CONTROLLED TYPE 2 DIABETES MELLITUS WITH STAGE 3 CHRONIC KIDNEY DISEASE, WITHOUT LONG-TERM CURRENT USE OF INSULIN: ICD-10-CM

## 2023-10-30 DIAGNOSIS — M65.4 DE QUERVAIN'S TENOSYNOVITIS, LEFT: Primary | ICD-10-CM

## 2023-10-30 DIAGNOSIS — N18.30 CONTROLLED TYPE 2 DIABETES MELLITUS WITH STAGE 3 CHRONIC KIDNEY DISEASE, WITHOUT LONG-TERM CURRENT USE OF INSULIN: ICD-10-CM

## 2023-10-30 DIAGNOSIS — I10 ESSENTIAL HYPERTENSION: ICD-10-CM

## 2023-10-30 PROCEDURE — 3078F DIAST BP <80 MM HG: CPT | Mod: CPTII,S$GLB,, | Performed by: FAMILY MEDICINE

## 2023-10-30 PROCEDURE — 99214 PR OFFICE/OUTPT VISIT, EST, LEVL IV, 30-39 MIN: ICD-10-PCS | Mod: S$GLB,,, | Performed by: FAMILY MEDICINE

## 2023-10-30 PROCEDURE — 1101F PR PT FALLS ASSESS DOC 0-1 FALLS W/OUT INJ PAST YR: ICD-10-PCS | Mod: CPTII,S$GLB,, | Performed by: FAMILY MEDICINE

## 2023-10-30 PROCEDURE — 3288F PR FALLS RISK ASSESSMENT DOCUMENTED: ICD-10-PCS | Mod: CPTII,S$GLB,, | Performed by: FAMILY MEDICINE

## 2023-10-30 PROCEDURE — 99999 PR PBB SHADOW E&M-EST. PATIENT-LVL III: ICD-10-PCS | Mod: PBBFAC,,, | Performed by: FAMILY MEDICINE

## 2023-10-30 PROCEDURE — 3077F PR MOST RECENT SYSTOLIC BLOOD PRESSURE >= 140 MM HG: ICD-10-PCS | Mod: CPTII,S$GLB,, | Performed by: FAMILY MEDICINE

## 2023-10-30 PROCEDURE — 1159F MED LIST DOCD IN RCRD: CPT | Mod: CPTII,S$GLB,, | Performed by: FAMILY MEDICINE

## 2023-10-30 PROCEDURE — 99999 PR PBB SHADOW E&M-EST. PATIENT-LVL III: CPT | Mod: PBBFAC,,, | Performed by: FAMILY MEDICINE

## 2023-10-30 PROCEDURE — 1101F PT FALLS ASSESS-DOCD LE1/YR: CPT | Mod: CPTII,S$GLB,, | Performed by: FAMILY MEDICINE

## 2023-10-30 PROCEDURE — 3078F PR MOST RECENT DIASTOLIC BLOOD PRESSURE < 80 MM HG: ICD-10-PCS | Mod: CPTII,S$GLB,, | Performed by: FAMILY MEDICINE

## 2023-10-30 PROCEDURE — 3288F FALL RISK ASSESSMENT DOCD: CPT | Mod: CPTII,S$GLB,, | Performed by: FAMILY MEDICINE

## 2023-10-30 PROCEDURE — 1159F PR MEDICATION LIST DOCUMENTED IN MEDICAL RECORD: ICD-10-PCS | Mod: CPTII,S$GLB,, | Performed by: FAMILY MEDICINE

## 2023-10-30 PROCEDURE — 3077F SYST BP >= 140 MM HG: CPT | Mod: CPTII,S$GLB,, | Performed by: FAMILY MEDICINE

## 2023-10-30 PROCEDURE — 99214 OFFICE O/P EST MOD 30 MIN: CPT | Mod: S$GLB,,, | Performed by: FAMILY MEDICINE

## 2023-10-30 RX ORDER — HYDRALAZINE HYDROCHLORIDE 25 MG/1
25 TABLET, FILM COATED ORAL 2 TIMES DAILY
Qty: 60 TABLET | Refills: 0 | Status: SHIPPED | OUTPATIENT
Start: 2023-10-30 | End: 2023-11-22

## 2023-10-30 RX ORDER — GABAPENTIN 100 MG/1
100 CAPSULE ORAL 2 TIMES DAILY
Qty: 60 CAPSULE | Refills: 0 | Status: SHIPPED | OUTPATIENT
Start: 2023-10-30 | End: 2024-02-07 | Stop reason: SDUPTHER

## 2023-10-30 NOTE — PROGRESS NOTES
Subjective:       Patient ID: Darshana Fortune is a 83 y.o. female.    Chief Complaint: Left wrist pain and HTN      History of Present Illness:   Darshana Fortune 83 y.o. female presents today with   HPI     Other Misc     Additional comments: Open left wrist/bp          Last edited by Franny Huddleston MA on 10/30/2023 10:30 AM.      Here with her grand daughter  HTN: not controlled, blames it on the pain to the hand.   C/O left thump pain: along the pollicis longus tendon, tenderness, full active ROM but with discomfort. Wearing a soft thump spica to the left. Keeps her grand son and lifts him with the hand.      Past Medical History:   Diagnosis Date    Cerebral aneurysm without rupture 1998    went to [facility] in [place]    Diabetes mellitus     Hypertension     Malignant neoplasm of left kidney 2017    removed     History reviewed. No pertinent family history.  Social History     Socioeconomic History    Marital status:    Tobacco Use    Smoking status: Never    Smokeless tobacco: Never   Substance and Sexual Activity    Alcohol use: No    Drug use: No     Social Determinants of Health     Financial Resource Strain: High Risk (2/27/2023)    Overall Financial Resource Strain (CARDIA)     Difficulty of Paying Living Expenses: Hard   Food Insecurity: No Food Insecurity (2/27/2023)    Hunger Vital Sign     Worried About Running Out of Food in the Last Year: Never true     Ran Out of Food in the Last Year: Never true   Transportation Needs: No Transportation Needs (2/27/2023)    PRAPARE - Transportation     Lack of Transportation (Medical): No     Lack of Transportation (Non-Medical): No   Physical Activity: Sufficiently Active (2/27/2023)    Exercise Vital Sign     Days of Exercise per Week: 4 days     Minutes of Exercise per Session: 60 min   Stress: No Stress Concern Present (2/27/2023)    [employer] - Occupational Stress Questionnaire     Feeling of Stress :  Not at all   Social Connections: Socially Isolated (2/27/2023)    Social Connection and Isolation Panel [NHANES]     Frequency of Communication with Friends and Family: More than three times a week     Frequency of Social Gatherings with Friends and Family: More than three times a week     Attends Church Services: Never     Active Member of Clubs or Organizations: No     Attends Club or Organization Meetings: Never     Marital Status:    Housing Stability: Low Risk  (2/27/2023)    Housing Stability Vital Sign     Unable to Pay for Housing in the Last Year: No     Number of Places Lived in the Last Year: 1     Unstable Housing in the Last Year: No     Outpatient Encounter Medications as of 10/30/2023   Medication Sig Dispense Refill    ammonium lactate (LAC-HYDRIN) 12 % lotion Apply topically as needed for Dry Skin. 400 mL 3    ergocalciferol (ERGOCALCIFEROL) 50,000 unit Cap Take 1 capsule (50,000 Units total) by mouth once a week. 12 capsule 3    ferrous sulfate (IRON) 325 mg (65 mg iron) Tab tablet Take 1 tablet (325 mg total) by mouth daily with breakfast. 90 tablet 1    olmesartan (BENICAR) 40 MG tablet TAKE 1 TABLET BY MOUTH EVERY DAY 90 tablet 3    amLODIPine (NORVASC) 10 MG tablet Take 1 tablet (10 mg total) by mouth once daily. 90 tablet 3    atorvastatin (LIPITOR) 20 MG tablet Take 1 tablet (20 mg total) by mouth every evening. 90 tablet 3    cloNIDine 0.3 mg/24 hr td ptwk (CATAPRES) 0.3 mg/24 hr Place 1 patch onto the skin every 7 days. 12 patch 3    gabapentin (NEURONTIN) 100 MG capsule Take 1 capsule (100 mg total) by mouth 2 (two) times daily. 60 capsule 0    glimepiride (AMARYL) 4 MG tablet Take 1 tablet (4 mg total) by mouth before breakfast. 90 tablet 3    hydrALAZINE (APRESOLINE) 25 MG tablet Take 1 tablet (25 mg total) by mouth 2 (two) times a day. 60 tablet 0    linaGLIPtin (TRADJENTA) 5 mg Tab tablet Take 1 tablet (5 mg total) by mouth once daily. 90 tablet 3    [DISCONTINUED] gabapentin  "(NEURONTIN) 100 MG capsule Take 1 capsule (100 mg total) by mouth 2 (two) times daily. 60 capsule 5     No facility-administered encounter medications on file as of 10/30/2023.       Review of Systems    Objective:      BP (!) 172/72   Pulse 65   Temp 98.1 °F (36.7 °C)   Ht 5' 8" (1.727 m)   Wt 65.9 kg (145 lb 3.2 oz)   SpO2 97%   BMI 22.08 kg/m²   Physical Exam  Cardiovascular:      Rate and Rhythm: Normal rate and regular rhythm.      Pulses: Normal pulses.      Heart sounds: Normal heart sounds.   Pulmonary:      Effort: Pulmonary effort is normal.      Breath sounds: Normal breath sounds.   Musculoskeletal:      Left hand: Tenderness present. Normal range of motion.      Comments: Left Ist MCP tenderness and along the pollicis longus and pos finkelstein's test   Neurological:      Mental Status: She is alert.         Results for orders placed or performed in visit on 09/23/23   Protein/Creatinine Ratio, Urine   Result Value Ref Range    Protein, Urine Random 13 0 - 15 mg/dL    Creatinine, Urine 77.0 15.0 - 325.0 mg/dL    Prot/Creat Ratio, Urine 0.17 0.00 - 0.20   Urinalysis   Result Value Ref Range    Specimen UA Urine, Clean Catch     Color, UA Yellow Yellow, Straw, Marylin    Appearance, UA Clear Clear    pH, UA 6.0 5.0 - 8.0    Specific Gravity, UA 1.010 1.005 - 1.030    Protein, UA Negative Negative    Glucose, UA Negative Negative    Ketones, UA Negative Negative    Bilirubin (UA) Negative Negative    Occult Blood UA Negative Negative    Nitrite, UA Positive (A) Negative    Leukocytes, UA 1+ (A) Negative   Urinalysis Microscopic   Result Value Ref Range    RBC, UA 5 (H) 0 - 4 /hpf    WBC, UA 20 (H) 0 - 5 /hpf    Bacteria Many (A) None-Occ /hpf    Squam Epithel, UA 3 /hpf    Microscopic Comment SEE COMMENT      Assessment:       1. De Quervain's tenosynovitis, left    2. Essential hypertension    3. DM type 2 with diabetic peripheral neuropathy    4. CKD stage 3 due to type 2 diabetes mellitus    5. " Mixed hyperlipidemia    6. Controlled type 2 diabetes mellitus with stage 3 chronic kidney disease, without long-term current use of insulin        1. De Quervain's tenosynovitis, left  Overview:  New, conservative therapy with topicals, icing and avoid activities that caused the pain. RTC if not improved.      2. Essential hypertension  Overview:  Chronic not controlled, Hydralazine added to her regimen, RTC for reevaluation    Orders:  -     hydrALAZINE (APRESOLINE) 25 MG tablet; Take 1 tablet (25 mg total) by mouth 2 (two) times a day.  Dispense: 60 tablet; Refill: 0  -     Hypertension Digital Medicine (HDMP) Enrollment Order    3. DM type 2 with diabetic peripheral neuropathy  Comments:  restart gabapentin, she did nto have problem with it in the past, cont to monitor renal function. A1c today.  Overview:  restart gabapentin, she did nto have problem with it in the past, cont to monitor renal function. A1c today.    Orders:  -     gabapentin (NEURONTIN) 100 MG capsule; Take 1 capsule (100 mg total) by mouth 2 (two) times daily.  Dispense: 60 capsule; Refill: 0  -     Diabetes Digital Medicine (DDMP) Enrollment Order    4. CKD stage 3 due to type 2 diabetes mellitus  -     Hemoglobin A1C; Future; Expected date: 10/30/2023    5. Mixed hyperlipidemia  -     Lipids Digital Medicine (LDMP) Enrollment Order    6. Controlled type 2 diabetes mellitus with stage 3 chronic kidney disease, without long-term current use of insulin  Overview:  Avoid NSAIDs and use tylenol for pain, ok to use topicals           I have reviewed all of the patient's clinical history available in care everywhere and Epic and have utilized this in my evaluation and management recommendations today.      Treatment options and alternatives were discussed with the patient. Patient was given ample time to ask questions. All questions were answered. Voices understanding and acceptance of this advice. Will call back if any further questions or  concerns.                Antionette Son MD  [facility], [place]

## 2023-10-31 PROBLEM — N18.30 CKD STAGE 3 DUE TO TYPE 2 DIABETES MELLITUS: Status: ACTIVE | Noted: 2023-10-31

## 2023-10-31 PROBLEM — M65.4 DE QUERVAIN'S TENOSYNOVITIS, LEFT: Status: ACTIVE | Noted: 2023-10-31

## 2023-10-31 PROBLEM — I10 ESSENTIAL HYPERTENSION: Status: ACTIVE | Noted: 2023-10-31

## 2023-10-31 PROBLEM — E11.42 DM TYPE 2 WITH DIABETIC PERIPHERAL NEUROPATHY: Status: ACTIVE | Noted: 2023-10-31

## 2023-10-31 PROBLEM — N18.30 CONTROLLED TYPE 2 DIABETES MELLITUS WITH STAGE 3 CHRONIC KIDNEY DISEASE, WITHOUT LONG-TERM CURRENT USE OF INSULIN: Status: ACTIVE | Noted: 2023-10-31

## 2023-10-31 PROBLEM — E11.22 CKD STAGE 3 DUE TO TYPE 2 DIABETES MELLITUS: Status: ACTIVE | Noted: 2023-10-31

## 2023-10-31 PROBLEM — E11.22 CONTROLLED TYPE 2 DIABETES MELLITUS WITH STAGE 3 CHRONIC KIDNEY DISEASE, WITHOUT LONG-TERM CURRENT USE OF INSULIN: Status: ACTIVE | Noted: 2023-10-31

## 2023-11-06 NOTE — PROGRESS NOTES
Subjective:       Patient ID: Darshana Fortune is a 82 y.o. female.    Chief Complaint: Other Misc (Right knee pain/swelling-bilateral leg and feet pain/tingling-med refills)      History of Present Illness:   Darshana Fortune 82 y.o. female presents today with     Brought in by-grand daughter Debbie for routine visit  Lives with- self  Depression-no  Functional assessment-timed up and go-good  MMSE-NA  Sleep-good  Energy level-good  Eating-good  Problem today- right knee arthritis flare, nothing new, good movement but with mild swelling, denies need for further intervention due to transient nature of the symptom in the past, expected to resolve.  Also with peripheral neuropathy due to DM II. Was on gabapentin in the past before switching to Lyrica. With Lyrica started having dec GFR for which she has followed up with Renal, does not need any med. Lyrica has been stopped and GFR is coming up. Neuropathy is worse and beginning to disturb her sleep.    Age spots to BLE, worsening per pt.      Past Medical History:   Diagnosis Date    Cerebral aneurysm without rupture 1998    went to [facility] in [place]    Diabetes mellitus     Hypertension     Malignant neoplasm of left kidney 2017    removed     History reviewed. No pertinent family history.  Social History     Socioeconomic History    Marital status:    Tobacco Use    Smoking status: Never    Smokeless tobacco: Never   Substance and Sexual Activity    Alcohol use: No    Drug use: No     Social Determinants of Health     Financial Resource Strain: High Risk    Difficulty of Paying Living Expenses: Hard   Food Insecurity: No Food Insecurity    Worried About Running Out of Food in the Last Year: Never true    Ran Out of Food in the Last Year: Never true   Transportation Needs: No Transportation Needs    Lack of Transportation (Medical): No    Lack of Transportation (Non-Medical): No   Physical Activity: Sufficiently Active    Days of Exercise per  Status: She is alert and oriented to person, place, and time. Psychiatric:         Behavior: Behavior normal.                  An electronic signature was used to authenticate this note.     --Tan Negron MD Week: 4 days    Minutes of Exercise per Session: 60 min   Stress: No Stress Concern Present    Feeling of Stress : Not at all   Social Connections: Socially Isolated    Frequency of Communication with Friends and Family: More than three times a week    Frequency of Social Gatherings with Friends and Family: More than three times a week    Attends Voodoo Services: Never    Active Member of Clubs or Organizations: No    Attends Club or Organization Meetings: Never    Marital Status:    Housing Stability: Low Risk     Unable to Pay for Housing in the Last Year: No    Number of Places Lived in the Last Year: 1    Unstable Housing in the Last Year: No     Outpatient Encounter Medications as of 6/20/2023   Medication Sig Dispense Refill    [DISCONTINUED] ammonium lactate (LAC-HYDRIN) 12 % lotion APPLY TOPICALLY AS NEEDED FOR DRY SKIN. 400 mL 3    amLODIPine (NORVASC) 10 MG tablet Take 1 tablet (10 mg total) by mouth once daily. 90 tablet 3    ammonium lactate (LAC-HYDRIN) 12 % lotion Apply topically as needed for Dry Skin. 400 mL 3    atorvastatin (LIPITOR) 20 MG tablet Take 1 tablet (20 mg total) by mouth every evening. 90 tablet 3    cloNIDine 0.3 mg/24 hr td ptwk (CATAPRES) 0.3 mg/24 hr Place 1 patch onto the skin every 7 days. 12 patch 3    gabapentin (NEURONTIN) 100 MG capsule Take 1 capsule (100 mg total) by mouth 2 (two) times daily. 60 capsule 5    glimepiride (AMARYL) 4 MG tablet Take 1 tablet (4 mg total) by mouth before breakfast. 90 tablet 3    linaGLIPtin (TRADJENTA) 5 mg Tab tablet Take 1 tablet (5 mg total) by mouth once daily. 90 tablet 3    olmesartan (BENICAR) 40 MG tablet Take 1 tablet (40 mg total) by mouth once daily. 30 tablet 5     No facility-administered encounter medications on file as of 6/20/2023.       Review of Systems    A complete 10 point ROS was completed and are positive as per above HPI.  Otherwise negative for fever, diplopia, chest pain, shortness of breath, vomiting, blood  "in urine, joint pain, skin rash, seizures and unusual bleeding.     Objective:      /60 (BP Location: Left arm, Patient Position: Sitting, BP Method: Medium (Manual))   Pulse 69   Temp 98.5 °F (36.9 °C) (Oral)   Ht 5' 7" (1.702 m)   Wt 67.4 kg (148 lb 9.6 oz)   SpO2 98%   BMI 23.27 kg/m²   Physical Exam  Cardiovascular:      Pulses: Normal pulses.      Heart sounds: Normal heart sounds.   Pulmonary:      Effort: Pulmonary effort is normal.      Breath sounds: Normal breath sounds.   Musculoskeletal:      Right knee: Effusion present. Normal range of motion. Tenderness present over the lateral joint line.        Legs:    Skin:     Findings: Rash (age spots diffuse to BLE) present.       CONSTITUTIONAL: No apparent distress. Appears comfortable.   CARDIOVASCULAR: No perioral cyanosis  PULMONARY: Breathing unlabored. No retractions Chest expansion grossly normal.  PSYCHIATRIC: Alert and conversant and grossly oriented. Mood is grossly neutral. Affect appropriate.  NEUROLOGIC: No focal sensory deficits reported.   Results for orders placed or performed in visit on 03/01/23    DIABETES EYE EXAM   Result Value Ref Range    Left Eye DM Retinopathy Positive     Right Eye DM Retinopathy Positive      Lab Results   Component Value Date    HGBA1C 8.1 (H) 02/27/2023       Assessment:       1. DM type 2 with diabetic peripheral neuropathy    2. Xerosis of skin    3. Hypertension associated with stage 3 chronic kidney disease due to type 2 diabetes mellitus    4. Other secondary osteoarthritis of right knee        Plan:   DM type 2 with diabetic peripheral neuropathy  Comments:  restart gabapentin, she did nto have problem with it in the past, cont to monitor renal function. A1c today.  Orders:  -     Hemoglobin A1C; Future; Expected date: 06/20/2023  -     gabapentin (NEURONTIN) 100 MG capsule; Take 1 capsule (100 mg total) by mouth 2 (two) times daily.  Dispense: 60 capsule; Refill: 5    Xerosis of " skin  Comments:  legs, see med  Orders:  -     ammonium lactate (LAC-HYDRIN) 12 % lotion; Apply topically as needed for Dry Skin.  Dispense: 400 mL; Refill: 3    Hypertension associated with stage 3 chronic kidney disease due to type 2 diabetes mellitus  Comments:  controlled    Other secondary osteoarthritis of right knee  Comments:  exacerbation, will resolve, cont home rememdy and gabapentiin will help.    I have reviewed all of the patient's clinical history available in care everywhere and Epic and have utilized this in my evaluation and management recommendations today.     Treatment options and alternatives were discussed with the patient. Patient was given ample time to ask questions. All questions were answered. Voices understanding and acceptance of this advice. Will call back if any further questions or concerns.    Antionette Son MD

## 2023-11-17 ENCOUNTER — PATIENT MESSAGE (OUTPATIENT)
Dept: PRIMARY CARE CLINIC | Facility: CLINIC | Age: 83
End: 2023-11-17
Payer: MEDICAID

## 2023-11-22 DIAGNOSIS — I10 ESSENTIAL HYPERTENSION: ICD-10-CM

## 2023-11-22 RX ORDER — HYDRALAZINE HYDROCHLORIDE 25 MG/1
25 TABLET, FILM COATED ORAL 2 TIMES DAILY
Qty: 180 TABLET | Refills: 1 | Status: SHIPPED | OUTPATIENT
Start: 2023-11-22 | End: 2024-02-07 | Stop reason: SDUPTHER

## 2023-11-22 NOTE — TELEPHONE ENCOUNTER
Care Due:                  Date            Visit Type   Department     Provider  --------------------------------------------------------------------------------                                ESTABLISHED   [facility] PRIMARY  Last Visit: 10-      PATIENT      CARE           Antionette Son                              ESTABLISHED   [facility] PRIMARY  Next Visit: 01-      PATIENT      CARE           Antionette Son                                                            Last  Test          Frequency    Reason                     Performed    Due Date  --------------------------------------------------------------------------------    HBA1C.......  6 months...  glimepiride, linaGLIPtin.  06- 12-    [facility] Embedded Care Due Messages. Reference number: [id number].   11/22/2023 12:34:24 PM CST

## 2024-01-03 ENCOUNTER — PATIENT MESSAGE (OUTPATIENT)
Dept: PRIMARY CARE CLINIC | Facility: CLINIC | Age: 84
End: 2024-01-03
Payer: MEDICARE

## 2024-01-05 ENCOUNTER — PATIENT OUTREACH (OUTPATIENT)
Dept: ADMINISTRATIVE | Facility: HOSPITAL | Age: 84
End: 2024-01-05
Payer: MEDICARE

## 2024-01-05 DIAGNOSIS — N18.30 CONTROLLED TYPE 2 DIABETES MELLITUS WITH STAGE 3 CHRONIC KIDNEY DISEASE, WITHOUT LONG-TERM CURRENT USE OF INSULIN: Primary | ICD-10-CM

## 2024-01-05 DIAGNOSIS — E11.22 CONTROLLED TYPE 2 DIABETES MELLITUS WITH STAGE 3 CHRONIC KIDNEY DISEASE, WITHOUT LONG-TERM CURRENT USE OF INSULIN: Primary | ICD-10-CM

## 2024-02-05 ENCOUNTER — PATIENT OUTREACH (OUTPATIENT)
Dept: ADMINISTRATIVE | Facility: HOSPITAL | Age: 84
End: 2024-02-05
Payer: MEDICARE

## 2024-02-05 NOTE — LETTER
AUTHORIZATION FOR RELEASE OF   CONFIDENTIAL INFORMATION    Dear Medical records,    We are seeing Darshana Fortune, date of birth 1940, in the clinic at [facility]. Antionette Son MD is the patient's PCP. Darshana Fortune has an outstanding lab/procedure at the time we reviewed her chart. In order to help keep her health information updated, she has authorized us to request the following medical record(s):        (  )  MAMMOGRAM                                      (  )  COLONOSCOPY      (  )  PAP SMEAR                                          (  )  OUTSIDE LAB RESULTS     (  )  DEXA SCAN                                          ( x )  EYE EXAM            (  )  FOOT EXAM                                          (  x)  ENTIRE RECORD     (  )  OUTSIDE IMMUNIZATIONS                 (  )  _______________         Please fax records to Antionette Son MD, [phone]     If you have any questions, please contact [facility]   at [phone].           Patient Name: Darshana Fortune  : 1940  Patient Phone #: [phone]

## 2024-02-05 NOTE — PROGRESS NOTES
Health Maintenance Due   Topic Date Due    RSV Vaccine (Age 60+ and Pregnant patients) (1 - 1-dose 60+ series) Never done    Shingles Vaccine (2 of 2) 04/24/2023    Influenza Vaccine (1) 09/01/2023    COVID-19 Vaccine (4 - 2023-24 season) 09/01/2023    Diabetes Urine Screening  11/01/2023    Hemoglobin A1c  12/20/2023    Eye Exam  02/13/2024    Patient has up coming appointment , outreached the patient no answer to recieve Remote B/P , Fax number for eye clinic [place] [phone] outreached to get records

## 2024-02-07 ENCOUNTER — LAB VISIT (OUTPATIENT)
Dept: LAB | Facility: HOSPITAL | Age: 84
End: 2024-02-07
Attending: FAMILY MEDICINE
Payer: MEDICARE

## 2024-02-07 ENCOUNTER — OFFICE VISIT (OUTPATIENT)
Dept: PRIMARY CARE CLINIC | Facility: CLINIC | Age: 84
End: 2024-02-07
Payer: MEDICARE

## 2024-02-07 VITALS
HEIGHT: 68 IN | DIASTOLIC BLOOD PRESSURE: 58 MMHG | BODY MASS INDEX: 21.82 KG/M2 | OXYGEN SATURATION: 97 % | TEMPERATURE: 98 F | WEIGHT: 144 LBS | SYSTOLIC BLOOD PRESSURE: 132 MMHG | HEART RATE: 73 BPM

## 2024-02-07 DIAGNOSIS — N18.32 STAGE 3B CHRONIC KIDNEY DISEASE: ICD-10-CM

## 2024-02-07 DIAGNOSIS — E11.42 DM TYPE 2 WITH DIABETIC PERIPHERAL NEUROPATHY: Chronic | ICD-10-CM

## 2024-02-07 DIAGNOSIS — I10 PRIMARY HYPERTENSION: ICD-10-CM

## 2024-02-07 DIAGNOSIS — E78.2 MIXED HYPERLIPIDEMIA: Chronic | ICD-10-CM

## 2024-02-07 DIAGNOSIS — E11.22 CONTROLLED TYPE 2 DIABETES MELLITUS WITH STAGE 3 CHRONIC KIDNEY DISEASE, WITHOUT LONG-TERM CURRENT USE OF INSULIN: ICD-10-CM

## 2024-02-07 DIAGNOSIS — I15.2 HYPERTENSION ASSOCIATED WITH TYPE 2 DIABETES MELLITUS: Chronic | ICD-10-CM

## 2024-02-07 DIAGNOSIS — E11.42 DM TYPE 2 WITH DIABETIC PERIPHERAL NEUROPATHY: ICD-10-CM

## 2024-02-07 DIAGNOSIS — E55.9 VITAMIN D DEFICIENCY: Chronic | ICD-10-CM

## 2024-02-07 DIAGNOSIS — C64.2 MALIGNANT NEOPLASM OF LEFT KIDNEY, EXCEPT RENAL PELVIS: ICD-10-CM

## 2024-02-07 DIAGNOSIS — N18.30 CONTROLLED TYPE 2 DIABETES MELLITUS WITH STAGE 3 CHRONIC KIDNEY DISEASE, WITHOUT LONG-TERM CURRENT USE OF INSULIN: ICD-10-CM

## 2024-02-07 DIAGNOSIS — L85.3 XEROSIS OF SKIN: Chronic | ICD-10-CM

## 2024-02-07 DIAGNOSIS — I10 ESSENTIAL HYPERTENSION: Primary | ICD-10-CM

## 2024-02-07 DIAGNOSIS — I10 ESSENTIAL HYPERTENSION: ICD-10-CM

## 2024-02-07 DIAGNOSIS — D50.8 OTHER IRON DEFICIENCY ANEMIA: Chronic | ICD-10-CM

## 2024-02-07 DIAGNOSIS — F51.01 PRIMARY INSOMNIA: Chronic | ICD-10-CM

## 2024-02-07 DIAGNOSIS — E11.69 CONTROLLED TYPE 2 DIABETES MELLITUS WITH OTHER SPECIFIED COMPLICATION, WITHOUT LONG-TERM CURRENT USE OF INSULIN: Chronic | ICD-10-CM

## 2024-02-07 DIAGNOSIS — E11.59 HYPERTENSION ASSOCIATED WITH TYPE 2 DIABETES MELLITUS: Chronic | ICD-10-CM

## 2024-02-07 LAB
ALBUMIN SERPL BCP-MCNC: 4 G/DL (ref 3.5–5.2)
ALBUMIN SERPL BCP-MCNC: 4 G/DL (ref 3.5–5.2)
ALP SERPL-CCNC: 63 U/L (ref 55–135)
ALT SERPL W/O P-5'-P-CCNC: 25 U/L (ref 10–44)
ANION GAP SERPL CALC-SCNC: 12 MMOL/L (ref 8–16)
AST SERPL-CCNC: 26 U/L (ref 10–40)
BILIRUB DIRECT SERPL-MCNC: 0.2 MG/DL (ref 0.1–0.3)
BILIRUB SERPL-MCNC: 0.4 MG/DL (ref 0.1–1)
BUN SERPL-MCNC: 32 MG/DL (ref 8–23)
CALCIUM SERPL-MCNC: 10.1 MG/DL (ref 8.7–10.5)
CHLORIDE SERPL-SCNC: 107 MMOL/L (ref 95–110)
CHOLEST SERPL-MCNC: 150 MG/DL (ref 120–199)
CHOLEST/HDLC SERPL: 2.5 {RATIO} (ref 2–5)
CO2 SERPL-SCNC: 20 MMOL/L (ref 23–29)
CREAT SERPL-MCNC: 1.2 MG/DL (ref 0.5–1.4)
EST. GFR  (NO RACE VARIABLE): 44.6 ML/MIN/1.73 M^2
GLUCOSE SERPL-MCNC: 126 MG/DL (ref 70–110)
HDLC SERPL-MCNC: 60 MG/DL (ref 40–75)
HDLC SERPL: 40 % (ref 20–50)
LDLC SERPL CALC-MCNC: 80.2 MG/DL (ref 63–159)
NONHDLC SERPL-MCNC: 90 MG/DL
PHOSPHATE SERPL-MCNC: 3.8 MG/DL (ref 2.7–4.5)
POTASSIUM SERPL-SCNC: 4.7 MMOL/L (ref 3.5–5.1)
PROT SERPL-MCNC: 8.2 G/DL (ref 6–8.4)
SODIUM SERPL-SCNC: 139 MMOL/L (ref 136–145)
TRIGL SERPL-MCNC: 49 MG/DL (ref 30–150)

## 2024-02-07 PROCEDURE — 83970 ASSAY OF PARATHORMONE: CPT | Performed by: INTERNAL MEDICINE

## 2024-02-07 PROCEDURE — 99999 PR PBB SHADOW E&M-EST. PATIENT-LVL III: CPT | Mod: PBBFAC,,, | Performed by: FAMILY MEDICINE

## 2024-02-07 PROCEDURE — 80069 RENAL FUNCTION PANEL: CPT | Performed by: INTERNAL MEDICINE

## 2024-02-07 PROCEDURE — 80076 HEPATIC FUNCTION PANEL: CPT | Mod: 59 | Performed by: FAMILY MEDICINE

## 2024-02-07 PROCEDURE — 82306 VITAMIN D 25 HYDROXY: CPT | Performed by: INTERNAL MEDICINE

## 2024-02-07 PROCEDURE — 83036 HEMOGLOBIN GLYCOSYLATED A1C: CPT | Performed by: FAMILY MEDICINE

## 2024-02-07 PROCEDURE — 36415 COLL VENOUS BLD VENIPUNCTURE: CPT | Mod: PN | Performed by: INTERNAL MEDICINE

## 2024-02-07 PROCEDURE — 99214 OFFICE O/P EST MOD 30 MIN: CPT | Mod: S$GLB,,, | Performed by: FAMILY MEDICINE

## 2024-02-07 PROCEDURE — 80061 LIPID PANEL: CPT | Performed by: FAMILY MEDICINE

## 2024-02-07 RX ORDER — ATORVASTATIN CALCIUM 20 MG/1
20 TABLET, FILM COATED ORAL NIGHTLY
Qty: 90 TABLET | Refills: 3 | Status: SHIPPED | OUTPATIENT
Start: 2024-02-07 | End: 2025-02-06

## 2024-02-07 RX ORDER — OLMESARTAN MEDOXOMIL 40 MG/1
40 TABLET ORAL DAILY
Qty: 90 TABLET | Refills: 3 | Status: SHIPPED | OUTPATIENT
Start: 2024-02-07 | End: 2025-02-06

## 2024-02-07 RX ORDER — AMLODIPINE BESYLATE 10 MG/1
10 TABLET ORAL DAILY
Qty: 90 TABLET | Refills: 3 | Status: SHIPPED | OUTPATIENT
Start: 2024-02-07 | End: 2025-02-06

## 2024-02-07 RX ORDER — ERGOCALCIFEROL 1.25 MG/1
50000 CAPSULE ORAL WEEKLY
Qty: 12 CAPSULE | Refills: 3 | Status: SHIPPED | OUTPATIENT
Start: 2024-02-07 | End: 2025-02-06

## 2024-02-07 RX ORDER — HYDRALAZINE HYDROCHLORIDE 25 MG/1
25 TABLET, FILM COATED ORAL 2 TIMES DAILY
Qty: 180 TABLET | Refills: 3 | Status: SHIPPED | OUTPATIENT
Start: 2024-02-07 | End: 2025-02-06

## 2024-02-07 RX ORDER — LINAGLIPTIN 5 MG/1
5 TABLET, FILM COATED ORAL DAILY
Qty: 90 TABLET | Refills: 3 | Status: SHIPPED | OUTPATIENT
Start: 2024-02-07 | End: 2025-02-06

## 2024-02-07 RX ORDER — CLONIDINE 0.3 MG/24H
1 PATCH, EXTENDED RELEASE TRANSDERMAL
Qty: 12 PATCH | Refills: 3 | Status: SHIPPED | OUTPATIENT
Start: 2024-02-07 | End: 2025-02-06

## 2024-02-07 RX ORDER — FERROUS SULFATE 325(65) MG
325 TABLET ORAL
Qty: 90 TABLET | Refills: 3 | Status: SHIPPED | OUTPATIENT
Start: 2024-02-07 | End: 2025-02-06

## 2024-02-07 RX ORDER — GLIMEPIRIDE 4 MG/1
4 TABLET ORAL
Qty: 90 TABLET | Refills: 3 | Status: SHIPPED | OUTPATIENT
Start: 2024-02-07 | End: 2025-02-06

## 2024-02-07 RX ORDER — AMMONIUM LACTATE 12 G/100G
LOTION TOPICAL
Qty: 400 ML | Refills: 3 | Status: SHIPPED | OUTPATIENT
Start: 2024-02-07

## 2024-02-07 RX ORDER — GABAPENTIN 100 MG/1
100 CAPSULE ORAL 2 TIMES DAILY
Qty: 60 CAPSULE | Refills: 5 | Status: SHIPPED | OUTPATIENT
Start: 2024-02-07 | End: 2024-08-05

## 2024-02-07 NOTE — PROGRESS NOTES
Subjective:      Patient ID: Darshana Fortune is a 83 y.o. female.    Chief Complaint: Hypertension      History of Present Illness:    Darshana Fortune is a 83 y.o. female that presents with her grand-daughter for follow-up and med refill.  She has a history of chronic HTN, BP controlled today. Her current medication regimen includes Hydralazine 25 mg BID, Clonidine patch 1 per week, Olmesartan 40 mg QD, and Amlodipine 10 mg QD.  Her HLP medication regimen includes Atorvastatin 20 mg.  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 She wears a soft thump spica on the left hand as needed.   She has a history DM type 2 with diabetic peripheral neuropathy that she takes gabapentin 100 mg BID prn nerve pain. She complains of an increase in leg pain at night that wakes her from her sleep.  She states it feels like something is crawling on her.  She continues to follow-up with Nephrologist, Dr. Dias, for CKD stage 3.      ROS:  Review of Systems   Constitutional:  Negative for chills, fatigue and fever.   HENT:  Negative for congestion, ear pain and facial swelling.    Eyes:  Negative for pain, redness and visual disturbance.   Respiratory:  Negative for chest tightness and shortness of breath.    Cardiovascular:  Negative for chest pain.   Gastrointestinal:  Negative for abdominal pain, nausea and vomiting.   Endocrine: Negative.    Genitourinary: Negative.    Musculoskeletal: Negative.    Skin: Negative.    Allergic/Immunologic: Negative.    Neurological:  Negative for dizziness, facial asymmetry, light-headedness and headaches.   Psychiatric/Behavioral:  Negative for agitation and behavioral problems.        Past Medical History:   Diagnosis Date    Cerebral aneurysm without rupture 1998    went to [facility] in [place]    Diabetes mellitus     Hypertension     Malignant  neoplasm of left kidney 2017    removed       Objective:     Medications:  Current Outpatient Medications   Medication Sig Dispense Refill    ammonium lactate (LAC-HYDRIN) 12 % lotion Apply topically as needed for Dry Skin. 400 mL 3    atorvastatin (LIPITOR) 20 MG tablet Take 1 tablet (20 mg total) by mouth every evening. 90 tablet 3    cloNIDine 0.3 mg/24 hr td ptwk (CATAPRES) 0.3 mg/24 hr Place 1 patch onto the skin every 7 days. 12 patch 3    ergocalciferol (ERGOCALCIFEROL) 50,000 unit Cap Take 1 capsule (50,000 Units total) by mouth once a week. 12 capsule 3    ferrous sulfate (IRON) 325 mg (65 mg iron) Tab tablet Take 1 tablet (325 mg total) by mouth daily with breakfast. 90 tablet 1    gabapentin (NEURONTIN) 100 MG capsule Take 1 capsule (100 mg total) by mouth 2 (two) times daily. 60 capsule 0    glimepiride (AMARYL) 4 MG tablet Take 1 tablet (4 mg total) by mouth before breakfast. 90 tablet 3    hydrALAZINE (APRESOLINE) 25 MG tablet TAKE 1 TABLET BY MOUTH TWICE A  tablet 1    olmesartan (BENICAR) 40 MG tablet TAKE 1 TABLET BY MOUTH EVERY DAY 90 tablet 3    amLODIPine (NORVASC) 10 MG tablet Take 1 tablet (10 mg total) by mouth once daily. 90 tablet 3    linaGLIPtin (TRADJENTA) 5 mg Tab tablet Take 1 tablet (5 mg total) by mouth once daily. 90 tablet 3     No current facility-administered medications for this visit.       Social History:  Social History     Socioeconomic History    Marital status:    Tobacco Use    Smoking status: Never    Smokeless tobacco: Never   Substance and Sexual Activity    Alcohol use: No    Drug use: No     Social Determinants of Health     Financial Resource Strain: High Risk (2/27/2023)    Overall Financial Resource Strain (CARDIA)     Difficulty of Paying Living Expenses: Hard   Food Insecurity: No Food Insecurity (2/27/2023)    Hunger Vital Sign     Worried About Running Out of Food in the Last Year: Never true     Ran Out of Food in the Last Year: Never true  "  Transportation Needs: No Transportation Needs (2/27/2023)    PRAPARE - Transportation     Lack of Transportation (Medical): No     Lack of Transportation (Non-Medical): No   Physical Activity: Sufficiently Active (2/27/2023)    Exercise Vital Sign     Days of Exercise per Week: 4 days     Minutes of Exercise per Session: 60 min   Stress: No Stress Concern Present (2/27/2023)    [employer] [place] [employer] - Occupational Stress Questionnaire     Feeling of Stress : Not at all   Social Connections: Socially Isolated (2/27/2023)    Social Connection and Isolation Panel [NHANES]     Frequency of Communication with Friends and Family: More than three times a week     Frequency of Social Gatherings with Friends and Family: More than three times a week     Attends Anabaptism Services: Never     Active Member of Clubs or Organizations: No     Attends Club or Organization Meetings: Never     Marital Status:    Housing Stability: Low Risk  (2/27/2023)    Housing Stability Vital Sign     Unable to Pay for Housing in the Last Year: No     Number of Places Lived in the Last Year: 1     Unstable Housing in the Last Year: No       Family History:   family history is not on file.    Health Maintenance   Topic Date Due    Shingles Vaccine (2 of 2) 04/24/2023    Hemoglobin A1c  12/20/2023    Eye Exam  02/13/2024    Lipid Panel  08/28/2024    DEXA Scan  07/05/2025    TETANUS VACCINE  03/03/2030       Physical Exam:    Vital Signs  Temp: 98.2 °F (36.8 °C)  Pulse: 73  SpO2: 97 %  BP: (!) 132/58  Height and Weight  Height: 5' 8" (172.7 cm)  Weight: 65.3 kg (144 lb)  BSA (Calculated - sq m): 1.77 sq meters  BMI (Calculated): 21.9  Weight in (lb) to have BMI = 25: 164.1]    Body mass index is 21.9 kg/m².    Physical Exam  Constitutional:       Appearance: Normal appearance.   HENT:      Head: Normocephalic and atraumatic.      Nose: Nose normal.   Eyes:      Extraocular Movements: Extraocular movements intact.      " Conjunctiva/sclera: Conjunctivae normal.      Pupils: Pupils are equal, round, and reactive to light.   Cardiovascular:      Pulses: Normal pulses.      Heart sounds: Normal heart sounds.   Pulmonary:      Effort: Pulmonary effort is normal.      Breath sounds: Normal breath sounds.   Abdominal:      General: Bowel sounds are normal.   Musculoskeletal:         General: Normal range of motion.      Cervical back: Normal range of motion.   Neurological:      General: No focal deficit present.      Mental Status: She is alert and oriented to person, place, and time.   Psychiatric:         Mood and Affect: Mood normal.         Behavior: Behavior normal.     Lab Results   Component Value Date    CHOL 135 08/28/2023    CHOL 202 (H) 07/15/2022    CHOL 176 12/01/2021    TRIG 58 08/28/2023    TRIG 61 07/15/2022    TRIG 51 12/01/2021    HDL 45 08/28/2023    HDL 63 07/15/2022    HDL 69 12/01/2021    TOTALCHOLEST 3.0 08/28/2023    TOTALCHOLEST 3.2 07/15/2022    TOTALCHOLEST 2.6 12/01/2021    NONHDLCHOL 90 08/28/2023    NONHDLCHOL 139 07/15/2022    NONHDLCHOL 107 12/01/2021       Lab Results   Component Value Date    HGBA1C 7.8 (H) 06/20/2023       Assessment:      ICD-10-CM ICD-9-CM   1. Essential hypertension  I10 401.9   2. DM type 2 with diabetic peripheral neuropathy  E11.42 250.60     357.2   3. Stage 3b chronic kidney disease  N18.32 585.3   4. Malignant neoplasm of left kidney, except renal pelvis  C64.2 189.0   5. Controlled type 2 diabetes mellitus with other specified complication, without long-term current use of insulin  E11.69 250.80   6. DM type 2 with diabetic peripheral neuropathy  E11.42 250.60     357.2         Plan:  Essential hypertension  -     Lipid Panel; Future; Expected date: 02/07/2024  -     Hepatic Function Panel; Future; Expected date: 02/07/2024    DM type 2 with diabetic peripheral neuropathy    Stage 3b chronic kidney disease    Malignant neoplasm of left kidney, except renal  pelvis  Comments:  history of    Controlled type 2 diabetes mellitus with other specified complication, without long-term current use of insulin    DM type 2 with diabetic peripheral neuropathy  Comments:  restart gabapentin, she did nto have problem with it in the past, cont to monitor renal function. A1c today.       Orders Placed This Encounter   Procedures    Lipid Panel    Hepatic Function Panel       There are no discontinued medications.    No follow-ups on file.        JENNIFER Read

## 2024-02-07 NOTE — PATIENT INSTRUCTIONS
"Maintain/Continue a heathy lifestyle.  Choose a diet rich in fruits, vegetables, and low-fat dairy products, but low in meats, sweets, and refined grains   Be more active. If you are able, walk for 35 mins 5 times a week.    Diabetes and Diet   The Basics   Written by the doctors and editors at [facility]   Why is diet important in diabetes? -- Diet is important because it is part of diabetes treatment. Many people need to change what they eat and how much they eat to help treat their diabetes. It is important for people to treat their diabetes so that they:  Keep their blood sugar at or near a normal level  Prevent long-term problems, such as heart or kidney problems, that can happen in people with diabetes  Changing your diet can also help treat obesity, high blood pressure, and high cholesterol. These conditions can affect people with diabetes and can lead to future problems, such as heart attacks or strokes.  Who will work with me to change my diet? -- Your doctor or nurse will work with you to make a food plan to change your diet. They might also recommend that you work with a "dietitian." A dietitian is an expert on food and eating.  Do I need to eat at the same times every day? -- When and how often you should eat depends, in part, on the diabetes medicines you take. For example:  People who take about the same amount of insulin at the same time each day (called a "fixed regimen") should eat meals at the same times. This is also true for people who take pills that increase insulin levels, such as sulfonylureas. Eating meals at the same time every day helps prevent low blood sugar.  People who adjust the dose and timing of their insulin each day (called a "flexible regimen") do not always have to eat meals at the same time. That's because they can time their insulin dose for before they plan to eat, and also adjust the dose for how much they plan to eat.  People who take medicines that don't usually cause low " "blood sugar, such as metformin, don't have to eat meals at the same time every day.  What do I need to think about when planning what to eat? -- Our bodies break down the food we eat into small pieces called carbohydrates, proteins, and fats.  When planning what to eat, people with diabetes need to think about:  Carbohydrates (or "carbs") - Carbohydrates, which are sugars that our bodies use for energy, can raise a person's blood sugar level. Your doctor, nurse, or dietitian will tell you how many carbohydrates you should eat at each meal or snack. Foods that have carbohydrates include:  Bread, pasta, and rice  Vegetables and fruits  Dairy foods  Foods and drinks with added sugar  It is best to get your carbohydrates from fruits, vegetables, whole grains, and low-fat milk. It is best to avoid drinks with added sugar, like soda, juices, and sports drinks.   Protein - Your doctor, nurse, or dietitian will tell you how much protein you should eat each day. It is best to eat lean meats, fish, eggs, beans, peas, soy products, nuts, and seeds.  Fats - The type of fat you eat is more important than the amount of fat. "Saturated" and "trans" fats can increase your risk for heart problems, like a heart attack.  Foods that have saturated fats include meat, butter, cheese, and ice cream.  Foods that have trans fats include processed food with "partially hydrogenated oils" on the ingredient list. This may include fried foods, store bought cookies, muffins, pies, and cakes.  "Monounsaturated" and "polyunsaturated" fats are better for you. Foods with these types of fat include fish, avocado, olive oil, and nuts.  Calories - People need to eat a certain amount of calories each day to keep their weight the same. People who are overweight and want to lose weight need to eat fewer calories each day.  Fiber - Eating foods with a lot of fiber can help control a person's blood sugar level. Foods that have a lot of fiber include apples, " green beans, peas, beans, lentils, nuts, oatmeal, and whole grains.  Salt - People who have high blood pressure should not eat foods that contain a lot of salt (also called sodium). People with high blood pressure should also eat healthy foods, such as fruits, vegetables, and low-fat dairy foods.  Alcohol - Having more than 1 drink (for women) or 2 drinks (for men) a day can raise blood sugar levels. Also, drinks that have fruit juice or soda in them can raise blood sugar levels.  What can I do if I need to lose weight? -- If you need to lose weight, you can:  Exercise - Try to get at least 30 minutes of physical activity a day, most days of the week. Even gentle exercise, like walking, is good for your health. Some people with diabetes need to change their medicine dose before they exercise. They might also need to check their blood sugar levels before and after exercising.  Eat fewer calories - Your doctor, nurse, or dietitian can tell you how many calories you should eat each day in order to lose weight.  If you are worried about your weight, size, or shape, talk with your doctor, nurse, or dietitian. They can help you make changes to improve your health.  Can I eat the same foods as my family? -- Yes. You do not need to eat special foods if you have diabetes. You and your family can eat the same foods. Changing your diet is mostly about eating healthy foods and not eating too much.  What are the other parts of diabetes treatment? -- Besides changing your diet, the other parts of diabetes treatment are:  Exercise  Medicines  Some people with diabetes need to learn how to match their diet and exercise with their medicine dose. For example, people who use insulin might need to choose the dose of insulin they give themselves. To choose their dose, they need to think about:  What they plan to eat at the next meal  How much exercise they plan to do  What their blood sugar level is  If the diet and exercise do not  match the medicine dose, a person's blood sugar level can get too low or too high. Blood sugar levels that are too low or too high can cause problems.  All topics are updated as new evidence becomes available and our peer review process is complete.  This topic retrieved from [employer] on: Sep 21, 2021.  Topic 01250 Version 7.0  Release: 29.4.2 - C29.263  © 2021 UpToDate, Inc. and/or its affiliates. All rights reserved.  Consumer Information Use and Disclaimer   This information is not specific medical advice and does not replace information you receive from your health care provider. This is only a brief summary of general information. It does NOT include all information about conditions, illnesses, injuries, tests, procedures, treatments, therapies, discharge instructions or life-style choices that may apply to you. You must talk with your health care provider for complete information about your health and treatment options. This information should not be used to decide whether or not to accept your health care provider's advice, instructions or recommendations. Only your health care provider has the knowledge and training to provide advice that is right for you. The use of this information is governed by the [employer] End User License Agreement, available at https://www.[email or web address].Typesafe/en/solutions/[employer]/about/mundo.The use of [employer] content is governed by the [employer] Terms of Use. ©2021 UpToDate, Inc. All rights reserved.  Copyright

## 2024-02-07 NOTE — PROGRESS NOTES
Subjective:       Patient ID: Darshana Fortune is a 83 y.o. female.    Chief Complaint: Hypertension and Diabetes II and HLD      History of Present Illness:   Darshana Fortune 83 y.o. female presents today with Hypertension    Here with her grand daughter  See PA Student's note for details.  Past Medical History:   Diagnosis Date    Cerebral aneurysm without rupture 1998    went to [facility] in [place]    Diabetes mellitus     Hypertension     Malignant neoplasm of left kidney 2017    removed     No family history on file.  Social History     Socioeconomic History    Marital status:    Tobacco Use    Smoking status: Never    Smokeless tobacco: Never   Substance and Sexual Activity    Alcohol use: No    Drug use: No     Social Determinants of Health     Financial Resource Strain: High Risk (2/27/2023)    Overall Financial Resource Strain (CARDIA)     Difficulty of Paying Living Expenses: Hard   Food Insecurity: No Food Insecurity (2/27/2023)    Hunger Vital Sign     Worried About Running Out of Food in the Last Year: Never true     Ran Out of Food in the Last Year: Never true   Transportation Needs: No Transportation Needs (2/27/2023)    PRAPARE - Transportation     Lack of Transportation (Medical): No     Lack of Transportation (Non-Medical): No   Physical Activity: Sufficiently Active (2/27/2023)    Exercise Vital Sign     Days of Exercise per Week: 4 days     Minutes of Exercise per Session: 60 min   Stress: No Stress Concern Present (2/27/2023)    [employer] [place] [employer] - Occupational Stress Questionnaire     Feeling of Stress : Not at all   Social Connections: Socially Isolated (2/27/2023)    Social Connection and Isolation Panel [NHANES]     Frequency of Communication with Friends and Family: More than three times a week     Frequency of Social Gatherings with Friends and Family: More than three times a week     Attends Baptism Services: Never     Active Member of Clubs or  Organizations: No     Attends Club or Organization Meetings: Never     Marital Status:    Housing Stability: Low Risk  (2/27/2023)    Housing Stability Vital Sign     Unable to Pay for Housing in the Last Year: No     Number of Places Lived in the Last Year: 1     Unstable Housing in the Last Year: No     Outpatient Encounter Medications as of 2/7/2024   Medication Sig Dispense Refill    [DISCONTINUED] ammonium lactate (LAC-HYDRIN) 12 % lotion Apply topically as needed for Dry Skin. 400 mL 3    [DISCONTINUED] atorvastatin (LIPITOR) 20 MG tablet Take 1 tablet (20 mg total) by mouth every evening. 90 tablet 3    [DISCONTINUED] cloNIDine 0.3 mg/24 hr td ptwk (CATAPRES) 0.3 mg/24 hr Place 1 patch onto the skin every 7 days. 12 patch 3    [DISCONTINUED] ergocalciferol (ERGOCALCIFEROL) 50,000 unit Cap Take 1 capsule (50,000 Units total) by mouth once a week. 12 capsule 3    [DISCONTINUED] ferrous sulfate (IRON) 325 mg (65 mg iron) Tab tablet Take 1 tablet (325 mg total) by mouth daily with breakfast. 90 tablet 1    [DISCONTINUED] gabapentin (NEURONTIN) 100 MG capsule Take 1 capsule (100 mg total) by mouth 2 (two) times daily. 60 capsule 0    [DISCONTINUED] glimepiride (AMARYL) 4 MG tablet Take 1 tablet (4 mg total) by mouth before breakfast. 90 tablet 3    [DISCONTINUED] hydrALAZINE (APRESOLINE) 25 MG tablet TAKE 1 TABLET BY MOUTH TWICE A  tablet 1    [DISCONTINUED] olmesartan (BENICAR) 40 MG tablet TAKE 1 TABLET BY MOUTH EVERY DAY 90 tablet 3    amLODIPine (NORVASC) 10 MG tablet Take 1 tablet (10 mg total) by mouth once daily. 90 tablet 3    ammonium lactate (LAC-HYDRIN) 12 % lotion Apply topically as needed for Dry Skin. 400 mL 3    atorvastatin (LIPITOR) 20 MG tablet Take 1 tablet (20 mg total) by mouth every evening. 90 tablet 3    cloNIDine 0.3 mg/24 hr td ptwk (CATAPRES) 0.3 mg/24 hr Place 1 patch onto the skin every 7 days. 12 patch 3    ergocalciferol (ERGOCALCIFEROL) 50,000 unit Cap Take 1 capsule  "(50,000 Units total) by mouth once a week. 12 capsule 3    ferrous sulfate (IRON) 325 mg (65 mg iron) Tab tablet Take 1 tablet (325 mg total) by mouth daily with breakfast. 90 tablet 3    gabapentin (NEURONTIN) 100 MG capsule Take 1 capsule (100 mg total) by mouth 2 (two) times daily. 60 capsule 5    glimepiride (AMARYL) 4 MG tablet Take 1 tablet (4 mg total) by mouth before breakfast. 90 tablet 3    hydrALAZINE (APRESOLINE) 25 MG tablet Take 1 tablet (25 mg total) by mouth 2 (two) times daily. 180 tablet 3    linaGLIPtin (TRADJENTA) 5 mg Tab tablet Take 1 tablet (5 mg total) by mouth once daily. 90 tablet 3    olmesartan (BENICAR) 40 MG tablet Take 1 tablet (40 mg total) by mouth once daily. 90 tablet 3    [DISCONTINUED] amLODIPine (NORVASC) 10 MG tablet Take 1 tablet (10 mg total) by mouth once daily. 90 tablet 3    [DISCONTINUED] linaGLIPtin (TRADJENTA) 5 mg Tab tablet Take 1 tablet (5 mg total) by mouth once daily. 90 tablet 3     No facility-administered encounter medications on file as of 2/7/2024.       Review of Systems    Objective:      BP (!) 132/58   Pulse 73   Temp 98.2 °F (36.8 °C)   Ht 5' 8" (1.727 m)   Wt 65.3 kg (144 lb)   SpO2 97%   BMI 21.90 kg/m²   Physical Exam    Results for orders placed or performed in visit on 09/23/23   Protein/Creatinine Ratio, Urine   Result Value Ref Range    Protein, Urine Random 13 0 - 15 mg/dL    Creatinine, Urine 77.0 15.0 - 325.0 mg/dL    Prot/Creat Ratio, Urine 0.17 0.00 - 0.20   Urinalysis   Result Value Ref Range    Specimen UA Urine, Clean Catch     Color, UA Yellow Yellow, Straw, [place]    Appearance, UA Clear Clear    pH, UA 6.0 5.0 - 8.0    Specific Gravity, UA 1.010 1.005 - 1.030    Protein, UA Negative Negative    Glucose, UA Negative Negative    Ketones, UA Negative Negative    Bilirubin (UA) Negative Negative    Occult Blood UA Negative Negative    Nitrite, UA Positive (A) Negative    Leukocytes, UA 1+ (A) Negative   Urinalysis Microscopic   Result " Value Ref Range    RBC, UA 5 (H) 0 - 4 /hpf    WBC, UA 20 (H) 0 - 5 /hpf    Bacteria Many (A) None-Occ /hpf    Squam Epithel, UA 3 /hpf    Microscopic Comment SEE COMMENT      Assessment:       1. Essential hypertension    2. DM type 2 with diabetic peripheral neuropathy    3. Stage 3b chronic kidney disease    4. Malignant neoplasm of left kidney, except renal pelvis    5. Controlled type 2 diabetes mellitus with other specified complication, without long-term current use of insulin    6. DM type 2 with diabetic peripheral neuropathy    7. Hypertension associated with type 2 diabetes mellitus    8. Mixed hyperlipidemia    9. Vitamin D deficiency    10. Other iron deficiency anemia    11. Xerosis of skin    12. Primary insomnia        Plan:   1. Essential hypertension  Comments:  controlled, cont all meds no change  Overview:  Chronic not controlled, Hydralazine added to her regimen, RTC for reevaluation    Orders:  -     Lipid Panel; Future; Expected date: 02/07/2024  -     Hepatic Function Panel; Future; Expected date: 02/07/2024  -     amLODIPine (NORVASC) 10 MG tablet; Take 1 tablet (10 mg total) by mouth once daily.  Dispense: 90 tablet; Refill: 3  -     cloNIDine 0.3 mg/24 hr td ptwk (CATAPRES) 0.3 mg/24 hr; Place 1 patch onto the skin every 7 days.  Dispense: 12 patch; Refill: 3  -     hydrALAZINE (APRESOLINE) 25 MG tablet; Take 1 tablet (25 mg total) by mouth 2 (two) times daily.  Dispense: 180 tablet; Refill: 3    2. DM type 2 with diabetic peripheral neuropathy  Overview:  restart gabapentin, she did nto have problem with it in the past, cont to monitor renal function. A1c today.    Orders:  -     gabapentin (NEURONTIN) 100 MG capsule; Take 1 capsule (100 mg total) by mouth 2 (two) times daily.  Dispense: 60 capsule; Refill: 5    3. Stage 3b chronic kidney disease    4. Malignant neoplasm of left kidney, except renal pelvis  Comments:  history of    5. Controlled type 2 diabetes mellitus with other  specified complication, without long-term current use of insulin  -     glimepiride (AMARYL) 4 MG tablet; Take 1 tablet (4 mg total) by mouth before breakfast.  Dispense: 90 tablet; Refill: 3    6. DM type 2 with diabetic peripheral neuropathy  Comments:  with restless leg too, take Gabapentin 2090mg at night, she denies sedation, cont to monitor renal function. A1c today.  Overview:  restart gabapentin, she did nto have problem with it in the past, cont to monitor renal function. A1c today.    Orders:  -     gabapentin (NEURONTIN) 100 MG capsule; Take 1 capsule (100 mg total) by mouth 2 (two) times daily.  Dispense: 60 capsule; Refill: 5    7. Hypertension associated with type 2 diabetes mellitus  -     linaGLIPtin (TRADJENTA) 5 mg Tab tablet; Take 1 tablet (5 mg total) by mouth once daily.  Dispense: 90 tablet; Refill: 3    8. Mixed hyperlipidemia  -     atorvastatin (LIPITOR) 20 MG tablet; Take 1 tablet (20 mg total) by mouth every evening.  Dispense: 90 tablet; Refill: 3    9. Vitamin D deficiency  Comments:  due to ckd 3  Orders:  -     ergocalciferol (ERGOCALCIFEROL) 50,000 unit Cap; Take 1 capsule (50,000 Units total) by mouth once a week.  Dispense: 12 capsule; Refill: 3    10. Other iron deficiency anemia  Comments:  will replete and recheck status on her next visit  Orders:  -     ferrous sulfate (IRON) 325 mg (65 mg iron) Tab tablet; Take 1 tablet (325 mg total) by mouth daily with breakfast.  Dispense: 90 tablet; Refill: 3    11. Xerosis of skin  Comments:  legs, see med  Orders:  -     ammonium lactate (LAC-HYDRIN) 12 % lotion; Apply topically as needed for Dry Skin.  Dispense: 400 mL; Refill: 3    12. Primary insomnia  Comments:  Granddaughter complaining of mild forgetfulness, avoid sleeping aids, use sleep hygiene and if no improvement, try OTC melatonin.    Other orders  -     olmesartan (BENICAR) 40 MG tablet; Take 1 tablet (40 mg total) by mouth once daily.  Dispense: 90 tablet; Refill: 3        I  have reviewed all of the patient's clinical history available in care everywhere and Epic and have utilized this in my evaluation and management recommendations today.      Treatment options and alternatives were discussed with the patient. Patient was given ample time to ask questions. All questions were answered. Voices understanding and acceptance of this advice. Will call back if any further questions or concerns.     Portions of the record may have been created with voice recognition software. Occasional wrong-word or sound-a-like substitutions may have occurred due to the inherent limitations of voice recognition software. Read the chart carefully and recognize, using context, where substitutions have occurred.               Antionette Son MD  [facility], [place]

## 2024-02-08 LAB
25(OH)D3+25(OH)D2 SERPL-MCNC: 73 NG/ML (ref 30–96)
ESTIMATED AVG GLUCOSE: 171 MG/DL (ref 68–131)
HBA1C MFR BLD: 7.6 % (ref 4–5.6)
PTH-INTACT SERPL-MCNC: 36 PG/ML (ref 9–77)

## 2024-02-11 NOTE — PROGRESS NOTES
Your HbA1C is within normal range. Continue your current diabetic medication to maintain the good numbers.     Your cholesterol is normal-continue same med

## 2024-02-12 ENCOUNTER — PATIENT MESSAGE (OUTPATIENT)
Dept: PRIMARY CARE CLINIC | Facility: CLINIC | Age: 84
End: 2024-02-12
Payer: MEDICARE

## 2024-04-08 ENCOUNTER — TELEPHONE (OUTPATIENT)
Dept: NEPHROLOGY | Facility: CLINIC | Age: 84
End: 2024-04-08
Payer: MEDICARE

## 2024-04-08 DIAGNOSIS — N18.32 STAGE 3B CHRONIC KIDNEY DISEASE: Primary | ICD-10-CM

## 2024-04-08 NOTE — TELEPHONE ENCOUNTER
Called to schedule patient for a follow up with Dr. Dias. No answer, left message for patient to return the call.

## 2024-04-11 ENCOUNTER — LAB VISIT (OUTPATIENT)
Dept: LAB | Facility: HOSPITAL | Age: 84
End: 2024-04-11
Attending: INTERNAL MEDICINE
Payer: MEDICARE

## 2024-04-11 DIAGNOSIS — N18.32 STAGE 3B CHRONIC KIDNEY DISEASE: ICD-10-CM

## 2024-04-11 LAB
ALBUMIN SERPL BCP-MCNC: 3.8 G/DL (ref 3.5–5.2)
ANION GAP SERPL CALC-SCNC: 10 MMOL/L (ref 8–16)
BUN SERPL-MCNC: 34 MG/DL (ref 8–23)
CALCIUM SERPL-MCNC: 10.3 MG/DL (ref 8.7–10.5)
CHLORIDE SERPL-SCNC: 107 MMOL/L (ref 95–110)
CO2 SERPL-SCNC: 23 MMOL/L (ref 23–29)
CREAT SERPL-MCNC: 1.3 MG/DL (ref 0.5–1.4)
EST. GFR  (NO RACE VARIABLE): 40.5 ML/MIN/1.73 M^2
GLUCOSE SERPL-MCNC: 162 MG/DL (ref 70–110)
PHOSPHATE SERPL-MCNC: 4 MG/DL (ref 2.7–4.5)
POTASSIUM SERPL-SCNC: 4.7 MMOL/L (ref 3.5–5.1)
SODIUM SERPL-SCNC: 140 MMOL/L (ref 136–145)

## 2024-04-11 PROCEDURE — 80069 RENAL FUNCTION PANEL: CPT | Performed by: INTERNAL MEDICINE

## 2024-04-11 PROCEDURE — 36415 COLL VENOUS BLD VENIPUNCTURE: CPT | Performed by: INTERNAL MEDICINE

## 2024-04-12 ENCOUNTER — PATIENT MESSAGE (OUTPATIENT)
Dept: ADMINISTRATIVE | Facility: HOSPITAL | Age: 84
End: 2024-04-12
Payer: MEDICARE

## 2024-04-15 ENCOUNTER — OFFICE VISIT (OUTPATIENT)
Dept: NEPHROLOGY | Facility: CLINIC | Age: 84
End: 2024-04-15
Payer: MEDICARE

## 2024-04-15 VITALS
HEART RATE: 76 BPM | DIASTOLIC BLOOD PRESSURE: 72 MMHG | BODY MASS INDEX: 22.66 KG/M2 | SYSTOLIC BLOOD PRESSURE: 142 MMHG | WEIGHT: 144.38 LBS | HEIGHT: 67 IN

## 2024-04-15 DIAGNOSIS — I10 PRIMARY HYPERTENSION: ICD-10-CM

## 2024-04-15 DIAGNOSIS — N18.32 STAGE 3B CHRONIC KIDNEY DISEASE: Primary | ICD-10-CM

## 2024-04-15 PROCEDURE — 1160F RVW MEDS BY RX/DR IN RCRD: CPT | Mod: CPTII,S$GLB,, | Performed by: INTERNAL MEDICINE

## 2024-04-15 PROCEDURE — 1101F PT FALLS ASSESS-DOCD LE1/YR: CPT | Mod: CPTII,S$GLB,, | Performed by: INTERNAL MEDICINE

## 2024-04-15 PROCEDURE — 99214 OFFICE O/P EST MOD 30 MIN: CPT | Mod: S$GLB,,, | Performed by: INTERNAL MEDICINE

## 2024-04-15 PROCEDURE — 3077F SYST BP >= 140 MM HG: CPT | Mod: CPTII,S$GLB,, | Performed by: INTERNAL MEDICINE

## 2024-04-15 PROCEDURE — 3078F DIAST BP <80 MM HG: CPT | Mod: CPTII,S$GLB,, | Performed by: INTERNAL MEDICINE

## 2024-04-15 PROCEDURE — 3288F FALL RISK ASSESSMENT DOCD: CPT | Mod: CPTII,S$GLB,, | Performed by: INTERNAL MEDICINE

## 2024-04-15 PROCEDURE — 1159F MED LIST DOCD IN RCRD: CPT | Mod: CPTII,S$GLB,, | Performed by: INTERNAL MEDICINE

## 2024-04-15 PROCEDURE — 1126F AMNT PAIN NOTED NONE PRSNT: CPT | Mod: CPTII,S$GLB,, | Performed by: INTERNAL MEDICINE

## 2024-04-15 PROCEDURE — 99999 PR PBB SHADOW E&M-EST. PATIENT-LVL III: CPT | Mod: PBBFAC,,, | Performed by: INTERNAL MEDICINE

## 2024-04-15 NOTE — PROGRESS NOTES
"Subjective:       Patient ID: Darshana Fortune is a 83 y.o. female.    Chief Complaint: Chronic Kidney Disease    HPI    She presents to clinic today for routine follow-up.  Since her last office visit she has been doing well and has no specific or new complaints.  Her laboratory studies and medications were reviewed.  All Nephrology related questions were answered to her satisfaction.    Review of Systems   Constitutional: Negative.    HENT: Negative.     Respiratory: Negative.     Cardiovascular: Negative.    Gastrointestinal: Negative.    Genitourinary: Negative.    Musculoskeletal: Negative.    Skin: Negative.        BP (!) 142/72   Pulse 76   Ht 5' 7" (1.702 m)   Wt 65.5 kg (144 lb 6.4 oz)   BMI 22.62 kg/m²     Lab Results   Component Value Date    WBC 8.20 08/28/2023    HGB 10.2 (L) 08/28/2023    HCT 33.6 (L) 08/28/2023    MCV 96 08/28/2023     08/28/2023      BMP  Lab Results   Component Value Date     04/11/2024    K 4.7 04/11/2024     04/11/2024    CO2 23 04/11/2024    BUN 34 (H) 04/11/2024    CREATININE 1.3 04/11/2024    CALCIUM 10.3 04/11/2024    ANIONGAP 10 04/11/2024    ESTGFRAFRICA 44.1 (A) 07/15/2022    EGFRNONAA 38.3 (A) 07/15/2022     CMP  Sodium   Date Value Ref Range Status   04/11/2024 140 136 - 145 mmol/L Final     Potassium   Date Value Ref Range Status   04/11/2024 4.7 3.5 - 5.1 mmol/L Final     Chloride   Date Value Ref Range Status   04/11/2024 107 95 - 110 mmol/L Final     CO2   Date Value Ref Range Status   04/11/2024 23 23 - 29 mmol/L Final     Glucose   Date Value Ref Range Status   04/11/2024 162 (H) 70 - 110 mg/dL Final     BUN   Date Value Ref Range Status   04/11/2024 34 (H) 8 - 23 mg/dL Final     Creatinine   Date Value Ref Range Status   04/11/2024 1.3 0.5 - 1.4 mg/dL Final     Calcium   Date Value Ref Range Status   04/11/2024 10.3 8.7 - 10.5 mg/dL Final     Total Protein   Date Value Ref Range Status   02/07/2024 8.2 6.0 - 8.4 g/dL Final     Albumin   Date " Value Ref Range Status   04/11/2024 3.8 3.5 - 5.2 g/dL Final     Total Bilirubin   Date Value Ref Range Status   02/07/2024 0.4 0.1 - 1.0 mg/dL Final     Comment:     For infants and newborns, interpretation of results should be based  on gestational age, weight and in agreement with clinical  observations.    Premature Infant recommended reference ranges:  Up to 24 hours.............<8.0 mg/dL  Up to 48 hours............<12.0 mg/dL  3-5 days..................<15.0 mg/dL  6-29 days.................<15.0 mg/dL       Alkaline Phosphatase   Date Value Ref Range Status   02/07/2024 63 55 - 135 U/L Final     AST   Date Value Ref Range Status   02/07/2024 26 10 - 40 U/L Final     ALT   Date Value Ref Range Status   02/07/2024 25 10 - 44 U/L Final     Anion Gap   Date Value Ref Range Status   04/11/2024 10 8 - 16 mmol/L Final     eGFR if    Date Value Ref Range Status   07/15/2022 44.1 (A) >60 mL/min/1.73 m^2 Final     eGFR if non    Date Value Ref Range Status   07/15/2022 38.3 (A) >60 mL/min/1.73 m^2 Final     Comment:     Calculation used to obtain the estimated glomerular filtration  rate (eGFR) is the CKD-EPI equation.        Current Outpatient Medications on File Prior to Visit   Medication Sig Dispense Refill    amLODIPine (NORVASC) 10 MG tablet Take 1 tablet (10 mg total) by mouth once daily. 90 tablet 3    ammonium lactate (LAC-HYDRIN) 12 % lotion Apply topically as needed for Dry Skin. 400 mL 3    atorvastatin (LIPITOR) 20 MG tablet Take 1 tablet (20 mg total) by mouth every evening. 90 tablet 3    cloNIDine 0.3 mg/24 hr td ptwk (CATAPRES) 0.3 mg/24 hr Place 1 patch onto the skin every 7 days. 12 patch 3    ergocalciferol (ERGOCALCIFEROL) 50,000 unit Cap Take 1 capsule (50,000 Units total) by mouth once a week. 12 capsule 3    ferrous sulfate (IRON) 325 mg (65 mg iron) Tab tablet Take 1 tablet (325 mg total) by mouth daily with breakfast. 90 tablet 3    gabapentin (NEURONTIN) 100  MG capsule Take 1 capsule (100 mg total) by mouth 2 (two) times daily. 60 capsule 5    glimepiride (AMARYL) 4 MG tablet Take 1 tablet (4 mg total) by mouth before breakfast. 90 tablet 3    hydrALAZINE (APRESOLINE) 25 MG tablet Take 1 tablet (25 mg total) by mouth 2 (two) times daily. 180 tablet 3    linaGLIPtin (TRADJENTA) 5 mg Tab tablet Take 1 tablet (5 mg total) by mouth once daily. 90 tablet 3    olmesartan (BENICAR) 40 MG tablet Take 1 tablet (40 mg total) by mouth once daily. 90 tablet 3     No current facility-administered medications on file prior to visit.            Objective:            Physical Exam  Constitutional:       Appearance: Normal appearance.   HENT:      Head: Normocephalic and atraumatic.   Eyes:      General: No scleral icterus.     Extraocular Movements: Extraocular movements intact.      Pupils: Pupils are equal, round, and reactive to light.   Pulmonary:      Effort: Pulmonary effort is normal.      Breath sounds: No stridor.   Musculoskeletal:      Right lower leg: No edema.      Left lower leg: No edema.   Skin:     General: Skin is warm and dry.   Neurological:      General: No focal deficit present.      Mental Status: She is alert and oriented to person, place, and time.   Psychiatric:         Mood and Affect: Mood normal.         Behavior: Behavior normal.       Assessment:       1. Stage 3b chronic kidney disease    2. Primary hypertension        Plan:       1. Creatinine has remained remarkably stable over the past year and a half ranging between 1.2 and 1.4.  EGFR is essentially normal for her age.  Urinalysis is bland without evidence of proteinuria.  She is on a RAAS inhibitor.    Potassium is stable 4.7.  Bicarb is normal at 23.    2. Blood pressure is adequately controlled on current regimen.  Continue RAAS inhibition.        Huey Dias MD

## 2024-05-21 ENCOUNTER — LAB VISIT (OUTPATIENT)
Dept: LAB | Facility: HOSPITAL | Age: 84
End: 2024-05-21
Attending: FAMILY MEDICINE
Payer: MEDICARE

## 2024-05-21 ENCOUNTER — OFFICE VISIT (OUTPATIENT)
Dept: PRIMARY CARE CLINIC | Facility: CLINIC | Age: 84
End: 2024-05-21
Payer: MEDICARE

## 2024-05-21 VITALS
BODY MASS INDEX: 22.54 KG/M2 | TEMPERATURE: 98 F | HEART RATE: 67 BPM | OXYGEN SATURATION: 99 % | WEIGHT: 143.63 LBS | SYSTOLIC BLOOD PRESSURE: 136 MMHG | DIASTOLIC BLOOD PRESSURE: 58 MMHG | HEIGHT: 67 IN

## 2024-05-21 DIAGNOSIS — D63.1 ANEMIA DUE TO STAGE 3B CHRONIC KIDNEY DISEASE: Chronic | ICD-10-CM

## 2024-05-21 DIAGNOSIS — N18.32 ANEMIA DUE TO STAGE 3B CHRONIC KIDNEY DISEASE: ICD-10-CM

## 2024-05-21 DIAGNOSIS — N18.30 CKD STAGE 3 DUE TO TYPE 2 DIABETES MELLITUS: Primary | Chronic | ICD-10-CM

## 2024-05-21 DIAGNOSIS — I10 ESSENTIAL HYPERTENSION: Chronic | ICD-10-CM

## 2024-05-21 DIAGNOSIS — N18.32 ANEMIA DUE TO STAGE 3B CHRONIC KIDNEY DISEASE: Chronic | ICD-10-CM

## 2024-05-21 DIAGNOSIS — E78.2 MIXED HYPERLIPIDEMIA: ICD-10-CM

## 2024-05-21 DIAGNOSIS — D63.1 ANEMIA DUE TO STAGE 3B CHRONIC KIDNEY DISEASE: ICD-10-CM

## 2024-05-21 DIAGNOSIS — E11.22 CKD STAGE 3 DUE TO TYPE 2 DIABETES MELLITUS: Primary | Chronic | ICD-10-CM

## 2024-05-21 PROCEDURE — 1101F PT FALLS ASSESS-DOCD LE1/YR: CPT | Mod: CPTII,S$GLB,, | Performed by: FAMILY MEDICINE

## 2024-05-21 PROCEDURE — 85025 COMPLETE CBC W/AUTO DIFF WBC: CPT | Performed by: FAMILY MEDICINE

## 2024-05-21 PROCEDURE — 3078F DIAST BP <80 MM HG: CPT | Mod: CPTII,S$GLB,, | Performed by: FAMILY MEDICINE

## 2024-05-21 PROCEDURE — 1159F MED LIST DOCD IN RCRD: CPT | Mod: CPTII,S$GLB,, | Performed by: FAMILY MEDICINE

## 2024-05-21 PROCEDURE — 99999 PR PBB SHADOW E&M-EST. PATIENT-LVL IV: CPT | Mod: PBBFAC,,, | Performed by: FAMILY MEDICINE

## 2024-05-21 PROCEDURE — 99214 OFFICE O/P EST MOD 30 MIN: CPT | Mod: S$GLB,,, | Performed by: FAMILY MEDICINE

## 2024-05-21 PROCEDURE — 82728 ASSAY OF FERRITIN: CPT | Performed by: FAMILY MEDICINE

## 2024-05-21 PROCEDURE — 83540 ASSAY OF IRON: CPT | Performed by: FAMILY MEDICINE

## 2024-05-21 PROCEDURE — 3288F FALL RISK ASSESSMENT DOCD: CPT | Mod: CPTII,S$GLB,, | Performed by: FAMILY MEDICINE

## 2024-05-21 PROCEDURE — 36415 COLL VENOUS BLD VENIPUNCTURE: CPT | Mod: PN | Performed by: FAMILY MEDICINE

## 2024-05-21 PROCEDURE — 3075F SYST BP GE 130 - 139MM HG: CPT | Mod: CPTII,S$GLB,, | Performed by: FAMILY MEDICINE

## 2024-05-21 NOTE — PROGRESS NOTES
Subjective:       Patient ID: Darshana Fortune is a 83 y.o. female.    Chief Complaint: Follow-up, Diabetes Mellitus, Anemia, and Hypertension      History of Present Illness:   Darshana Fortune 83 y.o. female presents today with Follow-up, Diabetes Mellitus, Anemia, and Hypertension  Here with her daughter who contributed to the history.  Darshana Fortune's allergies, medications, history, and problem list were updated as appropriate.   Past Medical History:   Diagnosis Date    Cerebral aneurysm without rupture 1998    went to [facility] in [place]    Diabetes mellitus     Hypertension     Malignant neoplasm of left kidney 2017    removed     No family history on file.  Social History     Socioeconomic History    Marital status:    Tobacco Use    Smoking status: Never    Smokeless tobacco: Never   Substance and Sexual Activity    Alcohol use: No    Drug use: No     Social Determinants of Health     Financial Resource Strain: Patient Declined (5/14/2024)    Overall Financial Resource Strain (CARDIA)     Difficulty of Paying Living Expenses: Patient declined   Food Insecurity: Patient Declined (5/14/2024)    Hunger Vital Sign     Worried About Running Out of Food in the Last Year: Patient declined     Ran Out of Food in the Last Year: Patient declined   Transportation Needs: Patient Declined (5/14/2024)    PRAPARE - Transportation     Lack of Transportation (Medical): Patient declined     Lack of Transportation (Non-Medical): Patient declined   Physical Activity: Unknown (5/14/2024)    Exercise Vital Sign     Days of Exercise per Week: Patient declined   Stress: Patient Declined (5/14/2024)    [employer] - Occupational Stress Questionnaire     Feeling of Stress : Patient declined   Housing Stability: Unknown (5/14/2024)    Housing Stability Vital Sign     Unable to Pay for Housing in the Last Year: Patient declined     Outpatient Encounter Medications as of 5/21/2024    Medication Sig Dispense Refill    amLODIPine (NORVASC) 10 MG tablet Take 1 tablet (10 mg total) by mouth once daily. 90 tablet 3    ammonium lactate (LAC-HYDRIN) 12 % lotion Apply topically as needed for Dry Skin. 400 mL 3    atorvastatin (LIPITOR) 20 MG tablet Take 1 tablet (20 mg total) by mouth every evening. 90 tablet 3    cloNIDine 0.3 mg/24 hr td ptwk (CATAPRES) 0.3 mg/24 hr Place 1 patch onto the skin every 7 days. 12 patch 3    ergocalciferol (ERGOCALCIFEROL) 50,000 unit Cap Take 1 capsule (50,000 Units total) by mouth once a week. 12 capsule 3    gabapentin (NEURONTIN) 100 MG capsule Take 1 capsule (100 mg total) by mouth 2 (two) times daily. 60 capsule 5    glimepiride (AMARYL) 4 MG tablet Take 1 tablet (4 mg total) by mouth before breakfast. 90 tablet 3    hydrALAZINE (APRESOLINE) 25 MG tablet Take 1 tablet (25 mg total) by mouth 2 (two) times daily. 180 tablet 3    linaGLIPtin (TRADJENTA) 5 mg Tab tablet Take 1 tablet (5 mg total) by mouth once daily. 90 tablet 3    olmesartan (BENICAR) 40 MG tablet Take 1 tablet (40 mg total) by mouth once daily. 90 tablet 3    ferrous sulfate (IRON) 325 mg (65 mg iron) Tab tablet Take 1 tablet (325 mg total) by mouth daily with breakfast. (Patient not taking: Reported on 5/21/2024) 90 tablet 3     No facility-administered encounter medications on file as of 5/21/2024.       Review of Systems   Constitutional:  Negative for activity change and unexpected weight change.   HENT:  Negative for hearing loss, rhinorrhea and trouble swallowing.    Eyes:  Negative for discharge and visual disturbance.   Respiratory:  Negative for chest tightness and wheezing.    Cardiovascular:  Negative for chest pain and palpitations.   Gastrointestinal:  Negative for blood in stool, constipation, diarrhea and vomiting.   Endocrine: Negative for polydipsia and polyuria.   Genitourinary:  Negative for difficulty urinating, dysuria, hematuria and menstrual problem.   Musculoskeletal:   "Negative for arthralgias, joint swelling and neck pain.   Neurological:  Negative for weakness and headaches.   Psychiatric/Behavioral:  Negative for confusion and dysphoric mood.        Objective:      BP (!) 136/58 (BP Location: Left arm, Patient Position: Sitting, BP Method: Medium (Manual))   Pulse 67   Temp 98.1 °F (36.7 °C)   Ht 5' 7" (1.702 m)   Wt 65.1 kg (143 lb 9.6 oz)   SpO2 99%   BMI 22.49 kg/m²   Physical Exam  Vitals and nursing note reviewed.   Constitutional:       General: She is not in acute distress.     Appearance: She is well-developed.   HENT:      Head: Normocephalic and atraumatic.      Right Ear: External ear normal.      Left Ear: External ear normal.   Eyes:      Conjunctiva/sclera: Conjunctivae normal.   Neck:      Thyroid: No thyromegaly.   Cardiovascular:      Rate and Rhythm: Normal rate and regular rhythm.      Pulses: Normal pulses.      Heart sounds: Normal heart sounds. No murmur heard.  Pulmonary:      Effort: Pulmonary effort is normal. No respiratory distress.      Breath sounds: Normal breath sounds.   Genitourinary:     Comments: deferred  Musculoskeletal:         General: No deformity.      Cervical back: Neck supple.   Lymphadenopathy:      Head:      Right side of head: No submandibular adenopathy.      Left side of head: No submandibular adenopathy.      Cervical: No cervical adenopathy.   Skin:     General: Skin is warm and dry.   Neurological:      Mental Status: She is alert and oriented to person, place, and time.   Psychiatric:         Behavior: Behavior normal.           Results for orders placed or performed in visit on 04/11/24   Renal Function Panel   Result Value Ref Range    Glucose 162 (H) 70 - 110 mg/dL    Sodium 140 136 - 145 mmol/L    Potassium 4.7 3.5 - 5.1 mmol/L    Chloride 107 95 - 110 mmol/L    CO2 23 23 - 29 mmol/L    BUN 34 (H) 8 - 23 mg/dL    Calcium 10.3 8.7 - 10.5 mg/dL    Creatinine 1.3 0.5 - 1.4 mg/dL    Albumin 3.8 3.5 - 5.2 g/dL    " Phosphorus 4.0 2.7 - 4.5 mg/dL    eGFR 40.5 (A) >60 mL/min/1.73 m^2    Anion Gap 10 8 - 16 mmol/L     Assessment:       1. CKD stage 3 due to type 2 diabetes mellitus    2. Anemia due to stage 3b chronic kidney disease    3. Essential hypertension    4. Mixed hyperlipidemia        Plan:   1. CKD stage 3 due to type 2 diabetes mellitus  Comments:  stable, lab reviewed, no change in med    2. Anemia due to stage 3b chronic kidney disease  Comments:  stopped taking med due to constipation, asking for level to be checked. Advised to try smooth move.  Orders:  -     CBC Auto Differential; Future; Expected date: 05/21/2024  -     Ferritin; Future; Expected date: 05/21/2024  -     Iron and TIBC; Future; Expected date: 05/21/2024    3. Essential hypertension  Comments:  controlled, no change in med  Overview:  Chronic not controlled, Hydralazine added to her regimen, RTC for reevaluation      4. Mixed hyperlipidemia    The ASCVD Risk score ([place] DK, et al., 2019) failed to calculate for the following reasons:    The 2019 ASCVD risk score is only valid for ages 40 to 79   Cont statin    I have reviewed all of the patient's clinical history available in care everywhere and Epic and have utilized this in my evaluation and management recommendations today.      Treatment options and alternatives were discussed with the patient. Patient was given ample time to ask questions. All questions were answered. Voices understanding and acceptance of this advice. Will call back if any further questions or concerns.     Portions of the record may have been created with voice recognition software. Occasional wrong-word or sound-a-like substitutions may have occurred due to the inherent limitations of voice recognition software. Read the chart carefully and recognize, using context, where substitutions have occurred.               Antionette Son MD  [facility],

## 2024-05-21 NOTE — PATIENT INSTRUCTIONS
"Diabetes and Diet   The Basics   Written by the doctors and editors at [facility]   Why is diet important in diabetes? -- Diet is important because it is part of diabetes treatment. Many people need to change what they eat and how much they eat to help treat their diabetes. It is important for people to treat their diabetes so that they:  Keep their blood sugar at or near a normal level  Prevent long-term problems, such as heart or kidney problems, that can happen in people with diabetes  Changing your diet can also help treat obesity, high blood pressure, and high cholesterol. These conditions can affect people with diabetes and can lead to future problems, such as heart attacks or strokes.  Who will work with me to change my diet? -- Your doctor or nurse will work with you to make a food plan to change your diet. They might also recommend that you work with a "dietitian." A dietitian is an expert on food and eating.  Do I need to eat at the same times every day? -- When and how often you should eat depends, in part, on the diabetes medicines you take. For example:  People who take about the same amount of insulin at the same time each day (called a "fixed regimen") should eat meals at the same times. This is also true for people who take pills that increase insulin levels, such as sulfonylureas. Eating meals at the same time every day helps prevent low blood sugar.  People who adjust the dose and timing of their insulin each day (called a "flexible regimen") do not always have to eat meals at the same time. That's because they can time their insulin dose for before they plan to eat, and also adjust the dose for how much they plan to eat.  People who take medicines that don't usually cause low blood sugar, such as metformin, don't have to eat meals at the same time every day.  What do I need to think about when planning what to eat? -- Our bodies break down the food we eat into small pieces called carbohydrates, " "proteins, and fats.  When planning what to eat, people with diabetes need to think about:  Carbohydrates (or "carbs") - Carbohydrates, which are sugars that our bodies use for energy, can raise a person's blood sugar level. Your doctor, nurse, or dietitian will tell you how many carbohydrates you should eat at each meal or snack. Foods that have carbohydrates include:  Bread, pasta, and rice  Vegetables and fruits  Dairy foods  Foods and drinks with added sugar  It is best to get your carbohydrates from fruits, vegetables, whole grains, and low-fat milk. It is best to avoid drinks with added sugar, like soda, juices, and sports drinks.   Protein - Your doctor, nurse, or dietitian will tell you how much protein you should eat each day. It is best to eat lean meats, fish, eggs, beans, peas, soy products, nuts, and seeds.  Fats - The type of fat you eat is more important than the amount of fat. "Saturated" and "trans" fats can increase your risk for heart problems, like a heart attack.  Foods that have saturated fats include meat, butter, cheese, and ice cream.  Foods that have trans fats include processed food with "partially hydrogenated oils" on the ingredient list. This may include fried foods, store bought cookies, muffins, pies, and cakes.  "Monounsaturated" and "polyunsaturated" fats are better for you. Foods with these types of fat include fish, avocado, olive oil, and nuts.  Calories - People need to eat a certain amount of calories each day to keep their weight the same. People who are overweight and want to lose weight need to eat fewer calories each day.  Fiber - Eating foods with a lot of fiber can help control a person's blood sugar level. Foods that have a lot of fiber include apples, green beans, peas, beans, lentils, nuts, oatmeal, and whole grains.  Salt - People who have high blood pressure should not eat foods that contain a lot of salt (also called sodium). People with high blood pressure should " also eat healthy foods, such as fruits, vegetables, and low-fat dairy foods.  Alcohol - Having more than 1 drink (for women) or 2 drinks (for men) a day can raise blood sugar levels. Also, drinks that have fruit juice or soda in them can raise blood sugar levels.  What can I do if I need to lose weight? -- If you need to lose weight, you can:  Exercise - Try to get at least 30 minutes of physical activity a day, most days of the week. Even gentle exercise, like walking, is good for your health. Some people with diabetes need to change their medicine dose before they exercise. They might also need to check their blood sugar levels before and after exercising.  Eat fewer calories - Your doctor, nurse, or dietitian can tell you how many calories you should eat each day in order to lose weight.  If you are worried about your weight, size, or shape, talk with your doctor, nurse, or dietitian. They can help you make changes to improve your health.  Can I eat the same foods as my family? -- Yes. You do not need to eat special foods if you have diabetes. You and your family can eat the same foods. Changing your diet is mostly about eating healthy foods and not eating too much.  What are the other parts of diabetes treatment? -- Besides changing your diet, the other parts of diabetes treatment are:  Exercise  Medicines  Some people with diabetes need to learn how to match their diet and exercise with their medicine dose. For example, people who use insulin might need to choose the dose of insulin they give themselves. To choose their dose, they need to think about:  What they plan to eat at the next meal  How much exercise they plan to do  What their blood sugar level is  If the diet and exercise do not match the medicine dose, a person's blood sugar level can get too low or too high. Blood sugar levels that are too low or too high can cause problems.  All topics are updated as new evidence becomes available and our peer  review process is complete.  This topic retrieved from [employer] on: Sep 21, 2021.  Topic 24541 Version 7.0  Release: 29.4.2 - C29.263  © 2021 UpToDate, Inc. and/or its affiliates. All rights reserved.  Consumer Information Use and Disclaimer   This information is not specific medical advice and does not replace information you receive from your health care provider. This is only a brief summary of general information. It does NOT include all information about conditions, illnesses, injuries, tests, procedures, treatments, therapies, discharge instructions or life-style choices that may apply to you. You must talk with your health care provider for complete information about your health and treatment options. This information should not be used to decide whether or not to accept your health care provider's advice, instructions or recommendations. Only your health care provider has the knowledge and training to provide advice that is right for you. The use of this information is governed by the [employer] End User License Agreement, available at https://www.[employer]iseworks.LifeScribe/en/solutions/[employer]/about/mundo.The use of [employer] content is governed by the [employer] Terms of Use. ©2021 UpToDate, Inc. All rights reserved.  Copyright

## 2024-05-22 ENCOUNTER — LAB VISIT (OUTPATIENT)
Dept: LAB | Facility: HOSPITAL | Age: 84
End: 2024-05-22
Attending: UROLOGY
Payer: MEDICARE

## 2024-05-22 ENCOUNTER — OFFICE VISIT (OUTPATIENT)
Dept: UROLOGY | Facility: CLINIC | Age: 84
End: 2024-05-22
Payer: MEDICARE

## 2024-05-22 VITALS
HEIGHT: 67 IN | DIASTOLIC BLOOD PRESSURE: 70 MMHG | RESPIRATION RATE: 16 BRPM | SYSTOLIC BLOOD PRESSURE: 176 MMHG | BODY MASS INDEX: 22.11 KG/M2 | HEART RATE: 74 BPM | WEIGHT: 140.88 LBS

## 2024-05-22 DIAGNOSIS — N18.32 CHRONIC KIDNEY DISEASE (CKD) STAGE G3B/A1, MODERATELY DECREASED GLOMERULAR FILTRATION RATE (GFR) BETWEEN 30-44 ML/MIN/1.73 SQUARE METER AND ALBUMINURIA CREATININE RATIO LESS THAN 30 MG/G: ICD-10-CM

## 2024-05-22 DIAGNOSIS — Z85.528 PERSONAL HISTORY OF RENAL CANCER: ICD-10-CM

## 2024-05-22 DIAGNOSIS — N28.1 ACQUIRED CYST OF KIDNEY: Primary | ICD-10-CM

## 2024-05-22 LAB
BASOPHILS # BLD AUTO: 0.04 K/UL (ref 0–0.2)
BASOPHILS NFR BLD: 0.5 % (ref 0–1.9)
DIFFERENTIAL METHOD BLD: ABNORMAL
EOSINOPHIL # BLD AUTO: 0.1 K/UL (ref 0–0.5)
EOSINOPHIL NFR BLD: 1.2 % (ref 0–8)
ERYTHROCYTE [DISTWIDTH] IN BLOOD BY AUTOMATED COUNT: 13.2 % (ref 11.5–14.5)
FERRITIN SERPL-MCNC: 186 NG/ML (ref 20–300)
HCT VFR BLD AUTO: 33 % (ref 37–48.5)
HGB BLD-MCNC: 10.7 G/DL (ref 12–16)
IMM GRANULOCYTES # BLD AUTO: 0.02 K/UL (ref 0–0.04)
IMM GRANULOCYTES NFR BLD AUTO: 0.3 % (ref 0–0.5)
IRON SERPL-MCNC: 58 UG/DL (ref 30–160)
LYMPHOCYTES # BLD AUTO: 1.7 K/UL (ref 1–4.8)
LYMPHOCYTES NFR BLD: 23.4 % (ref 18–48)
MCH RBC QN AUTO: 30.9 PG (ref 27–31)
MCHC RBC AUTO-ENTMCNC: 32.4 G/DL (ref 32–36)
MCV RBC AUTO: 95 FL (ref 82–98)
MONOCYTES # BLD AUTO: 0.6 K/UL (ref 0.3–1)
MONOCYTES NFR BLD: 8.4 % (ref 4–15)
NEUTROPHILS # BLD AUTO: 4.9 K/UL (ref 1.8–7.7)
NEUTROPHILS NFR BLD: 66.2 % (ref 38–73)
NRBC BLD-RTO: 0 /100 WBC
PLATELET # BLD AUTO: 245 K/UL (ref 150–450)
PMV BLD AUTO: 10.7 FL (ref 9.2–12.9)
RBC # BLD AUTO: 3.46 M/UL (ref 4–5.4)
SATURATED IRON: 16 % (ref 20–50)
TOTAL IRON BINDING CAPACITY: 366 UG/DL (ref 250–450)
TRANSFERRIN SERPL-MCNC: 247 MG/DL (ref 200–375)
WBC # BLD AUTO: 7.4 K/UL (ref 3.9–12.7)

## 2024-05-22 PROCEDURE — 1126F AMNT PAIN NOTED NONE PRSNT: CPT | Mod: CPTII,S$GLB,, | Performed by: UROLOGY

## 2024-05-22 PROCEDURE — 36415 COLL VENOUS BLD VENIPUNCTURE: CPT | Performed by: UROLOGY

## 2024-05-22 PROCEDURE — 3077F SYST BP >= 140 MM HG: CPT | Mod: CPTII,S$GLB,, | Performed by: UROLOGY

## 2024-05-22 PROCEDURE — 1101F PT FALLS ASSESS-DOCD LE1/YR: CPT | Mod: CPTII,S$GLB,, | Performed by: UROLOGY

## 2024-05-22 PROCEDURE — 99214 OFFICE O/P EST MOD 30 MIN: CPT | Mod: S$GLB,,, | Performed by: UROLOGY

## 2024-05-22 PROCEDURE — 82565 ASSAY OF CREATININE: CPT | Performed by: UROLOGY

## 2024-05-22 PROCEDURE — 99999 PR PBB SHADOW E&M-EST. PATIENT-LVL IV: CPT | Mod: PBBFAC,,, | Performed by: UROLOGY

## 2024-05-22 PROCEDURE — 3078F DIAST BP <80 MM HG: CPT | Mod: CPTII,S$GLB,, | Performed by: UROLOGY

## 2024-05-22 PROCEDURE — 3288F FALL RISK ASSESSMENT DOCD: CPT | Mod: CPTII,S$GLB,, | Performed by: UROLOGY

## 2024-05-22 PROCEDURE — 1159F MED LIST DOCD IN RCRD: CPT | Mod: CPTII,S$GLB,, | Performed by: UROLOGY

## 2024-05-22 NOTE — PROGRESS NOTES
Subjective:       Patient ID: Darshana Fortune is a 83 y.o. female.    Chief Complaint: kidney cancer      History of Present Illness:     Ms Fortune is here today with her daughter.  Denies LUTS.  No gross hematuria.  She has CKD and Dr Dias is her Nephrologist.  She has a history of a 5.4 cm left upper pole renal mass s/p left robotic partial nephrectomy and left robotic adrenalectomy by me on 1-17-17.  Path:  Clear cell renal cell carcinoma, grade 2, margins positive, pT1bNX.  Unremarkable adrenal tissue that is negative for tumor.  All of her surveillance imaging has not shown any evidence of disease recurrence or metastatic disease.           Past Medical History:   Diagnosis Date    Cerebral aneurysm without rupture 1998    went to [facility] in [place]    Diabetes mellitus     Hypertension     Malignant neoplasm of left kidney 2017    removed     Family History   Problem Relation Name Age of Onset    No Known Problems Father      No Known Problems Mother      No Known Problems Maternal Aunt      No Known Problems Maternal Uncle      No Known Problems Paternal Aunt      No Known Problems Paternal Uncle      No Known Problems Paternal Grandmother      No Known Problems Paternal Grandfather      No Known Problems Maternal Grandmother      No Known Problems Maternal Grandfather       Social History     Socioeconomic History    Marital status:    Tobacco Use    Smoking status: Never    Smokeless tobacco: Never   Substance and Sexual Activity    Alcohol use: No    Drug use: No    Sexual activity: Not Currently     Partners: Male     Social Determinants of Health     Financial Resource Strain: Patient Declined (5/14/2024)    Overall Financial Resource Strain (CARDIA)     Difficulty of Paying Living Expenses: Patient declined   Food Insecurity: Patient Declined (5/14/2024)    Hunger Vital Sign     Worried About Running Out of Food in the Last Year: Patient declined     Ran Out of Food in the Last  Year: Patient declined   Transportation Needs: Patient Declined (5/14/2024)    PRAPARE - Transportation     Lack of Transportation (Medical): Patient declined     Lack of Transportation (Non-Medical): Patient declined   Physical Activity: Unknown (5/14/2024)    Exercise Vital Sign     Days of Exercise per Week: Patient declined   Stress: Patient Declined (5/14/2024)    [employer] - Occupational Stress Questionnaire     Feeling of Stress : Patient declined   Housing Stability: Unknown (5/14/2024)    Housing Stability Vital Sign     Unable to Pay for Housing in the Last Year: Patient declined     Outpatient Encounter Medications as of 5/22/2024   Medication Sig Dispense Refill    amLODIPine (NORVASC) 10 MG tablet Take 1 tablet (10 mg total) by mouth once daily. 90 tablet 3    ammonium lactate (LAC-HYDRIN) 12 % lotion Apply topically as needed for Dry Skin. 400 mL 3    atorvastatin (LIPITOR) 20 MG tablet Take 1 tablet (20 mg total) by mouth every evening. 90 tablet 3    cloNIDine 0.3 mg/24 hr td ptwk (CATAPRES) 0.3 mg/24 hr Place 1 patch onto the skin every 7 days. 12 patch 3    ergocalciferol (ERGOCALCIFEROL) 50,000 unit Cap Take 1 capsule (50,000 Units total) by mouth once a week. 12 capsule 3    gabapentin (NEURONTIN) 100 MG capsule Take 1 capsule (100 mg total) by mouth 2 (two) times daily. 60 capsule 5    glimepiride (AMARYL) 4 MG tablet Take 1 tablet (4 mg total) by mouth before breakfast. 90 tablet 3    hydrALAZINE (APRESOLINE) 25 MG tablet Take 1 tablet (25 mg total) by mouth 2 (two) times daily. 180 tablet 3    linaGLIPtin (TRADJENTA) 5 mg Tab tablet Take 1 tablet (5 mg total) by mouth once daily. 90 tablet 3    olmesartan (BENICAR) 40 MG tablet Take 1 tablet (40 mg total) by mouth once daily. 90 tablet 3    ferrous sulfate (IRON) 325 mg (65 mg iron) Tab tablet Take 1 tablet (325 mg total) by mouth daily with breakfast. (Patient not taking: Reported on 5/21/2024) 90 tablet 3  "    No facility-administered encounter medications on file as of 5/22/2024.        Review of Systems   Constitutional:  Negative for chills and fever.   Respiratory:  Negative for shortness of breath.    Cardiovascular:  Negative for chest pain.   Gastrointestinal:  Negative for nausea and vomiting.   Genitourinary:  Negative for difficulty urinating and hematuria.   Musculoskeletal:  Negative for back pain.   Skin:  Negative for rash.   Neurological:  Negative for dizziness.   Psychiatric/Behavioral:  Negative for agitation.        Objective:     BP (!) 176/70 (BP Location: Left arm, Patient Position: Sitting)   Pulse 74   Resp 16   Ht 5' 7" (1.702 m)   Wt 63.9 kg (140 lb 14 oz)   BMI 22.06 kg/m²     Physical Exam  Constitutional:       General: She is not in acute distress.  Pulmonary:      Effort: Pulmonary effort is normal.   Abdominal:      General: There is no distension.      Palpations: Abdomen is soft.   Neurological:      Mental Status: She is alert and oriented to person, place, and time.         No visits with results within 1 Day(s) from this visit.   Latest known visit with results is:   Lab Visit on 05/21/2024   Component Date Value Ref Range Status    WBC 05/21/2024 7.40  3.90 - 12.70 K/uL Final    RBC 05/21/2024 3.46 (L)  4.00 - 5.40 M/uL Final    Hemoglobin 05/21/2024 10.7 (L)  12.0 - 16.0 g/dL Final    Hematocrit 05/21/2024 33.0 (L)  37.0 - 48.5 % Final    MCV 05/21/2024 95  82 - 98 fL Final    MCH 05/21/2024 30.9  27.0 - 31.0 pg Final    MCHC 05/21/2024 32.4  32.0 - 36.0 g/dL Final    RDW 05/21/2024 13.2  11.5 - 14.5 % Final    Platelets 05/21/2024 245  150 - 450 K/uL Final    MPV 05/21/2024 10.7  9.2 - 12.9 fL Final    Immature Granulocytes 05/21/2024 0.3  0.0 - 0.5 % Final    Gran # (ANC) 05/21/2024 4.9  1.8 - 7.7 K/uL Final    Immature Grans (Abs) 05/21/2024 0.02  0.00 - 0.04 K/uL Final    Comment: Mild elevation in immature granulocytes is non specific and   can be seen in a variety of " conditions including stress response,   acute inflammation, trauma and pregnancy. Correlation with other   laboratory and clinical findings is essential.      Lymph # 05/21/2024 1.7  1.0 - 4.8 K/uL Final    Mono # 05/21/2024 0.6  0.3 - 1.0 K/uL Final    Eos # 05/21/2024 0.1  0.0 - 0.5 K/uL Final    Baso # 05/21/2024 0.04  0.00 - 0.20 K/uL Final    nRBC 05/21/2024 0  0 /100 WBC Final    Gran % 05/21/2024 66.2  38.0 - 73.0 % Final    Lymph % 05/21/2024 23.4  18.0 - 48.0 % Final    Mono % 05/21/2024 8.4  4.0 - 15.0 % Final    Eosinophil % 05/21/2024 1.2  0.0 - 8.0 % Final    Basophil % 05/21/2024 0.5  0.0 - 1.9 % Final    Differential Method 05/21/2024 Automated   Final    Ferritin 05/21/2024 186  20.0 - 300.0 ng/mL Final    Iron 05/21/2024 58  30 - 160 ug/dL Final    Transferrin 05/21/2024 247  200 - 375 mg/dL Final    TIBC 05/21/2024 366  250 - 450 ug/dL Final    Saturated Iron 05/21/2024 16 (L)  20 - 50 % Final        No results found for this or any previous visit (from the past 8760 hour(s)).     Assessment:       1. Acquired cyst of kidney    2. Personal history of renal cancer    3. Chronic kidney disease (CKD) stage G3b/A1, moderately decreased glomerular filtration rate (GFR) between 30-44 mL/min/1.73 square meter and albuminuria creatinine ratio less than 30 mg/g      Plan:     Orders Placed This Encounter    MRI Abdomen W WO Contrast    CT Chest Without Contrast    CREATININE, SERUM        4-11-24  Cr 1.3.  GFR 40.5.    I reviewed the above lab results.       Assessment:  - History of a 5.4 cm left upper pole renal mass s/p left robotic partial nephrectomy and left robotic adrenalectomy by me on 1-17-17.  Path:  Clear cell renal cell carcinoma, grade 2, margins positive, pT1bNX.  Unremarkable adrenal tissue that is negative for tumor.  - All of her surveillance imaging has not shown any evidence of disease recurrence or metastatic disease.  - CKD.  Dr Dias is her Nephrologist.    Plan:  - MRI abdomen with  and without IV contrast and CT chest without IV contrast both in a few weeks.    - Creatinine today.  - I discussed dietary modifications with her today and I recommended she drink mostly water during the day.   - RTC in 1 year.

## 2024-05-23 LAB
CREAT SERPL-MCNC: 1.2 MG/DL (ref 0.5–1.4)
EST. GFR  (NO RACE VARIABLE): 44.6 ML/MIN/1.73 M^2

## 2024-05-24 ENCOUNTER — TELEPHONE (OUTPATIENT)
Dept: UROLOGY | Facility: CLINIC | Age: 84
End: 2024-05-24
Payer: MEDICARE

## 2024-05-24 NOTE — TELEPHONE ENCOUNTER
Patient aware of results, rescheduled follow up appointment.    ----- Message from Sesar Moss MD sent at 5/23/2024  7:48 PM CDT -----  Please call Ms Fortune and speak with her daughter and let them know that I reviewed her lab result and her GFR (which is a measurement of her overall kidney function) is low at 44.6 but has improved from her previous GFR of 40.5 on 4-11-24.  I recommend she drink mostly water.  Make sure they know the details of upcoming imaging appointments.  Change her 1 year follow up appointment to mid June 2025 (1 year from her imaging).  Ask them if they have any questions.

## 2024-05-27 ENCOUNTER — TELEPHONE (OUTPATIENT)
Dept: PRIMARY CARE CLINIC | Facility: CLINIC | Age: 84
End: 2024-05-27
Payer: MEDICARE

## 2024-05-27 NOTE — TELEPHONE ENCOUNTER
Lab results and provider instructions reviewed by patient via [employer].      ----- Message from Antionette Son MD sent at 5/25/2024  6:55 PM CDT -----  I have reviewed all your lab results.   Blood count, kidney function, liver function, electrolytes, cholesterol and thyroid function are all normal.  Your A1C is normal, therefore you do not have diabetes.  Yearly HIV, Hep B, Hep C, Syphilis screen are negative.    Maintain/Continue a heathy lifestyle.  Be more active. If you are able, walk for 35 mins 5 times a week.      Please do not hesitate to contact us if you have any questions.

## 2024-05-28 ENCOUNTER — TELEPHONE (OUTPATIENT)
Dept: PRIMARY CARE CLINIC | Facility: CLINIC | Age: 84
End: 2024-05-28
Payer: MEDICARE

## 2024-05-28 NOTE — TELEPHONE ENCOUNTER
Lab results and provider instructions reviewed by patient via [employer].      ----- Message from Antionette Son MD sent at 5/25/2024  6:55 PM CDT -----  I have reviewed all your lab results.   Blood count, kidney function, liver function, electrolytes, cholesterol and thyroid function are all normal.  Your A1C is normal, therefore you do not have diabetes.  Yearly HIV, Hep B, Hep C, Syphilis screen are negative.    Maintain/Continue a heathy lifestyle.  Be more active. If you are able, walk for 35 mins 5 times a week.      Please do not hesitate to contact us if you have any questions.

## 2024-06-19 ENCOUNTER — HOSPITAL ENCOUNTER (OUTPATIENT)
Dept: RADIOLOGY | Facility: HOSPITAL | Age: 84
Discharge: HOME | End: 2024-06-19
Attending: UROLOGY
Payer: MEDICARE

## 2024-06-19 DIAGNOSIS — Z85.528 PERSONAL HISTORY OF RENAL CANCER: ICD-10-CM

## 2024-06-19 DIAGNOSIS — N28.1 ACQUIRED CYST OF KIDNEY: ICD-10-CM

## 2024-06-19 PROCEDURE — A9585 GADOBUTROL INJECTION: HCPCS | Performed by: UROLOGY

## 2024-06-19 PROCEDURE — 74183 MRI ABD W/O CNTR FLWD CNTR: CPT | Mod: TC

## 2024-06-19 PROCEDURE — 71250 CT THORAX DX C-: CPT | Mod: 26,,, | Performed by: RADIOLOGY

## 2024-06-19 PROCEDURE — 74183 MRI ABD W/O CNTR FLWD CNTR: CPT | Mod: 26,,, | Performed by: RADIOLOGY

## 2024-06-19 PROCEDURE — 25500020 PHARM REV CODE 255: Performed by: UROLOGY

## 2024-06-19 PROCEDURE — 71250 CT THORAX DX C-: CPT | Mod: TC

## 2024-06-19 RX ORDER — GADOBUTROL 604.72 MG/ML
6.5 INJECTION INTRAVENOUS
Status: COMPLETED | OUTPATIENT
Start: 2024-06-19 | End: 2024-06-19

## 2024-06-19 RX ADMIN — GADOBUTROL 6.5 ML: 604.72 INJECTION INTRAVENOUS at 12:06

## 2024-06-21 DIAGNOSIS — Z85.528 PERSONAL HISTORY OF RENAL CANCER: ICD-10-CM

## 2024-06-21 DIAGNOSIS — N28.1 ACQUIRED CYST OF KIDNEY: Primary | ICD-10-CM

## 2024-06-25 ENCOUNTER — PATIENT MESSAGE (OUTPATIENT)
Dept: UROLOGY | Facility: CLINIC | Age: 84
End: 2024-06-25
Payer: MEDICARE

## 2024-07-09 ENCOUNTER — PATIENT MESSAGE (OUTPATIENT)
Dept: ADMINISTRATIVE | Facility: HOSPITAL | Age: 84
End: 2024-07-09
Payer: MEDICARE

## 2024-08-26 ENCOUNTER — LAB VISIT (OUTPATIENT)
Dept: LAB | Facility: HOSPITAL | Age: 84
End: 2024-08-26
Attending: FAMILY MEDICINE
Payer: MEDICARE

## 2024-08-26 ENCOUNTER — PATIENT MESSAGE (OUTPATIENT)
Dept: OTOLARYNGOLOGY | Facility: CLINIC | Age: 84
End: 2024-08-26
Payer: MEDICARE

## 2024-08-26 ENCOUNTER — OFFICE VISIT (OUTPATIENT)
Dept: PRIMARY CARE CLINIC | Facility: CLINIC | Age: 84
End: 2024-08-26
Payer: MEDICARE

## 2024-08-26 VITALS
BODY MASS INDEX: 21.85 KG/M2 | OXYGEN SATURATION: 99 % | SYSTOLIC BLOOD PRESSURE: 136 MMHG | HEART RATE: 65 BPM | TEMPERATURE: 98 F | WEIGHT: 139.19 LBS | HEIGHT: 67 IN | DIASTOLIC BLOOD PRESSURE: 60 MMHG

## 2024-08-26 DIAGNOSIS — Z23 IMMUNIZATION DUE: ICD-10-CM

## 2024-08-26 DIAGNOSIS — N18.30 CKD STAGE 3 DUE TO TYPE 2 DIABETES MELLITUS: ICD-10-CM

## 2024-08-26 DIAGNOSIS — E11.22 CKD STAGE 3 DUE TO TYPE 2 DIABETES MELLITUS: ICD-10-CM

## 2024-08-26 DIAGNOSIS — H91.93 BILATERAL HEARING LOSS, UNSPECIFIED HEARING LOSS TYPE: Primary | ICD-10-CM

## 2024-08-26 DIAGNOSIS — E11.69 CONTROLLED TYPE 2 DIABETES MELLITUS WITH OTHER SPECIFIED COMPLICATION, WITHOUT LONG-TERM CURRENT USE OF INSULIN: ICD-10-CM

## 2024-08-26 DIAGNOSIS — D63.8 ANEMIA OF CHRONIC DISEASE: ICD-10-CM

## 2024-08-26 PROCEDURE — 3075F SYST BP GE 130 - 139MM HG: CPT | Mod: CPTII,S$GLB,, | Performed by: FAMILY MEDICINE

## 2024-08-26 PROCEDURE — 99999 PR PBB SHADOW E&M-EST. PATIENT-LVL V: CPT | Mod: PBBFAC,,, | Performed by: FAMILY MEDICINE

## 2024-08-26 PROCEDURE — 1160F RVW MEDS BY RX/DR IN RCRD: CPT | Mod: CPTII,S$GLB,, | Performed by: FAMILY MEDICINE

## 2024-08-26 PROCEDURE — 99214 OFFICE O/P EST MOD 30 MIN: CPT | Mod: S$GLB,,, | Performed by: FAMILY MEDICINE

## 2024-08-26 PROCEDURE — 1101F PT FALLS ASSESS-DOCD LE1/YR: CPT | Mod: CPTII,S$GLB,, | Performed by: FAMILY MEDICINE

## 2024-08-26 PROCEDURE — 1126F AMNT PAIN NOTED NONE PRSNT: CPT | Mod: CPTII,S$GLB,, | Performed by: FAMILY MEDICINE

## 2024-08-26 PROCEDURE — 1159F MED LIST DOCD IN RCRD: CPT | Mod: CPTII,S$GLB,, | Performed by: FAMILY MEDICINE

## 2024-08-26 PROCEDURE — 3288F FALL RISK ASSESSMENT DOCD: CPT | Mod: CPTII,S$GLB,, | Performed by: FAMILY MEDICINE

## 2024-08-26 PROCEDURE — 3078F DIAST BP <80 MM HG: CPT | Mod: CPTII,S$GLB,, | Performed by: FAMILY MEDICINE

## 2024-08-26 NOTE — PROGRESS NOTES
Subjective:      Chief Complaint   Patient presents with    Follow-up     Audiology referral-hearing check       Patient ID: Darshana Fortune is a 83 y.o. female.  History of Present Illness    CHIEF COMPLAINT:  Darshana presents today for follow up.    DIABETES AND CHRONIC KIDNEY DISEASE:  She reports her diabetes is controlled and is due for her A1c test.     CKD 3: She also has chronic kidney disease stage III, which is currently stable. She is due for her three-month follow-up lab work to monitor kidney function.    HYPERTENSION:  She reports fluctuating blood pressure readings at home, with the most recent measurement being 163/71 with a heart rate of 70. She is taking lisinopril and hydrochlorothiazide daily as prescribed for blood pressure management. BP is good in the clinic today.    MOBILITY ISSUES:  She experiences stiffness in her right knee, particularly when initially getting up and moving. She uses a cane at home for assistance. Despite these challenges, she has been able to engage in walking activities in stores, which she finds therapeutic. She has a lot of equipment at home to help with mobility and declined referral to physical therapy services.      HEARING CONCERNS:  She reports bilateral ear issues and expresses a desire to have her hearing checked.    DIET AND ACTIVITIES:  She reports her diet is good. She recently went on shopping trips to [place] and [place], which involved extensive walking throughout the stores. She finds these indoor shopping activities to be therapeutic, especially given the current hot weather conditions outside.    FAMILY:  She reports having a 4-year-old great-grandson and another on the way.    ROS:  General: -fever, -chills, -fatigue, -weight gain, -weight loss  Eyes: -vision changes, -redness, -discharge  ENT: -ear pain, -nasal congestion, -sore throat, +hearing loss  Cardiovascular: -chest pain, -palpitations, -lower extremity edema  Respiratory: -cough, -shortness of  breath  Gastrointestinal: -abdominal pain, -nausea, -vomiting, -diarrhea, -constipation, -blood in stool  Genitourinary: -dysuria, -hematuria, -frequency  Musculoskeletal: +joint pain, -muscle pain  Skin: -rash, -lesion  Neurological: -headache, -dizziness, -numbness, -tingling  Psychiatric: -anxiety, -depression, +sleep difficulty       Darshana Fortune's allergies, medications, history, and problem list were updated as appropriate.  Past Medical History:   Diagnosis Date    Cerebral aneurysm without rupture 1998    went to [facility] in [place]    Diabetes mellitus     Hypertension     Malignant neoplasm of left kidney 2017    removed     Family History   Problem Relation Name Age of Onset    No Known Problems Father      No Known Problems Mother      No Known Problems Maternal Aunt      No Known Problems Maternal Uncle      No Known Problems Paternal Aunt      No Known Problems Paternal Uncle      No Known Problems Paternal Grandmother      No Known Problems Paternal Grandfather      No Known Problems Maternal Grandmother      No Known Problems Maternal Grandfather       Social History     Socioeconomic History    Marital status:    Tobacco Use    Smoking status: Never    Smokeless tobacco: Never   Substance and Sexual Activity    Alcohol use: No    Drug use: No    Sexual activity: Not Currently     Partners: Male     Social Determinants of Health     Financial Resource Strain: Patient Declined (5/14/2024)    Overall Financial Resource Strain (CARDIA)     Difficulty of Paying Living Expenses: Patient declined   Food Insecurity: Patient Declined (5/14/2024)    Hunger Vital Sign     Worried About Running Out of Food in the Last Year: Patient declined     Ran Out of Food in the Last Year: Patient declined   Transportation Needs: Patient Declined (5/14/2024)    PRAPARE - Transportation     Lack of Transportation (Medical): Patient declined     Lack of Transportation (Non-Medical): Patient declined    Physical Activity: Unknown (5/14/2024)    Exercise Vital Sign     Days of Exercise per Week: Patient declined   Stress: Patient Declined (5/14/2024)    [employer] [place] [employer] - Occupational Stress Questionnaire     Feeling of Stress : Patient declined   Housing Stability: Unknown (5/14/2024)    Housing Stability Vital Sign     Unable to Pay for Housing in the Last Year: Patient declined     Outpatient Encounter Medications as of 8/26/2024   Medication Sig Dispense Refill    amLODIPine (NORVASC) 10 MG tablet Take 1 tablet (10 mg total) by mouth once daily. 90 tablet 3    ammonium lactate (LAC-HYDRIN) 12 % lotion Apply topically as needed for Dry Skin. 400 mL 3    atorvastatin (LIPITOR) 20 MG tablet Take 1 tablet (20 mg total) by mouth every evening. 90 tablet 3    cloNIDine 0.3 mg/24 hr td ptwk (CATAPRES) 0.3 mg/24 hr Place 1 patch onto the skin every 7 days. 12 patch 3    ergocalciferol (ERGOCALCIFEROL) 50,000 unit Cap Take 1 capsule (50,000 Units total) by mouth once a week. 12 capsule 3    gabapentin (NEURONTIN) 100 MG capsule Take 1 capsule (100 mg total) by mouth 2 (two) times daily. 60 capsule 5    glimepiride (AMARYL) 4 MG tablet Take 1 tablet (4 mg total) by mouth before breakfast. 90 tablet 3    hydrALAZINE (APRESOLINE) 25 MG tablet Take 1 tablet (25 mg total) by mouth 2 (two) times daily. 180 tablet 3    linaGLIPtin (TRADJENTA) 5 mg Tab tablet Take 1 tablet (5 mg total) by mouth once daily. 90 tablet 3    olmesartan (BENICAR) 40 MG tablet Take 1 tablet (40 mg total) by mouth once daily. 90 tablet 3    ferrous sulfate (IRON) 325 mg (65 mg iron) Tab tablet Take 1 tablet (325 mg total) by mouth daily with breakfast. (Patient not taking: Reported on 5/21/2024) 90 tablet 3     Facility-Administered Encounter Medications as of 8/26/2024   Medication Dose Route Frequency Provider Last Rate Last Admin    varicella-zoster gE vac,2 of 2 SusR 0.5 mL  0.5 mL Intramuscular 1 time in Clinic/HOD       "         Objective:      /60 (BP Location: Right arm, Patient Position: Sitting, BP Method: Medium (Manual))   Pulse 65   Temp 97.5 °F (36.4 °C)   Ht 5' 7" (1.702 m)   Wt 63.1 kg (139 lb 3.2 oz)   SpO2 99%   BMI 21.80 kg/m²   Physical Exam    General: In no acute distress.  Head: Normocephalic. Non traumatic.  Eyes: PERRLA. EOMs full. Conjunctivae clear.   Ears: EACs clear. TMs normal.  Nose: Mucosa pink. Mucosa moist. No obstruction.  Throat: Clear. No exudates. No lesions.  Neck: Supple. No masses. No thyromegaly. No bruits.  Chest: Lungs clear. No rales. No rhonchi. No wheezes.  Heart: RRR. No murmurs. No rubs. No gallops.  Abdomen: Soft. No tenderness. No masses. BS normal.  : Normal external genitalia. No lesions. No discharge. No hernias  noted.  Back: Normal curvature. No scoliosis. No tenderness.  Extremities: Knee: right knee, dec ROM  Neuro: No focal deficits appreciated. Good muscle tone. Normal response to visual stimuli. Normal response to auditory stimuli.  Skin: Normal. No rashes. No lesions noted.  Vitals: Blood pressure: 136/60. Heart rate: 70.        Results for orders placed or performed in visit on 05/22/24   CREATININE, SERUM   Result Value Ref Range    Creatinine 1.2 0.5 - 1.4 mg/dL    eGFR 44.6 (A) >60 mL/min/1.73 m^2     Assessment/Plan:         1. Bilateral hearing loss, unspecified hearing loss type    2. CKD stage 3 due to type 2 diabetes mellitus    3. Anemia of chronic disease    4. Controlled type 2 diabetes mellitus with other specified complication, without long-term current use of insulin    5. Immunization due      Bilateral hearing loss, unspecified hearing loss type  -     Ambulatory referral/consult to Audiology; Future; Expected date: 09/02/2024    CKD stage 3 due to type 2 diabetes mellitus  -     Hemoglobin A1C; Future; Expected date: 08/26/2024  -     RENAL FUNCTION PANEL; Future; Expected date: 08/26/2024  -     Microalbumin/Creatinine Ratio, Urine; Future; " Expected date: 08/26/2024    Anemia of chronic disease  -     CBC Auto Differential; Future; Expected date: 08/26/2024  -     FERRITIN; Future; Expected date: 08/26/2024    Controlled type 2 diabetes mellitus with other specified complication, without long-term current use of insulin  -     Cancel: Ambulatory referral/consult to Optometry; Future; Expected date: 09/02/2024    Immunization due  -     varicella-zoster gE vac,2 of 2 SusR 0.5 mL      DIABETES:  - Assessed diabetes control; patient due for A1c.  - Ordered A1c test.  CHRONIC KIDNEY DISEASE:  - Evaluated chronic kidney disease stage III; patient due for 3-month follow-up lab.  - Ordered 3-month follow-up lab for kidney function.  EYE HEALTH:  - Noted patient had eye exam with Dr. Delgadillo last November; due for next exam in November this year.  ARTHRITIS:  - Observed patient's gait and mobility; noted stiffness in right knee, likely due to arthritis.  - Considered physical therapy for mobility issues but patient declined.  - Darshana to continue walking for exercise.  HYPERTENSION:  - Reviewed blood pressure control; noted variability between clinic (136/60).  - Continued lisinopril and hydrochlorothiazide for blood pressure management.  HEARING PROBLEMS:  - Referred patient to audiology for evaluation of bilateral hearing problems.  FOLLOW UP:  - Follow up in 3-4 months.  - Contact the office to review lab work results and adjust treatment plan as needed.            I have reviewed all of the patient's clinical history available in care everywhere and Epic and have utilized this in my evaluation and management recommendations today.      Treatment options and alternatives were discussed with the patient. Patient was given ample time to ask questions. All questions were answered. Voices understanding and acceptance of this advice. Will call back if any further questions or concerns.         I spent a total of 60 minutes face to face and non-face to face on  the date of this visit.This includes time preparing to see the patient (eg, review of tests, notes), obtaining and/or reviewing additional history from an independent historian and/or outside medical records, documenting clinical information in the electronic health record, independently interpreting results and/or communicating results to the patient/family/caregiver, or care coordinator.  Visit today included increased complexity associated with the care of the episodic problem addressed and managing the longitudinal care of the patient due to the serious and/or complex managed problem(s).    Portions of the record may have been created with voice recognition software. Occasional wrong-word or sound-a-like substitutions may have occurred due to the inherent limitations of voice recognition software. Read the chart carefully and recognize, using context, where substitutions have occurred.      This note was generated with the assistance of ambient listening technology. Verbal consent was obtained by the patient and accompanying visitor(s) for the recording of patient appointment to facilitate this note. I attest to having reviewed and edited the generated note for accuracy, though some syntax or spelling errors may persist. Please contact the author of this note for any clarification.            Antionette Son MD  [facility],

## 2024-08-26 NOTE — PATIENT INSTRUCTIONS
"Diabetes and Diet   The Basics   Written by the doctors and editors at [facility]   Why is diet important in diabetes? -- Diet is important because it is part of diabetes treatment. Many people need to change what they eat and how much they eat to help treat their diabetes. It is important for people to treat their diabetes so that they:  Keep their blood sugar at or near a normal level  Prevent long-term problems, such as heart or kidney problems, that can happen in people with diabetes  Changing your diet can also help treat obesity, high blood pressure, and high cholesterol. These conditions can affect people with diabetes and can lead to future problems, such as heart attacks or strokes.  Who will work with me to change my diet? -- Your doctor or nurse will work with you to make a food plan to change your diet. They might also recommend that you work with a "dietitian." A dietitian is an expert on food and eating.  Do I need to eat at the same times every day? -- When and how often you should eat depends, in part, on the diabetes medicines you take. For example:  People who take about the same amount of insulin at the same time each day (called a "fixed regimen") should eat meals at the same times. This is also true for people who take pills that increase insulin levels, such as sulfonylureas. Eating meals at the same time every day helps prevent low blood sugar.  People who adjust the dose and timing of their insulin each day (called a "flexible regimen") do not always have to eat meals at the same time. That's because they can time their insulin dose for before they plan to eat, and also adjust the dose for how much they plan to eat.  People who take medicines that don't usually cause low blood sugar, such as metformin, don't have to eat meals at the same time every day.  What do I need to think about when planning what to eat? -- Our bodies break down the food we eat into small pieces called carbohydrates, " "proteins, and fats.  When planning what to eat, people with diabetes need to think about:  Carbohydrates (or "carbs") - Carbohydrates, which are sugars that our bodies use for energy, can raise a person's blood sugar level. Your doctor, nurse, or dietitian will tell you how many carbohydrates you should eat at each meal or snack. Foods that have carbohydrates include:  Bread, pasta, and rice  Vegetables and fruits  Dairy foods  Foods and drinks with added sugar  It is best to get your carbohydrates from fruits, vegetables, whole grains, and low-fat milk. It is best to avoid drinks with added sugar, like soda, juices, and sports drinks.   Protein - Your doctor, nurse, or dietitian will tell you how much protein you should eat each day. It is best to eat lean meats, fish, eggs, beans, peas, soy products, nuts, and seeds.  Fats - The type of fat you eat is more important than the amount of fat. "Saturated" and "trans" fats can increase your risk for heart problems, like a heart attack.  Foods that have saturated fats include meat, butter, cheese, and ice cream.  Foods that have trans fats include processed food with "partially hydrogenated oils" on the ingredient list. This may include fried foods, store bought cookies, muffins, pies, and cakes.  "Monounsaturated" and "polyunsaturated" fats are better for you. Foods with these types of fat include fish, avocado, olive oil, and nuts.  Calories - People need to eat a certain amount of calories each day to keep their weight the same. People who are overweight and want to lose weight need to eat fewer calories each day.  Fiber - Eating foods with a lot of fiber can help control a person's blood sugar level. Foods that have a lot of fiber include apples, green beans, peas, beans, lentils, nuts, oatmeal, and whole grains.  Salt - People who have high blood pressure should not eat foods that contain a lot of salt (also called sodium). People with high blood pressure should " also eat healthy foods, such as fruits, vegetables, and low-fat dairy foods.  Alcohol - Having more than 1 drink (for women) or 2 drinks (for men) a day can raise blood sugar levels. Also, drinks that have fruit juice or soda in them can raise blood sugar levels.  What can I do if I need to lose weight? -- If you need to lose weight, you can:  Exercise - Try to get at least 30 minutes of physical activity a day, most days of the week. Even gentle exercise, like walking, is good for your health. Some people with diabetes need to change their medicine dose before they exercise. They might also need to check their blood sugar levels before and after exercising.  Eat fewer calories - Your doctor, nurse, or dietitian can tell you how many calories you should eat each day in order to lose weight.  If you are worried about your weight, size, or shape, talk with your doctor, nurse, or dietitian. They can help you make changes to improve your health.  Can I eat the same foods as my family? -- Yes. You do not need to eat special foods if you have diabetes. You and your family can eat the same foods. Changing your diet is mostly about eating healthy foods and not eating too much.  What are the other parts of diabetes treatment? -- Besides changing your diet, the other parts of diabetes treatment are:  Exercise  Medicines  Some people with diabetes need to learn how to match their diet and exercise with their medicine dose. For example, people who use insulin might need to choose the dose of insulin they give themselves. To choose their dose, they need to think about:  What they plan to eat at the next meal  How much exercise they plan to do  What their blood sugar level is  If the diet and exercise do not match the medicine dose, a person's blood sugar level can get too low or too high. Blood sugar levels that are too low or too high can cause problems.  All topics are updated as new evidence becomes available and our peer  review process is complete.  This topic retrieved from [employer] on: Sep 21, 2021.  Topic 03545 Version 7.0  Release: 29.4.2 - C29.263  © 2021 UpToDate, Inc. and/or its affiliates. All rights reserved.  Consumer Information Use and Disclaimer   This information is not specific medical advice and does not replace information you receive from your health care provider. This is only a brief summary of general information. It does NOT include all information about conditions, illnesses, injuries, tests, procedures, treatments, therapies, discharge instructions or life-style choices that may apply to you. You must talk with your health care provider for complete information about your health and treatment options. This information should not be used to decide whether or not to accept your health care provider's advice, instructions or recommendations. Only your health care provider has the knowledge and training to provide advice that is right for you. The use of this information is governed by the [employer] End User License Agreement, available at https://www.[employer]/en/solutions/[employer]/about/mundo.The use of [employer] content is governed by the [employer] Terms of Use. ©2021 UpToDate, Inc. All rights reserved.  Copyright

## 2024-08-27 ENCOUNTER — TELEPHONE (OUTPATIENT)
Dept: PRIMARY CARE CLINIC | Facility: CLINIC | Age: 84
End: 2024-08-27
Payer: MEDICARE

## 2024-08-27 NOTE — TELEPHONE ENCOUNTER
I have attempted without success to contact this patient by phone to discuss lab results. No Answer lvm          ----- Message from Antionette Son MD sent at 8/27/2024 12:37 PM CDT -----  CKD 3 is stable  Anemia is stable  A1c is normal  Continue all your medications

## 2024-09-05 ENCOUNTER — CLINICAL SUPPORT (OUTPATIENT)
Dept: AUDIOLOGY | Facility: CLINIC | Age: 84
End: 2024-09-05
Payer: MEDICARE

## 2024-09-05 DIAGNOSIS — H91.93 BILATERAL HEARING LOSS, UNSPECIFIED HEARING LOSS TYPE: ICD-10-CM

## 2024-09-09 NOTE — PROGRESS NOTES
Darshana COSTA Tong was seen 09/09/2024 for an audiological evaluation. Otoscopy revealed cerumen impaction. This will need to be removed by ENT prior to testing.    Recommendations:  Follow-up with ENT, as scheduled.   Repeat audiological evaluation, as scheduled.

## 2024-09-18 ENCOUNTER — OFFICE VISIT (OUTPATIENT)
Dept: OTOLARYNGOLOGY | Facility: CLINIC | Age: 84
End: 2024-09-18
Payer: MEDICARE

## 2024-09-18 ENCOUNTER — CLINICAL SUPPORT (OUTPATIENT)
Dept: AUDIOLOGY | Facility: CLINIC | Age: 84
End: 2024-09-18
Payer: MEDICARE

## 2024-09-18 VITALS — HEIGHT: 68 IN | BODY MASS INDEX: 20.95 KG/M2 | WEIGHT: 138.25 LBS

## 2024-09-18 DIAGNOSIS — H90.3 HEARING LOSS, SENSORINEURAL, ASYMMETRICAL: Primary | ICD-10-CM

## 2024-09-18 DIAGNOSIS — H61.23 BILATERAL IMPACTED CERUMEN: ICD-10-CM

## 2024-09-18 DIAGNOSIS — H93.13 TINNITUS OF BOTH EARS: ICD-10-CM

## 2024-09-18 DIAGNOSIS — H90.3 SENSORINEURAL HEARING LOSS (SNHL) OF BOTH EARS: Primary | ICD-10-CM

## 2024-09-18 PROCEDURE — 99999 PR PBB SHADOW E&M-EST. PATIENT-LVL III: CPT | Mod: PBBFAC,,, | Performed by: PHYSICIAN ASSISTANT

## 2024-09-18 PROCEDURE — 92557 COMPREHENSIVE HEARING TEST: CPT | Mod: S$GLB,,, | Performed by: AUDIOLOGIST

## 2024-09-18 PROCEDURE — 99203 OFFICE O/P NEW LOW 30 MIN: CPT | Mod: 25,S$GLB,, | Performed by: PHYSICIAN ASSISTANT

## 2024-09-18 PROCEDURE — 69210 REMOVE IMPACTED EAR WAX UNI: CPT | Mod: S$GLB,,, | Performed by: PHYSICIAN ASSISTANT

## 2024-09-18 PROCEDURE — 1126F AMNT PAIN NOTED NONE PRSNT: CPT | Mod: CPTII,S$GLB,, | Performed by: PHYSICIAN ASSISTANT

## 2024-09-18 PROCEDURE — 3288F FALL RISK ASSESSMENT DOCD: CPT | Mod: CPTII,S$GLB,, | Performed by: PHYSICIAN ASSISTANT

## 2024-09-18 PROCEDURE — 99999 PR PBB SHADOW E&M-EST. PATIENT-LVL I: CPT | Mod: PBBFAC,,, | Performed by: AUDIOLOGIST

## 2024-09-18 PROCEDURE — 1101F PT FALLS ASSESS-DOCD LE1/YR: CPT | Mod: CPTII,S$GLB,, | Performed by: PHYSICIAN ASSISTANT

## 2024-09-18 PROCEDURE — 1159F MED LIST DOCD IN RCRD: CPT | Mod: CPTII,S$GLB,, | Performed by: PHYSICIAN ASSISTANT

## 2024-09-18 PROCEDURE — 92567 TYMPANOMETRY: CPT | Mod: S$GLB,,, | Performed by: AUDIOLOGIST

## 2024-09-18 NOTE — PROGRESS NOTES
Subjective     Patient ID: Darshana Fortune is a 83 y.o. female.    Chief Complaint: Cerumen Impaction (Pt is coming in today for ear cleaning and then audio.)    Patient is a very pleasant 83 y.o. female here to see me today for the first time for evaluation of hearing loss.  She reports hearing loss that has been gradually progressing over the last few years.  Her daughter is here with her today and reports that they often need to repeat themselves when speaking with her.  She has noted a difference in hearing between the ears, with the left ear being the better hearing ear.  She has noted occasional tinnitus in both ears.  She has not had any recent issues with ear pain or ear drainage.  She denies a family history of hearing loss, and has not had any previous otologic surgery.  She denies any history of significant loud noise exposure. She denies issues with dizziness.  She was here recently for audiogram and noted to have cerumen impactions AU.  She has been using a wax softening drop since that time.        Review of Systems   Constitutional: Negative.    HENT:  Positive for hearing loss and tinnitus. Negative for ear discharge and ear pain.    Eyes: Negative.    Respiratory: Negative.     Cardiovascular: Negative.    Gastrointestinal: Negative.    Endocrine: Negative.    Genitourinary: Negative.    Musculoskeletal: Negative.    Integumentary:  Negative.   Allergic/Immunologic: Negative.    Neurological: Negative.  Negative for dizziness.   Hematological: Negative.    Psychiatric/Behavioral: Negative.            Objective     Physical Exam  Constitutional:       Appearance: Normal appearance.   HENT:      Head: Normocephalic and atraumatic.      Jaw: No trismus.      Right Ear: External ear normal. Decreased hearing noted. No drainage. There is impacted cerumen (removal described below).      Left Ear: External ear normal. Decreased hearing noted. No drainage. There is impacted cerumen.      Nose: Nose normal.  No congestion.      Mouth/Throat:      Mouth: Mucous membranes are moist.      Dentition: Has dentures.      Pharynx: Oropharynx is clear.   Eyes:      Pupils: Pupils are equal, round, and reactive to light.   Pulmonary:      Effort: Pulmonary effort is normal.   Neurological:      General: No focal deficit present.      Mental Status: She is alert.   Psychiatric:         Behavior: Behavior is cooperative.       Procedure Note    CHIEF COMPLAINT:  Cerumen Impaction    Description:  The patient was seated in an exam chair.  An ear speculum was placed in the right EAC and was examined under the microscope.  Suction and/or loop curettes were used to remove a large cerumen impaction.  The tympanic membrane was visualized and was normal in appearance.  The procedure was repeated on the left side in a similar fashion.  The TM was intact and normal on this side as well.  The patient tolerated the procedure well.        AUDIOGRAM:         Assessment and Plan     1. Sensorineural hearing loss (SNHL) of both ears    2. Tinnitus of both ears    3. Bilateral impacted cerumen        Reviewed the patient's recent audiogram and hearing loss in detail.  She is a good candidate for hearing aids, if and when she the patient is motivated.  She was given handouts with information and pricing of hearing aids, and will contact audiology when ready to proceed.  We also discussed the use hearing protection when exposed to loud noise, including lawn equipment.     We also discussed that tinnitus is most often caused by a hearing loss, and that as the hair cells are damaged, either genetic or as a result of loud noise exposure, they then cause tinnitus.  Some patients find that restricting the salt or caffeine in their diet helps, and there is also an OTC supplement, lipoflavinoids, that some people find to be effective though their benefit is not fully proven.  Tinnitus tends to be louder in times of stress and fatigue, and may decrease  with time.  Sound machines may also be an effective masking technique if needed at night.     Cerumen impaction:  Removed today without difficulty.  I would recommend the use of a wax softening drop, either over the counter Debrox or mineral oil, on a weekly basis.  I also instructed the patient to avoid Qtips.           No follow-ups on file.

## 2024-09-18 NOTE — PROGRESS NOTES
Darshana Fortune was seen 09/18/2024 for an audiological evaluation. Patient's family complains of bilateral gradual hearing loss. She is asking others to repeat often and misunderstanding conversational speech. Ms. Fortune feels that her hearing is adequate and is not interested in hearing aids. She denies any tinnitus or dizziness.     Results reveal a mild-to-severe sensorineural hearing loss 250-8000 Hz for the right ear, and  mild-to-severe sensorineural hearing loss 250-8000 Hz for the left ear.   Speech Reception Thresholds were  35 dBHL for the right ear and 30 dBHL for the left ear.   Word recognition scores were excellent for the right ear and excellent for the left ear.  Tympanograms were Type A, normal for the right ear and Type A, normal for the left ear.    Patient was counseled on the above findings.    Recommendations:  Follow-up with ENT, as scheduled.   Repeat audiological evaluation in 1-2 years to monitor hearing, or sooner if needed.  Consider a hearing aid consultation. Patient provided with a copy of audiogram.   Wear hearing protection devices when around loud noise.

## 2024-10-07 ENCOUNTER — PATIENT MESSAGE (OUTPATIENT)
Dept: RESEARCH | Facility: HOSPITAL | Age: 84
End: 2024-10-07
Payer: MEDICARE

## 2024-10-30 ENCOUNTER — OFFICE VISIT (OUTPATIENT)
Dept: PRIMARY CARE CLINIC | Facility: CLINIC | Age: 84
End: 2024-10-30
Payer: MEDICARE

## 2024-10-30 VITALS
SYSTOLIC BLOOD PRESSURE: 136 MMHG | BODY MASS INDEX: 21.28 KG/M2 | WEIGHT: 140.38 LBS | HEART RATE: 66 BPM | TEMPERATURE: 98 F | DIASTOLIC BLOOD PRESSURE: 62 MMHG | OXYGEN SATURATION: 97 % | HEIGHT: 68 IN

## 2024-10-30 DIAGNOSIS — Z23 FLU VACCINE NEED: ICD-10-CM

## 2024-10-30 DIAGNOSIS — E78.2 MIXED HYPERLIPIDEMIA: Chronic | ICD-10-CM

## 2024-10-30 DIAGNOSIS — I10 ESSENTIAL HYPERTENSION: Primary | Chronic | ICD-10-CM

## 2024-10-30 DIAGNOSIS — I70.0 AORTIC ATHEROSCLEROSIS: Chronic | ICD-10-CM

## 2024-10-30 DIAGNOSIS — E11.42 DM TYPE 2 WITH DIABETIC PERIPHERAL NEUROPATHY: Chronic | ICD-10-CM

## 2024-10-30 PROCEDURE — 3078F DIAST BP <80 MM HG: CPT | Mod: CPTII,S$GLB,, | Performed by: FAMILY MEDICINE

## 2024-10-30 PROCEDURE — G0008 ADMIN INFLUENZA VIRUS VAC: HCPCS | Mod: S$GLB,,, | Performed by: FAMILY MEDICINE

## 2024-10-30 PROCEDURE — 99999 PR PBB SHADOW E&M-EST. PATIENT-LVL IV: CPT | Mod: PBBFAC,,, | Performed by: FAMILY MEDICINE

## 2024-10-30 PROCEDURE — 3075F SYST BP GE 130 - 139MM HG: CPT | Mod: CPTII,S$GLB,, | Performed by: FAMILY MEDICINE

## 2024-10-30 PROCEDURE — 1126F AMNT PAIN NOTED NONE PRSNT: CPT | Mod: CPTII,S$GLB,, | Performed by: FAMILY MEDICINE

## 2024-10-30 PROCEDURE — 90653 IIV ADJUVANT VACCINE IM: CPT | Mod: S$GLB,,, | Performed by: FAMILY MEDICINE

## 2024-10-30 PROCEDURE — 99214 OFFICE O/P EST MOD 30 MIN: CPT | Mod: S$GLB,,, | Performed by: FAMILY MEDICINE

## 2024-10-30 PROCEDURE — 1101F PT FALLS ASSESS-DOCD LE1/YR: CPT | Mod: CPTII,S$GLB,, | Performed by: FAMILY MEDICINE

## 2024-10-30 PROCEDURE — 1159F MED LIST DOCD IN RCRD: CPT | Mod: CPTII,S$GLB,, | Performed by: FAMILY MEDICINE

## 2024-10-30 PROCEDURE — 3288F FALL RISK ASSESSMENT DOCD: CPT | Mod: CPTII,S$GLB,, | Performed by: FAMILY MEDICINE

## 2024-11-18 ENCOUNTER — TELEPHONE (OUTPATIENT)
Dept: OTOLARYNGOLOGY | Facility: CLINIC | Age: 84
End: 2024-11-18
Payer: MEDICARE

## 2024-11-18 NOTE — TELEPHONE ENCOUNTER
----- Message from Lyla sent at 11/15/2024  4:23 PM CST -----  Contact: Carolina/granddaughter  Carolina/granddaughter would like a call back at [phone] in regards to needing to schedule patient an appointment for a follow up for hearing aids.  Thanks   Am

## 2024-12-27 DIAGNOSIS — E11.69 CONTROLLED TYPE 2 DIABETES MELLITUS WITH OTHER SPECIFIED COMPLICATION, WITHOUT LONG-TERM CURRENT USE OF INSULIN: Chronic | ICD-10-CM

## 2024-12-27 DIAGNOSIS — E78.2 MIXED HYPERLIPIDEMIA: Chronic | ICD-10-CM

## 2024-12-27 DIAGNOSIS — I10 ESSENTIAL HYPERTENSION: ICD-10-CM

## 2024-12-27 DIAGNOSIS — E55.9 VITAMIN D DEFICIENCY: Chronic | ICD-10-CM

## 2024-12-27 RX ORDER — CLONIDINE 0.3 MG/24H
1 PATCH, EXTENDED RELEASE TRANSDERMAL
Qty: 12 PATCH | Refills: 3 | Status: CANCELLED | OUTPATIENT
Start: 2024-12-27 | End: 2025-12-27

## 2024-12-27 RX ORDER — ERGOCALCIFEROL 1.25 MG/1
50000 CAPSULE ORAL WEEKLY
Qty: 12 CAPSULE | Refills: 3 | Status: CANCELLED | OUTPATIENT
Start: 2024-12-27 | End: 2025-12-27

## 2024-12-27 RX ORDER — HYDRALAZINE HYDROCHLORIDE 25 MG/1
25 TABLET, FILM COATED ORAL 2 TIMES DAILY
Qty: 180 TABLET | Refills: 3 | Status: CANCELLED | OUTPATIENT
Start: 2024-12-27 | End: 2025-12-27

## 2024-12-27 RX ORDER — OLMESARTAN MEDOXOMIL 40 MG/1
40 TABLET ORAL DAILY
Qty: 90 TABLET | Refills: 3 | Status: CANCELLED | OUTPATIENT
Start: 2024-12-27 | End: 2025-12-27

## 2024-12-27 RX ORDER — ATORVASTATIN CALCIUM 20 MG/1
20 TABLET, FILM COATED ORAL NIGHTLY
Qty: 90 TABLET | Refills: 3 | Status: CANCELLED | OUTPATIENT
Start: 2024-12-27 | End: 2025-12-27

## 2024-12-27 RX ORDER — AMLODIPINE BESYLATE 10 MG/1
10 TABLET ORAL DAILY
Qty: 90 TABLET | Refills: 3 | Status: CANCELLED | OUTPATIENT
Start: 2024-12-27 | End: 2025-12-27

## 2024-12-27 RX ORDER — GLIMEPIRIDE 4 MG/1
4 TABLET ORAL
Qty: 90 TABLET | Refills: 3 | Status: CANCELLED | OUTPATIENT
Start: 2024-12-27 | End: 2025-12-27

## 2025-01-16 ENCOUNTER — LAB VISIT (OUTPATIENT)
Dept: LAB | Facility: HOSPITAL | Age: 85
End: 2025-01-16
Attending: FAMILY MEDICINE
Payer: MEDICARE

## 2025-01-16 ENCOUNTER — PATIENT OUTREACH (OUTPATIENT)
Dept: ADMINISTRATIVE | Facility: OTHER | Age: 85
End: 2025-01-16
Payer: MEDICARE

## 2025-01-16 ENCOUNTER — OFFICE VISIT (OUTPATIENT)
Dept: PRIMARY CARE CLINIC | Facility: CLINIC | Age: 85
End: 2025-01-16
Payer: MEDICARE

## 2025-01-16 VITALS
HEART RATE: 68 BPM | TEMPERATURE: 98 F | OXYGEN SATURATION: 98 % | HEIGHT: 68 IN | BODY MASS INDEX: 21.64 KG/M2 | SYSTOLIC BLOOD PRESSURE: 140 MMHG | DIASTOLIC BLOOD PRESSURE: 60 MMHG | WEIGHT: 142.81 LBS

## 2025-01-16 DIAGNOSIS — I10 ESSENTIAL HYPERTENSION: ICD-10-CM

## 2025-01-16 DIAGNOSIS — E11.59 HYPERTENSION ASSOCIATED WITH TYPE 2 DIABETES MELLITUS: Chronic | ICD-10-CM

## 2025-01-16 DIAGNOSIS — E78.2 MIXED HYPERLIPIDEMIA: ICD-10-CM

## 2025-01-16 DIAGNOSIS — N18.31 STAGE 3A CHRONIC KIDNEY DISEASE: Chronic | ICD-10-CM

## 2025-01-16 DIAGNOSIS — E11.22 CKD STAGE 3 DUE TO TYPE 2 DIABETES MELLITUS: Primary | Chronic | ICD-10-CM

## 2025-01-16 DIAGNOSIS — E11.69 CONTROLLED TYPE 2 DIABETES MELLITUS WITH OTHER SPECIFIED COMPLICATION, WITHOUT LONG-TERM CURRENT USE OF INSULIN: Chronic | ICD-10-CM

## 2025-01-16 DIAGNOSIS — E11.42 DM TYPE 2 WITH DIABETIC PERIPHERAL NEUROPATHY: ICD-10-CM

## 2025-01-16 DIAGNOSIS — I10 ESSENTIAL HYPERTENSION: Chronic | ICD-10-CM

## 2025-01-16 DIAGNOSIS — N18.30 CKD STAGE 3 DUE TO TYPE 2 DIABETES MELLITUS: ICD-10-CM

## 2025-01-16 DIAGNOSIS — D50.8 OTHER IRON DEFICIENCY ANEMIA: Chronic | ICD-10-CM

## 2025-01-16 DIAGNOSIS — E11.42 DM TYPE 2 WITH DIABETIC PERIPHERAL NEUROPATHY: Chronic | ICD-10-CM

## 2025-01-16 DIAGNOSIS — D53.9 MACROCYTIC ANEMIA: ICD-10-CM

## 2025-01-16 DIAGNOSIS — E11.22 CKD STAGE 3 DUE TO TYPE 2 DIABETES MELLITUS: ICD-10-CM

## 2025-01-16 DIAGNOSIS — I15.2 HYPERTENSION ASSOCIATED WITH TYPE 2 DIABETES MELLITUS: Chronic | ICD-10-CM

## 2025-01-16 DIAGNOSIS — E55.9 VITAMIN D DEFICIENCY: Chronic | ICD-10-CM

## 2025-01-16 DIAGNOSIS — B35.4 TINEA CORPORIS: ICD-10-CM

## 2025-01-16 DIAGNOSIS — N18.30 CKD STAGE 3 DUE TO TYPE 2 DIABETES MELLITUS: Primary | Chronic | ICD-10-CM

## 2025-01-16 DIAGNOSIS — E11.9 ENCOUNTER FOR DIABETIC FOOT EXAM: ICD-10-CM

## 2025-01-16 LAB
ALBUMIN SERPL BCP-MCNC: 3.8 G/DL (ref 3.5–5.2)
ANION GAP SERPL CALC-SCNC: 9 MMOL/L (ref 8–16)
BUN SERPL-MCNC: 20 MG/DL (ref 8–23)
CALCIUM SERPL-MCNC: 10 MG/DL (ref 8.7–10.5)
CHLORIDE SERPL-SCNC: 109 MMOL/L (ref 95–110)
CHOLEST SERPL-MCNC: 148 MG/DL (ref 120–199)
CHOLEST/HDLC SERPL: 2.6 {RATIO} (ref 2–5)
CO2 SERPL-SCNC: 24 MMOL/L (ref 23–29)
CREAT SERPL-MCNC: 1.1 MG/DL (ref 0.5–1.4)
EST. GFR  (NO RACE VARIABLE): 49.2 ML/MIN/1.73 M^2
ESTIMATED AVG GLUCOSE: 160 MG/DL (ref 68–131)
GLUCOSE SERPL-MCNC: 112 MG/DL (ref 70–110)
HBA1C MFR BLD: 7.2 % (ref 4–5.6)
HDLC SERPL-MCNC: 56 MG/DL (ref 40–75)
HDLC SERPL: 37.8 % (ref 20–50)
LDLC SERPL CALC-MCNC: 79.4 MG/DL (ref 63–159)
NONHDLC SERPL-MCNC: 92 MG/DL
PHOSPHATE SERPL-MCNC: 3.5 MG/DL (ref 2.7–4.5)
POTASSIUM SERPL-SCNC: 4.4 MMOL/L (ref 3.5–5.1)
SODIUM SERPL-SCNC: 142 MMOL/L (ref 136–145)
TRIGL SERPL-MCNC: 63 MG/DL (ref 30–150)
VIT B12 SERPL-MCNC: 1474 PG/ML (ref 210–950)

## 2025-01-16 PROCEDURE — 1159F MED LIST DOCD IN RCRD: CPT | Mod: CPTII,S$GLB,, | Performed by: FAMILY MEDICINE

## 2025-01-16 PROCEDURE — 99999 PR PBB SHADOW E&M-EST. PATIENT-LVL V: CPT | Mod: PBBFAC,,, | Performed by: FAMILY MEDICINE

## 2025-01-16 PROCEDURE — 1126F AMNT PAIN NOTED NONE PRSNT: CPT | Mod: CPTII,S$GLB,, | Performed by: FAMILY MEDICINE

## 2025-01-16 PROCEDURE — 83036 HEMOGLOBIN GLYCOSYLATED A1C: CPT | Performed by: FAMILY MEDICINE

## 2025-01-16 PROCEDURE — 80061 LIPID PANEL: CPT | Performed by: FAMILY MEDICINE

## 2025-01-16 PROCEDURE — 82746 ASSAY OF FOLIC ACID SERUM: CPT | Performed by: FAMILY MEDICINE

## 2025-01-16 PROCEDURE — 3078F DIAST BP <80 MM HG: CPT | Mod: CPTII,S$GLB,, | Performed by: FAMILY MEDICINE

## 2025-01-16 PROCEDURE — 36415 COLL VENOUS BLD VENIPUNCTURE: CPT | Mod: PN | Performed by: FAMILY MEDICINE

## 2025-01-16 PROCEDURE — 1101F PT FALLS ASSESS-DOCD LE1/YR: CPT | Mod: CPTII,S$GLB,, | Performed by: FAMILY MEDICINE

## 2025-01-16 PROCEDURE — 80069 RENAL FUNCTION PANEL: CPT | Performed by: FAMILY MEDICINE

## 2025-01-16 PROCEDURE — 99215 OFFICE O/P EST HI 40 MIN: CPT | Mod: S$GLB,,, | Performed by: FAMILY MEDICINE

## 2025-01-16 PROCEDURE — 3288F FALL RISK ASSESSMENT DOCD: CPT | Mod: CPTII,S$GLB,, | Performed by: FAMILY MEDICINE

## 2025-01-16 PROCEDURE — 3077F SYST BP >= 140 MM HG: CPT | Mod: CPTII,S$GLB,, | Performed by: FAMILY MEDICINE

## 2025-01-16 PROCEDURE — 82607 VITAMIN B-12: CPT | Performed by: FAMILY MEDICINE

## 2025-01-16 RX ORDER — DOXYLAMINE SUCCINATE 25 MG
TABLET ORAL 2 TIMES DAILY
Qty: 106 G | Refills: 2 | Status: SHIPPED | OUTPATIENT
Start: 2025-01-16

## 2025-01-16 RX ORDER — FERROUS SULFATE 325(65) MG
325 TABLET ORAL
Qty: 90 TABLET | Refills: 3 | Status: SHIPPED | OUTPATIENT
Start: 2025-01-16 | End: 2026-01-16

## 2025-01-16 RX ORDER — GABAPENTIN 100 MG/1
100 CAPSULE ORAL 2 TIMES DAILY
Qty: 60 CAPSULE | Refills: 2 | Status: SHIPPED | OUTPATIENT
Start: 2025-01-16 | End: 2025-04-16

## 2025-01-16 RX ORDER — HYDRALAZINE HYDROCHLORIDE 25 MG/1
25 TABLET, FILM COATED ORAL 2 TIMES DAILY
Qty: 180 TABLET | Refills: 3 | Status: SHIPPED | OUTPATIENT
Start: 2025-01-16 | End: 2026-01-16

## 2025-01-16 RX ORDER — GLIMEPIRIDE 4 MG/1
4 TABLET ORAL
Qty: 90 TABLET | Refills: 3 | Status: SHIPPED | OUTPATIENT
Start: 2025-01-16 | End: 2026-01-16

## 2025-01-16 RX ORDER — LINAGLIPTIN 5 MG/1
5 TABLET, FILM COATED ORAL DAILY
Qty: 90 TABLET | Refills: 3 | Status: SHIPPED | OUTPATIENT
Start: 2025-01-16 | End: 2026-01-16

## 2025-01-16 RX ORDER — CLONIDINE 0.3 MG/24H
1 PATCH, EXTENDED RELEASE TRANSDERMAL
Qty: 12 PATCH | Refills: 3 | Status: SHIPPED | OUTPATIENT
Start: 2025-01-16 | End: 2026-01-16

## 2025-01-16 RX ORDER — OLMESARTAN MEDOXOMIL 40 MG/1
40 TABLET ORAL DAILY
Qty: 90 TABLET | Refills: 3 | Status: SHIPPED | OUTPATIENT
Start: 2025-01-16 | End: 2026-01-16

## 2025-01-16 RX ORDER — ERGOCALCIFEROL 1.25 MG/1
50000 CAPSULE ORAL WEEKLY
Qty: 12 CAPSULE | Refills: 3 | Status: SHIPPED | OUTPATIENT
Start: 2025-01-16 | End: 2026-01-16

## 2025-01-16 RX ORDER — AMLODIPINE BESYLATE 10 MG/1
10 TABLET ORAL DAILY
Qty: 90 TABLET | Refills: 3 | Status: SHIPPED | OUTPATIENT
Start: 2025-01-16 | End: 2026-01-16

## 2025-01-16 RX ORDER — ATORVASTATIN CALCIUM 20 MG/1
20 TABLET, FILM COATED ORAL NIGHTLY
Qty: 90 TABLET | Refills: 3 | Status: SHIPPED | OUTPATIENT
Start: 2025-01-16 | End: 2026-01-16

## 2025-01-16 NOTE — PROGRESS NOTES
CHW - Initial Contact    This Community Health Worker completed the Social Determinant of Health questionnaire with patient during clinic visit today.    Pt identified barriers of most importance are. None at this time.   Referrals to community agencies completed with patient consent outside of [facility] include. No outside referrals at this time.  Referrals were put through [facility] - no  Support and Services: None  Other information discussed the patient needs help with. No other information was discussed.   Follow up required: No   No future outreach task assigned

## 2025-01-16 NOTE — PROGRESS NOTES
Subjective:      Chief Complaint   Patient presents with    Other Misc     3 MONTH F/U/ med refills     Anemia    Hypertension     CKD, macrocytosis, DM, foot exam      Patient ID: Darshana Fortune is a 84 y.o. female.  History of Present Illness        LABS:  Cholesterol, A1C, and routine kidney function tests were performed recently. Hemoglobin was 10.8, indicating anemia, though iron levels were normal. Vitamin B12 levels need to be checked. She demonstrates limited understanding of anemia and vitamin B12, requesting clarification.    MEDICATIONS:  Her gabapentin prescription is limited to a maximum of three months due to its controlled substance classification. All other medications have been prescribed for one year.    BLOOD PRESSURE:  She has obtained a new blood pressure machine for home monitoring.    Protective Sensation (w/ 10 gram monofilament):  Right: decreased  Left: decreased    Visual Inspection:  Onychomycosis -  Bilateral and great toe bilaterally with fungi infection and several of the webs bilaterally    Pedal Pulses:   Right: Present  Left: Present    Posterior Tibialis Pulses:   Right:Present  Left: Present    ROS:  General: -fever, -chills, -fatigue, -weight gain, -weight loss  Eyes: -vision changes, -redness, -discharge  ENT: -ear pain, -nasal congestion, -sore throat  Cardiovascular: -chest pain, -palpitations, -lower extremity edema  Respiratory: -cough, -shortness of breath  Gastrointestinal: -abdominal pain, -nausea, -vomiting, -diarrhea, -constipation, -blood in stool  Genitourinary: -dysuria, -hematuria, -frequency  Musculoskeletal: -joint pain, -muscle pain  Skin: -rash, -lesion  Neurological: -headache, -dizziness, -numbness, -tingling  Psychiatric: -anxiety, -depression, -sleep difficulty       Darshana Fortune's allergies, medications, history, and problem list were updated as appropriate.    Answers submitted by the patient for this visit:  Review of Systems Questionnaire  (Submitted on 1/13/2025)  activity change: No  unexpected weight change: No  neck pain: No  hearing loss: No  rhinorrhea: No  trouble swallowing: No  eye discharge: No  visual disturbance: No  chest tightness: No  wheezing: No  chest pain: No  palpitations: No  blood in stool: No  constipation: No  vomiting: No  diarrhea: No  polydipsia: No  polyuria: No  difficulty urinating: No  hematuria: No  menstrual problem: No  dysuria: No  joint swelling: No  arthralgias: No  headaches: No  weakness: No  confusion: No  dysphoric mood: No      Past Medical History:   Diagnosis Date    Cerebral aneurysm without rupture 1998    [place]    Diabetes mellitus     Hypertension     Malignant neoplasm of left kidney 2017    removed     Family History   Problem Relation Name Age of Onset    No Known Problems Father      No Known Problems Mother      No Known Problems Maternal Aunt      No Known Problems Maternal Uncle      No Known Problems Paternal Aunt      No Known Problems Paternal Uncle      No Known Problems Paternal Grandmother      No Known Problems Paternal Grandfather      No Known Problems Maternal Grandmother      No Known Problems Maternal Grandfather       Social History     Socioeconomic History    Marital status:    Tobacco Use    Smoking status: Never    Smokeless tobacco: Never   Substance and Sexual Activity    Alcohol use: No    Drug use: No    Sexual activity: Not Currently     Partners: Male     Social Drivers of Health     Financial Resource Strain: Low Risk  (1/16/2025)    Overall Financial Resource Strain (CARDIA)     Difficulty of Paying Living Expenses: Not hard at all   Food Insecurity: No Food Insecurity (1/16/2025)    Hunger Vital Sign     Worried About Running Out of Food in the Last Year: Never true     Ran Out of Food in the Last Year: Never true   Transportation Needs: No Transportation Needs (1/16/2025)    PRAPARE - Transportation     Lack of Transportation  (Medical): No     Lack of Transportation (Non-Medical): No   Physical Activity: Insufficiently Active (1/16/2025)    Exercise Vital Sign     Days of Exercise per Week: 5 days     Minutes of Exercise per Session: 20 min   Stress: No Stress Concern Present (1/16/2025)    [employer] - Occupational Stress Questionnaire     Feeling of Stress : Not at all   Housing Stability: Low Risk  (1/16/2025)    Housing Stability Vital Sign     Unable to Pay for Housing in the Last Year: No     Homeless in the Last Year: No     Outpatient Encounter Medications as of 1/16/2025   Medication Sig Dispense Refill    ammonium lactate (LAC-HYDRIN) 12 % lotion Apply topically as needed for Dry Skin. 400 mL 3    [DISCONTINUED] amLODIPine (NORVASC) 10 MG tablet Take 1 tablet (10 mg total) by mouth once daily. 90 tablet 3    [DISCONTINUED] atorvastatin (LIPITOR) 20 MG tablet Take 1 tablet (20 mg total) by mouth every evening. 90 tablet 3    [DISCONTINUED] cloNIDine 0.3 mg/24 hr td ptwk (CATAPRES) 0.3 mg/24 hr Place 1 patch onto the skin every 7 days. 12 patch 3    [DISCONTINUED] ergocalciferol (ERGOCALCIFEROL) 50,000 unit Cap Take 1 capsule (50,000 Units total) by mouth once a week. 12 capsule 3    [DISCONTINUED] ferrous sulfate (IRON) 325 mg (65 mg iron) Tab tablet Take 1 tablet (325 mg total) by mouth daily with breakfast. 90 tablet 3    [DISCONTINUED] glimepiride (AMARYL) 4 MG tablet Take 1 tablet (4 mg total) by mouth before breakfast. 90 tablet 3    [DISCONTINUED] hydrALAZINE (APRESOLINE) 25 MG tablet Take 1 tablet (25 mg total) by mouth 2 (two) times daily. 180 tablet 3    [DISCONTINUED] linaGLIPtin (TRADJENTA) 5 mg Tab tablet Take 1 tablet (5 mg total) by mouth once daily. 90 tablet 3    [DISCONTINUED] olmesartan (BENICAR) 40 MG tablet Take 1 tablet (40 mg total) by mouth once daily. 90 tablet 3    amLODIPine (NORVASC) 10 MG tablet Take 1 tablet (10 mg total) by mouth once daily. 90 tablet 3    atorvastatin  "(LIPITOR) 20 MG tablet Take 1 tablet (20 mg total) by mouth every evening. 90 tablet 3    cloNIDine 0.3 mg/24 hr td ptwk (CATAPRES) 0.3 mg/24 hr Place 1 patch onto the skin every 7 days. 12 patch 3    ergocalciferol (ERGOCALCIFEROL) 50,000 unit Cap Take 1 capsule (50,000 Units total) by mouth once a week. 12 capsule 3    ferrous sulfate (IRON) 325 mg (65 mg iron) Tab tablet Take 1 tablet (325 mg total) by mouth daily with breakfast. 90 tablet 3    gabapentin (NEURONTIN) 100 MG capsule Take 1 capsule (100 mg total) by mouth 2 (two) times daily. 60 capsule 2    glimepiride (AMARYL) 4 MG tablet Take 1 tablet (4 mg total) by mouth before breakfast. 90 tablet 3    hydrALAZINE (APRESOLINE) 25 MG tablet Take 1 tablet (25 mg total) by mouth 2 (two) times daily. 180 tablet 3    linaGLIPtin (TRADJENTA) 5 mg Tab tablet Take 1 tablet (5 mg total) by mouth once daily. 90 tablet 3    miconazole (MICOTIN) 2 % cream Apply topically 2 (two) times daily. 106 g 2    olmesartan (BENICAR) 40 MG tablet Take 1 tablet (40 mg total) by mouth once daily. 90 tablet 3    [DISCONTINUED] gabapentin (NEURONTIN) 100 MG capsule Take 1 capsule (100 mg total) by mouth 2 (two) times daily. 60 capsule 5     No facility-administered encounter medications on file as of 1/16/2025.          Objective:      BP (!) 140/60   Pulse 68   Temp 97.9 °F (36.6 °C)   Ht 5' 8.4" (1.737 m)   Wt 64.8 kg (142 lb 12.8 oz)   SpO2 98%   BMI 21.46 kg/m²   Physical Exam  Vitals and nursing note reviewed.   Constitutional:       General: She is not in acute distress.  HENT:      Head: Normocephalic and atraumatic.   Cardiovascular:      Rate and Rhythm: Normal rate and regular rhythm.      Pulses: Normal pulses.      Heart sounds: No murmur heard.  Pulmonary:      Effort: Pulmonary effort is normal. No respiratory distress.      Breath sounds: Normal breath sounds. No wheezing or rhonchi.   Musculoskeletal:         General: No swelling or deformity.      Right lower " leg: No edema.      Left lower leg: No edema.   Feet:      Right foot:      Toenail Condition: Right toenails are abnormally thick. Fungal disease present.     Left foot:      Toenail Condition: Left toenails are abnormally thick. Fungal disease present.     Comments: Excoriation tot he webs cw athlete's foot  Neurological:      General: No focal deficit present.      Mental Status: She is alert.      Cranial Nerves: No cranial nerve deficit.   Psychiatric:         Behavior: Behavior normal.         Thought Content: Thought content normal.        Physical Exam             Results for orders placed or performed in visit on 08/26/24   Hemoglobin A1C    Collection Time: 08/26/24  9:10 AM   Result Value Ref Range    Hemoglobin A1C 7.1 (H) 4.0 - 5.6 %    Estimated Avg Glucose 157 (H) 68 - 131 mg/dL   CBC Auto Differential    Collection Time: 08/26/24  9:10 AM   Result Value Ref Range    WBC 7.99 3.90 - 12.70 K/uL    RBC 3.57 (L) 4.00 - 5.40 M/uL    Hemoglobin 10.7 (L) 12.0 - 16.0 g/dL    Hematocrit 35.5 (L) 37.0 - 48.5 %    MCV 99 (H) 82 - 98 fL    MCH 30.0 27.0 - 31.0 pg    MCHC 30.1 (L) 32.0 - 36.0 g/dL    RDW 13.4 11.5 - 14.5 %    Platelets 259 150 - 450 K/uL    MPV 9.7 9.2 - 12.9 fL    Immature Granulocytes 0.3 0.0 - 0.5 %    Gran # (ANC) 5.9 1.8 - 7.7 K/uL    Immature Grans (Abs) 0.02 0.00 - 0.04 K/uL    Lymph # 1.5 1.0 - 4.8 K/uL    Mono # 0.5 0.3 - 1.0 K/uL    Eos # 0.1 0.0 - 0.5 K/uL    Baso # 0.02 0.00 - 0.20 K/uL    nRBC 0 0 /100 WBC    Gran % 74.1 (H) 38.0 - 73.0 %    Lymph % 18.6 18.0 - 48.0 %    Mono % 5.8 4.0 - 15.0 %    Eosinophil % 0.9 0.0 - 8.0 %    Basophil % 0.3 0.0 - 1.9 %    Differential Method Automated    RENAL FUNCTION PANEL    Collection Time: 08/26/24  9:10 AM   Result Value Ref Range    Glucose 172 (H) 70 - 110 mg/dL    Sodium 137 136 - 145 mmol/L    Potassium 4.7 3.5 - 5.1 mmol/L    Chloride 105 95 - 110 mmol/L    CO2 19 (L) 23 - 29 mmol/L    BUN 27 (H) 8 - 23 mg/dL    Calcium 10.4 8.7 - 10.5  mg/dL    Creatinine 1.3 0.5 - 1.4 mg/dL    Albumin 3.9 3.5 - 5.2 g/dL    Phosphorus 3.9 2.7 - 4.5 mg/dL    eGFR 40.5 (A) >60 mL/min/1.73 m^2    Anion Gap 13 8 - 16 mmol/L   FERRITIN    Collection Time: 08/26/24  9:10 AM   Result Value Ref Range    Ferritin 217 20.0 - 300.0 ng/mL     Assessment/Plan:         1. CKD stage 3 due to type 2 diabetes mellitus    2. Mixed hyperlipidemia    3. Essential hypertension    4. Macrocytic anemia    5. Stage 3a chronic kidney disease    6. Encounter for diabetic foot exam    7. Tinea corporis    8. Controlled type 2 diabetes mellitus with other specified complication, without long-term current use of insulin    9. Hypertension associated with type 2 diabetes mellitus    10. Other iron deficiency anemia    11. Vitamin D deficiency    12. DM type 2 with diabetic peripheral neuropathy      CKD stage 3 due to type 2 diabetes mellitus  Comments:  lab for monitor  Orders:  -     RENAL FUNCTION PANEL; Future; Expected date: 01/16/2025  -     Microalbumin/Creatinine Ratio, Urine; Future; Expected date: 01/16/2025    Mixed hyperlipidemia  -     Lipid Panel; Future; Expected date: 01/16/2025  -     atorvastatin (LIPITOR) 20 MG tablet; Take 1 tablet (20 mg total) by mouth every evening.  Dispense: 90 tablet; Refill: 3    Essential hypertension  Comments:  not quite controlled but expected to go down as medication kicks in this morning.  Orders:  -     RENAL FUNCTION PANEL; Future; Expected date: 01/16/2025  -     Lipid Panel; Future; Expected date: 01/16/2025  -     Microalbumin/Creatinine Ratio, Urine; Future; Expected date: 01/16/2025  -     amLODIPine (NORVASC) 10 MG tablet; Take 1 tablet (10 mg total) by mouth once daily.  Dispense: 90 tablet; Refill: 3  -     cloNIDine 0.3 mg/24 hr td ptwk (CATAPRES) 0.3 mg/24 hr; Place 1 patch onto the skin every 7 days.  Dispense: 12 patch; Refill: 3  -     hydrALAZINE (APRESOLINE) 25 MG tablet; Take 1 tablet (25 mg total) by mouth 2 (two) times  daily.  Dispense: 180 tablet; Refill: 3  -     olmesartan (BENICAR) 40 MG tablet; Take 1 tablet (40 mg total) by mouth once daily.  Dispense: 90 tablet; Refill: 3    Macrocytic anemia  Comments:  New, needs further eval for possible etiology  Orders:  -     VITAMIN B12; Future; Expected date: 01/16/2025  -     Folate; Future; Expected date: 01/16/2025    Stage 3a chronic kidney disease  Comments:  stable, update lab, avoid NSAIDs    Encounter for diabetic foot exam  -      DIABETES FOOT EXAM    Tinea corporis  Comments:  new, see tx  Orders:  -     miconazole (MICOTIN) 2 % cream; Apply topically 2 (two) times daily.  Dispense: 106 g; Refill: 2    Controlled type 2 diabetes mellitus with other specified complication, without long-term current use of insulin  Comments:  controlled, no change in med  Orders:  -     glimepiride (AMARYL) 4 MG tablet; Take 1 tablet (4 mg total) by mouth before breakfast.  Dispense: 90 tablet; Refill: 3    Hypertension associated with type 2 diabetes mellitus  -     linaGLIPtin (TRADJENTA) 5 mg Tab tablet; Take 1 tablet (5 mg total) by mouth once daily.  Dispense: 90 tablet; Refill: 3    Other iron deficiency anemia  Comments:  will replete and recheck status on her next visit  Orders:  -     ferrous sulfate (IRON) 325 mg (65 mg iron) Tab tablet; Take 1 tablet (325 mg total) by mouth daily with breakfast.  Dispense: 90 tablet; Refill: 3    Vitamin D deficiency  Comments:  due to ckd 3, controlled  Orders:  -     ergocalciferol (ERGOCALCIFEROL) 50,000 unit Cap; Take 1 capsule (50,000 Units total) by mouth once a week.  Dispense: 12 capsule; Refill: 3    DM type 2 with diabetic peripheral neuropathy  Comments:  with restless leg too, take Gabapentin 2090mg at night, she denies sedation, cont to monitor renal function. A1c today.  Orders:  -     RENAL FUNCTION PANEL; Future; Expected date: 01/16/2025  -     Lipid Panel; Future; Expected date: 01/16/2025  -     Microalbumin/Creatinine Ratio,  Urine; Future; Expected date: 01/16/2025  -     Hemoglobin A1C; Future; Expected date: 01/16/2025  -     gabapentin (NEURONTIN) 100 MG capsule; Take 1 capsule (100 mg total) by mouth 2 (two) times daily.  Dispense: 60 capsule; Refill: 2    Other orders  -     Cancel: Diabetic Eye Screening Photo; Future      MALIGNANT NEOPLASM OF LEFT KIDNEY, EXCEPT RENAL PELVIS:  - Monitor kidney function every 3 months.    CHRONIC KIDNEY DISEASE (CKD) STAGE G3B/A1, MODERATELY DECREASED GFR BETWEEN 30-44 ML/MIN/1.73 SQUARE METER AND ALBUMINURIA CREATININE RATIO LESS THAN 30 MG/G:  - Assess kidney function as part of routine monitoring every 3 months.  - Ordered labs to check kidney function, cholesterol, A1C, and vitamin B12 levels.    DIABETES:  - Monitor A1C every 3 months.  - Last A1C was 7.1, below the target of 7.5.  - Performed diabetic foot exam, revealing some loss of sensation in toes, indicating possible neuropathy.  - Noted diabetic complication affecting the patient's toenails with necrotic tissue.  - Explained toenail issue is related to diabetic complications and needs prolonged use of topicals due to poor response.  - Educated patient on proper foot care, including drying between toes after bathing to prevent moisture-related complications.    DIABETIC NEUROPATHY:  - Continued gabapentin for neuropathy management.    HYPERTENSION:  - Noted elevated blood pressure during the visit, possibly due to white coat syndrome  - Decided not to add new antihypertensive medication at this time, borderline number 140/60  - Instructed patient to monitor blood pressure at home and report results to the office.    ANEMIA:  - Evaluated anemia status; hemoglobin is 10.8, which is borderline low.  - Noted that iron levels are adequate, ruling out iron deficiency anemia.    FUNGAL TOENAIL INFECTION:  - Evaluated toenail showing signs of fungal infection with necrotic tissue.  - Prescribed new topical medication for application between  toes.  - Advised using vinegar soak followed by Vicks VapoRub application for toenail treatment.  FOLLOW UP:  -- Follow up in 3 months.            I have reviewed all of the patient's clinical history available in care everywhere and Epic and have utilized this in my evaluation and management recommendations today.      Treatment options and alternatives were discussed with the patient. Patient was given ample time to ask questions. All questions were answered. Voices understanding and acceptance of this advice. Will call back if any further questions or concerns.         This note was generated with the assistance of ambient listening technology. Verbal consent was obtained by the patient and accompanying visitor(s) for the recording of patient appointment to facilitate this note. I attest to having reviewed and edited the generated note for accuracy, though some syntax or spelling errors may persist. Please contact the author of this note for any clarification.         MEDICAL DECISION MAKING: Moderate to high complexity.  Overall, the multiple problems listed are of moderate to high severity that may impact quality of life and activities of daily living. Side effects of medications, treatment plan as well as options and alternatives reviewed and discussed with patient. There was counseling of patient concerning these issues.  Total time spent in face to face counseling and coordination of care  is 45 minutes      Antionette Son MD  [facility],

## 2025-01-16 NOTE — PATIENT INSTRUCTIONS
"Diabetes and Diet   The Basics   Written by the doctors and editors at [facility]   Why is diet important in diabetes? -- Diet is important because it is part of diabetes treatment. Many people need to change what they eat and how much they eat to help treat their diabetes. It is important for people to treat their diabetes so that they:  Keep their blood sugar at or near a normal level  Prevent long-term problems, such as heart or kidney problems, that can happen in people with diabetes  Changing your diet can also help treat obesity, high blood pressure, and high cholesterol. These conditions can affect people with diabetes and can lead to future problems, such as heart attacks or strokes.  Who will work with me to change my diet? -- Your doctor or nurse will work with you to make a food plan to change your diet. They might also recommend that you work with a "dietitian." A dietitian is an expert on food and eating.  Do I need to eat at the same times every day? -- When and how often you should eat depends, in part, on the diabetes medicines you take. For example:  People who take about the same amount of insulin at the same time each day (called a "fixed regimen") should eat meals at the same times. This is also true for people who take pills that increase insulin levels, such as sulfonylureas. Eating meals at the same time every day helps prevent low blood sugar.  People who adjust the dose and timing of their insulin each day (called a "flexible regimen") do not always have to eat meals at the same time. That's because they can time their insulin dose for before they plan to eat, and also adjust the dose for how much they plan to eat.  People who take medicines that don't usually cause low blood sugar, such as metformin, don't have to eat meals at the same time every day.  What do I need to think about when planning what to eat? -- Our bodies break down the food we eat into small pieces called carbohydrates, " "proteins, and fats.  When planning what to eat, people with diabetes need to think about:  Carbohydrates (or "carbs") - Carbohydrates, which are sugars that our bodies use for energy, can raise a person's blood sugar level. Your doctor, nurse, or dietitian will tell you how many carbohydrates you should eat at each meal or snack. Foods that have carbohydrates include:  Bread, pasta, and rice  Vegetables and fruits  Dairy foods  Foods and drinks with added sugar  It is best to get your carbohydrates from fruits, vegetables, whole grains, and low-fat milk. It is best to avoid drinks with added sugar, like soda, juices, and sports drinks.   Protein - Your doctor, nurse, or dietitian will tell you how much protein you should eat each day. It is best to eat lean meats, fish, eggs, beans, peas, soy products, nuts, and seeds.  Fats - The type of fat you eat is more important than the amount of fat. "Saturated" and "trans" fats can increase your risk for heart problems, like a heart attack.  Foods that have saturated fats include meat, butter, cheese, and ice cream.  Foods that have trans fats include processed food with "partially hydrogenated oils" on the ingredient list. This may include fried foods, store bought cookies, muffins, pies, and cakes.  "Monounsaturated" and "polyunsaturated" fats are better for you. Foods with these types of fat include fish, avocado, olive oil, and nuts.  Calories - People need to eat a certain amount of calories each day to keep their weight the same. People who are overweight and want to lose weight need to eat fewer calories each day.  Fiber - Eating foods with a lot of fiber can help control a person's blood sugar level. Foods that have a lot of fiber include apples, green beans, peas, beans, lentils, nuts, oatmeal, and whole grains.  Salt - People who have high blood pressure should not eat foods that contain a lot of salt (also called sodium). People with high blood pressure should " also eat healthy foods, such as fruits, vegetables, and low-fat dairy foods.  Alcohol - Having more than 1 drink (for women) or 2 drinks (for men) a day can raise blood sugar levels. Also, drinks that have fruit juice or soda in them can raise blood sugar levels.  What can I do if I need to lose weight? -- If you need to lose weight, you can:  Exercise - Try to get at least 30 minutes of physical activity a day, most days of the week. Even gentle exercise, like walking, is good for your health. Some people with diabetes need to change their medicine dose before they exercise. They might also need to check their blood sugar levels before and after exercising.  Eat fewer calories - Your doctor, nurse, or dietitian can tell you how many calories you should eat each day in order to lose weight.  If you are worried about your weight, size, or shape, talk with your doctor, nurse, or dietitian. They can help you make changes to improve your health.  Can I eat the same foods as my family? -- Yes. You do not need to eat special foods if you have diabetes. You and your family can eat the same foods. Changing your diet is mostly about eating healthy foods and not eating too much.  What are the other parts of diabetes treatment? -- Besides changing your diet, the other parts of diabetes treatment are:  Exercise  Medicines  Some people with diabetes need to learn how to match their diet and exercise with their medicine dose. For example, people who use insulin might need to choose the dose of insulin they give themselves. To choose their dose, they need to think about:  What they plan to eat at the next meal  How much exercise they plan to do  What their blood sugar level is  If the diet and exercise do not match the medicine dose, a person's blood sugar level can get too low or too high. Blood sugar levels that are too low or too high can cause problems.  All topics are updated as new evidence becomes available and our peer  review process is complete.  This topic retrieved from [employer] on: Sep 21, 2021.  Topic 40963 Version 7.0  Release: 29.4.2 - C29.263  © 2021 UpToDate, Inc. and/or its affiliates. All rights reserved.  Consumer Information Use and Disclaimer   This information is not specific medical advice and does not replace information you receive from your health care provider. This is only a brief summary of general information. It does NOT include all information about conditions, illnesses, injuries, tests, procedures, treatments, therapies, discharge instructions or life-style choices that may apply to you. You must talk with your health care provider for complete information about your health and treatment options. This information should not be used to decide whether or not to accept your health care provider's advice, instructions or recommendations. Only your health care provider has the knowledge and training to provide advice that is right for you. The use of this information is governed by the [employer] End User License Agreement, available at [email or web address] use of [employer] content is governed by the [employer] Terms of Use. ©2021 UpToDate, Inc. All rights reserved.  Copyright  DASH Diet   About this topic   DASH stands for Dietary Approaches to Stop Hypertension. The DASH diet may help you lower blood pressure. It may also help keep you from getting high blood pressure. You will eat less fat and more fiber on the DASH diet.  This diet gives you more minerals that fight high blood pressure. Some nutrients in this diet are:  Potassium ? Acts to help you get rid of salt. This may help to lower blood pressure.  Calcium ? Makes blood vessels and muscles work the right way  Magnesium - Helps blood vessels relax  Fiber ? Helps you feel full. It also helps digestion.  What will the results be?   The DASH diet may help you:  Lower your blood pressure and cholesterol  Lower  your risk for cancer, heart disease, heart attack, and stroke. It may also lower your risk for heart failure, kidney stones, and diabetes.  Lose weight or keep a healthy weight  What lifestyle changes are needed?   Add regular exercise to get the most help from this diet.  Try to lower stress. Find ways to relax.  Stop smoking. Avoid secondhand smoke.  Limit alcohol intake.  What changes to diet are needed?   Know about poor eating habits. Then, you can fix them as you work with the program.  This diet encourages fruits and vegetables, whole grains, lean meats, healthy fats, and low-fat or fat-free dairy products.  This diet is lower in saturated fats, trans-fats, cholesterol, added sugars, and sodium.  Who should use this diet?   This eating plan is good for the whole family. It is also good for people with high blood pressure and those at risk for high blood pressure.  What foods are good to eat?   Grains: Try to eat 6 to 8 servings of whole grain, high fiber foods each day. These are bread, cereals, brown rice, or pasta.  Fruits and vegetables: Eat 4 to 5 servings each day. Try to pick many kinds and colors. Fresh or frozen are best. Look for low sodium or salt-free if you choose canned.  Dairy: Try to eat 2 to 3 servings of fat free and low fat milk products each day.  Lean meats, poultry, and seafood: Try to eat 6 servings or less of lean meats, poultry, and seafood each day. Try to choose more low fat or lean meats like chicken and turkey. Eat less red meat. Eat more fish instead.  Nuts, seeds, and legumes (dry beans and peas): Try to eat 4 to 5 servings each week. Try to pick nuts such as almonds and walnuts, sunflower seeds, peanut butter, soy beans, lentils, kidney beans, and split peas.  Fats and oils: Try to eat 2 to 3 servings of fats and oils each day. Eat good fats found in fish, nuts, and avocados. Try using olive oil or vegetable oils such as canola oil. Other good oils to try are corn, safflower,  sunflower, or soybean oils. Use low-sodium and low-fat salad dressing and mayonnaise.  Condiments: Pepper, herbs, spices, vinegar, lemon or lime juices are great for seasoning. Be careful to choose low-sodium or salt-free products if you use broths, soups, or soy sauce.  Sweets: Try to eat less than 5 servings each week. Choose low-fat and trans fat-free desserts. These are things like fruit flavored gelatin, sorbet, jelly beans, rivera crackers, animal crackers, low-fat fig bars, and yomi snaps. Eat fruit to satisfy your desire for sweets.     What foods should be limited or avoided?   Grains: Salted breads, rolls, crackers, quick breads, self-rising flours, biscuit mixes, regular bread crumbs

## 2025-01-17 LAB — FOLATE SERPL-MCNC: 39.9 NG/ML (ref 4–24)

## 2025-01-19 NOTE — PROGRESS NOTES
Please contact the patient and let them know that their results were fine and do not require any change in treatment.    Kidney function improved from 40 to 49. Still in the CKD 3 range. Continue to avoid all NSAIDs and drink water. A1c is controlled- no change in medication.

## 2025-01-30 ENCOUNTER — TELEPHONE (OUTPATIENT)
Dept: PRIMARY CARE CLINIC | Facility: CLINIC | Age: 85
End: 2025-01-30
Payer: MEDICARE

## 2025-01-31 ENCOUNTER — PATIENT MESSAGE (OUTPATIENT)
Dept: PRIMARY CARE CLINIC | Facility: CLINIC | Age: 85
End: 2025-01-31
Payer: MEDICARE

## 2025-01-31 ENCOUNTER — TELEPHONE (OUTPATIENT)
Dept: PRIMARY CARE CLINIC | Facility: CLINIC | Age: 85
End: 2025-01-31
Payer: MEDICARE

## 2025-01-31 NOTE — TELEPHONE ENCOUNTER
Spoke Addy with due to the cream being an OTC, insurance isn't covering  and pharmacy sent pt a message to pay cash and they are asking for her to approve. Which is 7 tubes. She states it has nothing to do with quantity insurance will not cover medication. Patient has to respond to pharmacy if they want to pay, or what quantity would they want to pay for.

## 2025-02-02 DIAGNOSIS — B35.4 TINEA CORPORIS: Primary | ICD-10-CM

## 2025-02-02 RX ORDER — CICLOPIROX OLAMINE 7.7 MG/G
CREAM TOPICAL 2 TIMES DAILY
Qty: 90 G | Refills: 1 | Status: SHIPPED | OUTPATIENT
Start: 2025-02-02 | End: 2025-05-03

## 2025-02-03 ENCOUNTER — TELEPHONE (OUTPATIENT)
Dept: PRIMARY CARE CLINIC | Facility: CLINIC | Age: 85
End: 2025-02-03
Payer: MEDICARE

## 2025-02-03 VITALS — DIASTOLIC BLOOD PRESSURE: 62 MMHG | SYSTOLIC BLOOD PRESSURE: 132 MMHG

## 2025-03-27 ENCOUNTER — OFFICE VISIT (OUTPATIENT)
Dept: PRIMARY CARE CLINIC | Facility: CLINIC | Age: 85
End: 2025-03-27
Payer: MEDICARE

## 2025-03-27 ENCOUNTER — LAB VISIT (OUTPATIENT)
Dept: LAB | Facility: HOSPITAL | Age: 85
End: 2025-03-27
Attending: FAMILY MEDICINE
Payer: MEDICARE

## 2025-03-27 VITALS
OXYGEN SATURATION: 98 % | BODY MASS INDEX: 21.19 KG/M2 | HEART RATE: 72 BPM | DIASTOLIC BLOOD PRESSURE: 62 MMHG | WEIGHT: 139.81 LBS | SYSTOLIC BLOOD PRESSURE: 124 MMHG | HEIGHT: 68 IN | TEMPERATURE: 97 F

## 2025-03-27 DIAGNOSIS — C64.2 MALIGNANT NEOPLASM OF LEFT KIDNEY, EXCEPT RENAL PELVIS: ICD-10-CM

## 2025-03-27 DIAGNOSIS — D50.8 OTHER IRON DEFICIENCY ANEMIA: Primary | Chronic | ICD-10-CM

## 2025-03-27 DIAGNOSIS — M19.042 PRIMARY OSTEOARTHRITIS OF BOTH HANDS: Chronic | ICD-10-CM

## 2025-03-27 DIAGNOSIS — N18.31 STAGE 3A CHRONIC KIDNEY DISEASE: Chronic | ICD-10-CM

## 2025-03-27 DIAGNOSIS — M19.041 PRIMARY OSTEOARTHRITIS OF BOTH HANDS: Chronic | ICD-10-CM

## 2025-03-27 DIAGNOSIS — N18.32 CHRONIC KIDNEY DISEASE (CKD) STAGE G3B/A1, MODERATELY DECREASED GLOMERULAR FILTRATION RATE (GFR) BETWEEN 30-44 ML/MIN/1.73 SQUARE METER AND ALBUMINURIA CREATININE RATIO LESS THAN 30 MG/G: ICD-10-CM

## 2025-03-27 DIAGNOSIS — N18.31 STAGE 3A CHRONIC KIDNEY DISEASE: ICD-10-CM

## 2025-03-27 DIAGNOSIS — E11.69 CONTROLLED TYPE 2 DIABETES MELLITUS WITH OTHER SPECIFIED COMPLICATION, WITHOUT LONG-TERM CURRENT USE OF INSULIN: ICD-10-CM

## 2025-03-27 DIAGNOSIS — N18.32 STAGE 3B CHRONIC KIDNEY DISEASE: ICD-10-CM

## 2025-03-27 DIAGNOSIS — I10 PRIMARY HYPERTENSION: ICD-10-CM

## 2025-03-27 PROCEDURE — 3074F SYST BP LT 130 MM HG: CPT | Mod: CPTII,S$GLB,, | Performed by: FAMILY MEDICINE

## 2025-03-27 PROCEDURE — 87086 URINE CULTURE/COLONY COUNT: CPT

## 2025-03-27 PROCEDURE — 82570 ASSAY OF URINE CREATININE: CPT | Mod: 59

## 2025-03-27 PROCEDURE — 99999 PR PBB SHADOW E&M-EST. PATIENT-LVL IV: CPT | Mod: PBBFAC,,, | Performed by: FAMILY MEDICINE

## 2025-03-27 PROCEDURE — 1101F PT FALLS ASSESS-DOCD LE1/YR: CPT | Mod: CPTII,S$GLB,, | Performed by: FAMILY MEDICINE

## 2025-03-27 PROCEDURE — 1160F RVW MEDS BY RX/DR IN RCRD: CPT | Mod: CPTII,S$GLB,, | Performed by: FAMILY MEDICINE

## 2025-03-27 PROCEDURE — 81003 URINALYSIS AUTO W/O SCOPE: CPT

## 2025-03-27 PROCEDURE — 3078F DIAST BP <80 MM HG: CPT | Mod: CPTII,S$GLB,, | Performed by: FAMILY MEDICINE

## 2025-03-27 PROCEDURE — 99214 OFFICE O/P EST MOD 30 MIN: CPT | Mod: S$GLB,,, | Performed by: FAMILY MEDICINE

## 2025-03-27 PROCEDURE — 84156 ASSAY OF PROTEIN URINE: CPT

## 2025-03-27 PROCEDURE — 1159F MED LIST DOCD IN RCRD: CPT | Mod: CPTII,S$GLB,, | Performed by: FAMILY MEDICINE

## 2025-03-27 PROCEDURE — 3288F FALL RISK ASSESSMENT DOCD: CPT | Mod: CPTII,S$GLB,, | Performed by: FAMILY MEDICINE

## 2025-03-27 PROCEDURE — 1126F AMNT PAIN NOTED NONE PRSNT: CPT | Mod: CPTII,S$GLB,, | Performed by: FAMILY MEDICINE

## 2025-03-27 NOTE — PATIENT INSTRUCTIONS
"Diabetes and Diet   The Basics   Written by the doctors and editors at [facility]   Why is diet important in diabetes? -- Diet is important because it is part of diabetes treatment. Many people need to change what they eat and how much they eat to help treat their diabetes. It is important for people to treat their diabetes so that they:  Keep their blood sugar at or near a normal level  Prevent long-term problems, such as heart or kidney problems, that can happen in people with diabetes  Changing your diet can also help treat obesity, high blood pressure, and high cholesterol. These conditions can affect people with diabetes and can lead to future problems, such as heart attacks or strokes.  Who will work with me to change my diet? -- Your doctor or nurse will work with you to make a food plan to change your diet. They might also recommend that you work with a "dietitian." A dietitian is an expert on food and eating.  Do I need to eat at the same times every day? -- When and how often you should eat depends, in part, on the diabetes medicines you take. For example:  People who take about the same amount of insulin at the same time each day (called a "fixed regimen") should eat meals at the same times. This is also true for people who take pills that increase insulin levels, such as sulfonylureas. Eating meals at the same time every day helps prevent low blood sugar.  People who adjust the dose and timing of their insulin each day (called a "flexible regimen") do not always have to eat meals at the same time. That's because they can time their insulin dose for before they plan to eat, and also adjust the dose for how much they plan to eat.  People who take medicines that don't usually cause low blood sugar, such as metformin, don't have to eat meals at the same time every day.  What do I need to think about when planning what to eat? -- Our bodies break down the food we eat into small pieces called carbohydrates, " "proteins, and fats.  When planning what to eat, people with diabetes need to think about:  Carbohydrates (or "carbs") - Carbohydrates, which are sugars that our bodies use for energy, can raise a person's blood sugar level. Your doctor, nurse, or dietitian will tell you how many carbohydrates you should eat at each meal or snack. Foods that have carbohydrates include:  Bread, pasta, and rice  Vegetables and fruits  Dairy foods  Foods and drinks with added sugar  It is best to get your carbohydrates from fruits, vegetables, whole grains, and low-fat milk. It is best to avoid drinks with added sugar, like soda, juices, and sports drinks.   Protein - Your doctor, nurse, or dietitian will tell you how much protein you should eat each day. It is best to eat lean meats, fish, eggs, beans, peas, soy products, nuts, and seeds.  Fats - The type of fat you eat is more important than the amount of fat. "Saturated" and "trans" fats can increase your risk for heart problems, like a heart attack.  Foods that have saturated fats include meat, butter, cheese, and ice cream.  Foods that have trans fats include processed food with "partially hydrogenated oils" on the ingredient list. This may include fried foods, store bought cookies, muffins, pies, and cakes.  "Monounsaturated" and "polyunsaturated" fats are better for you. Foods with these types of fat include fish, avocado, olive oil, and nuts.  Calories - People need to eat a certain amount of calories each day to keep their weight the same. People who are overweight and want to lose weight need to eat fewer calories each day.  Fiber - Eating foods with a lot of fiber can help control a person's blood sugar level. Foods that have a lot of fiber include apples, green beans, peas, beans, lentils, nuts, oatmeal, and whole grains.  Salt - People who have high blood pressure should not eat foods that contain a lot of salt (also called sodium). People with high blood pressure should " also eat healthy foods, such as fruits, vegetables, and low-fat dairy foods.  Alcohol - Having more than 1 drink (for women) or 2 drinks (for men) a day can raise blood sugar levels. Also, drinks that have fruit juice or soda in them can raise blood sugar levels.  What can I do if I need to lose weight? -- If you need to lose weight, you can:  Exercise - Try to get at least 30 minutes of physical activity a day, most days of the week. Even gentle exercise, like walking, is good for your health. Some people with diabetes need to change their medicine dose before they exercise. They might also need to check their blood sugar levels before and after exercising.  Eat fewer calories - Your doctor, nurse, or dietitian can tell you how many calories you should eat each day in order to lose weight.  If you are worried about your weight, size, or shape, talk with your doctor, nurse, or dietitian. They can help you make changes to improve your health.  Can I eat the same foods as my family? -- Yes. You do not need to eat special foods if you have diabetes. You and your family can eat the same foods. Changing your diet is mostly about eating healthy foods and not eating too much.  What are the other parts of diabetes treatment? -- Besides changing your diet, the other parts of diabetes treatment are:  Exercise  Medicines  Some people with diabetes need to learn how to match their diet and exercise with their medicine dose. For example, people who use insulin might need to choose the dose of insulin they give themselves. To choose their dose, they need to think about:  What they plan to eat at the next meal  How much exercise they plan to do  What their blood sugar level is  If the diet and exercise do not match the medicine dose, a person's blood sugar level can get too low or too high. Blood sugar levels that are too low or too high can cause problems.  All topics are updated as new evidence becomes available and our peer  review process is complete.  This topic retrieved from [employer] on: Sep 21, 2021.  Topic 55280 Version 7.0  Release: 29.4.2 - C29.263  © 2021 UpToDate, Inc. and/or its affiliates. All rights reserved.  Consumer Information Use and Disclaimer   This information is not specific medical advice and does not replace information you receive from your health care provider. This is only a brief summary of general information. It does NOT include all information about conditions, illnesses, injuries, tests, procedures, treatments, therapies, discharge instructions or life-style choices that may apply to you. You must talk with your health care provider for complete information about your health and treatment options. This information should not be used to decide whether or not to accept your health care provider's advice, instructions or recommendations. Only your health care provider has the knowledge and training to provide advice that is right for you. The use of this information is governed by the [employer] End User License Agreement, available at https://[email or web address].[employer]/en/[email or web address]/about/mundo.The use of [employer] content is governed by the [employer] Terms of Use. ©2021 UpToDate, Inc. All rights reserved.  Copyright

## 2025-03-27 NOTE — PROGRESS NOTES
Subjective:      Chief Complaint   Patient presents with    Follow-up    Diabetes Mellitus    Hypertension     Osteoarthritis-bilateral hands      Patient ID: Darshana Fortune is a 84 y.o. female.  History of Present Illness    CHIEF COMPLAINT:  Darshana presents today for follow up.  Brought in by her daughter  MUSCULOSKELETAL: Osteoarthritis  She reports shoulder pain which is relieved with use of a massager. She has arthritis in her fingers with associated stiffness, managed with nightly topical pain medication and wearing gloves.    SLEEP:  She reports intermittent sleep difficulties and occasionally naps during the day, which may be affecting her nighttime sleep. When unable to sleep at night, she remains seated in her chair.    ENT:  She recently obtained hearing aids from audiologist following evaluation at [facility]. Prior to hearing aid prescription, significant earwax buildup was found in bilateral ears, with one ear more severely affected.    MEDICATIONS:  She switched from iron pills to Beets supplements approximately three days ago due to severe constipation caused by the iron pills.       ROS:  General: -fever, -chills, -fatigue, -weight gain, -weight loss, +sleep disturbances  Eyes: -vision changes, -redness, -discharge  ENT: -ear pain, -nasal congestion, -sore throat, +hearing loss, +difficulty hearing  Cardiovascular: -chest pain, -palpitations, -lower extremity edema  Respiratory: -cough, -shortness of breath  Gastrointestinal: -abdominal pain, -nausea, -vomiting, -diarrhea, -constipation, -blood in stool  Genitourinary: -dysuria, -hematuria, -frequency  Musculoskeletal: -joint pain, +muscle pain, +joint stiffness, +pain with movement  Skin: -rash, -lesion  Neurological: -headache, -dizziness, -numbness, -tingling  Psychiatric: -anxiety, -depression, -sleep difficulty       Darshana Fortune's allergies, medications, history, and problem list were updated as appropriate.  Past Medical History:  "  Diagnosis Date    Cerebral aneurysm without rupture 1998    went to [facility] in [place]    Diabetes mellitus     Hypertension     Malignant neoplasm of left kidney 2017    removed     Family History   Problem Relation Name Age of Onset    No Known Problems Father      No Known Problems Mother      No Known Problems Maternal Aunt      No Known Problems Maternal Uncle      No Known Problems Paternal Aunt      No Known Problems Paternal Uncle      No Known Problems Paternal Grandmother      No Known Problems Paternal Grandfather      No Known Problems Maternal Grandmother      No Known Problems Maternal Grandfather       Social History[1]  Encounter Medications[2]       Objective:      /62   Pulse 72   Temp 96.8 °F (36 °C)   Ht 5' 8.4" (1.737 m)   Wt 63.4 kg (139 lb 12.8 oz)   SpO2 98%   BMI 21.01 kg/m²   Physical Exam  Vitals and nursing note reviewed.   Constitutional:       General: She is not in acute distress.  HENT:      Head: Normocephalic and atraumatic.   Cardiovascular:      Rate and Rhythm: Normal rate and regular rhythm.      Pulses: Normal pulses.      Heart sounds: No murmur heard.  Pulmonary:      Effort: Pulmonary effort is normal. No respiratory distress.      Breath sounds: Normal breath sounds. No wheezing or rhonchi.   Musculoskeletal:         General: No swelling or deformity.      Right lower leg: No edema.      Left lower leg: No edema.   Neurological:      General: No focal deficit present.      Mental Status: She is alert.      Cranial Nerves: No cranial nerve deficit.   Psychiatric:         Behavior: Behavior normal.         Thought Content: Thought content normal.        Physical Exam    Vitals: A1C: 7.1.        Results for orders placed or performed in visit on 01/16/25   RENAL FUNCTION PANEL    Collection Time: 01/16/25  9:09 AM   Result Value Ref Range    Glucose 112 (H) 70 - 110 mg/dL    Sodium 142 136 - 145 mmol/L    Potassium 4.4 3.5 - 5.1 mmol/L    Chloride " 109 95 - 110 mmol/L    CO2 24 23 - 29 mmol/L    BUN 20 8 - 23 mg/dL    Calcium 10.0 8.7 - 10.5 mg/dL    Creatinine 1.1 0.5 - 1.4 mg/dL    Albumin 3.8 3.5 - 5.2 g/dL    Phosphorus 3.5 2.7 - 4.5 mg/dL    eGFR 49.2 (A) >60 mL/min/1.73 m^2    Anion Gap 9 8 - 16 mmol/L   Lipid Panel    Collection Time: 01/16/25  9:09 AM   Result Value Ref Range    Cholesterol 148 120 - 199 mg/dL    Triglycerides 63 30 - 150 mg/dL    HDL 56 40 - 75 mg/dL    LDL Cholesterol 79.4 63.0 - 159.0 mg/dL    HDL/Cholesterol Ratio 37.8 20.0 - 50.0 %    Total Cholesterol/HDL Ratio 2.6 2.0 - 5.0    Non-HDL Cholesterol 92 mg/dL   VITAMIN B12    Collection Time: 01/16/25  9:09 AM   Result Value Ref Range    Vitamin B-12 1474 (H) 210 - 950 pg/mL   Folate    Collection Time: 01/16/25  9:09 AM   Result Value Ref Range    Folate 39.9 (H) 4.0 - 24.0 ng/mL   Hemoglobin A1C    Collection Time: 01/16/25  9:09 AM   Result Value Ref Range    Hemoglobin A1C 7.2 (H) 4.0 - 5.6 %    Estimated Avg Glucose 160 (H) 68 - 131 mg/dL     Assessment/Plan:         1. Other iron deficiency anemia    2. Controlled type 2 diabetes mellitus with other specified complication, without long-term current use of insulin    3. Stage 3a chronic kidney disease    4. Primary osteoarthritis of both hands    5. Malignant neoplasm of left kidney, except renal pelvis    6. Chronic kidney disease (CKD) stage G3b/A1, moderately decreased glomerular filtration rate (GFR) between 30-44 mL/min/1.73 square meter and albuminuria creatinine ratio less than 30 mg/g      Other iron deficiency anemia  Comments:  -now on beets supplement, allow to take for 3 mons and repeat iron profile to see effectiveness.    Controlled type 2 diabetes mellitus with other specified complication, without long-term current use of insulin  -     Microalbumin/Creatinine Ratio, Urine; Future; Expected date: 03/27/2025    Stage 3a chronic kidney disease  Comments:  stable GFR of 49.2 and sCR 1.1. Avoid all  NSAIDs  Orders:  -     Microalbumin/Creatinine Ratio, Urine; Future; Expected date: 03/27/2025    Primary osteoarthritis of both hands  Comments:  -controlled, with PIP joint hypertrophy bilaterally, cont conservative therapy.    Malignant neoplasm of left kidney, except renal pelvis  Comments:  history of    Chronic kidney disease (CKD) stage G3b/A1, moderately decreased glomerular filtration rate (GFR) between 30-44 mL/min/1.73 square meter and albuminuria creatinine ratio less than 30 mg/g  Comments:  resolved. see new GFR          BP remains stable.  Renal function stable based on labs.  A1C level currently at 7.1, within target range of below 7. 5    PLAN SUMMARY:   Ordered urinalysis to check protein levels for renal function assessment   Continue current management plan for blood pressure and diabetes   Prescribed ear drops for cerumen management   Noted switch to iron supplement (Beets) for constipation   Advised against daytime napping to improve night sleep   Continue using massager and self-management techniques for shoulder pain   Schedule follow-up A1C test in 6 months   Schedule follow-up visit in 3 months    CKD   Monitor kidney function and stability.   Ordered urinalysis to check protein levels for renal function assessment.   Evaluated blood pressure (within normal range) and observed no edema in lower extremities.   Current assessment: kidney function is stable.   Schedule follow-up visit in 3 months.  TYPE 2 DIABETES MELLITUS   Monitor glucose levels (A1C) every 3-6 months.   Last A1C result of 7.1 is slightly above the target of below 7, but glucose is stable and close to target range.   Explained importance of maintaining A1C level below 7.   Continue current management plan and schedule follow-up A1C test in 6 months.    PRIMARY OSTEOARTHRITIS,    Monitor ongoing arthritis symptoms, including stiffness and pain in fingers.   Evaluated and confirmed arthritis in the patient's fingers.   Advised  patient to continue self-management techniques, including topical pain relief application and wearing gloves at night.   iron supplement (Beats) to alleviate constipation.                I have reviewed all of the patient's clinical history available in care everywhere and Epic and have utilized this in my evaluation and management recommendations today.      Treatment options and alternatives were discussed with the patient. Patient was given ample time to ask questions. All questions were answered. Voices understanding and acceptance of this advice. Will call back if any further questions or concerns.     This note was generated with the assistance of ambient listening technology. Verbal consent was obtained by the patient and accompanying visitor(s) for the recording of patient appointment to facilitate this note. I attest to having reviewed and edited the generated note for accuracy, though some syntax or spelling errors may persist. Please contact the author of this note for any clarification.            Antionette Son MD  [facility],            [1]   Social History  Socioeconomic History    Marital status:    Tobacco Use    Smoking status: Never    Smokeless tobacco: Never   Substance and Sexual Activity    Alcohol use: No    Drug use: No    Sexual activity: Not Currently     Partners: Male     Social Drivers of Health     Financial Resource Strain: Low Risk  (1/16/2025)    Overall Financial Resource Strain (CARDIA)     Difficulty of Paying Living Expenses: Not hard at all   Food Insecurity: No Food Insecurity (1/16/2025)    Hunger Vital Sign     Worried About Running Out of Food in the Last Year: Never true     Ran Out of Food in the Last Year: Never true   Transportation Needs: No Transportation Needs (1/16/2025)    PRAPARE - Transportation     Lack of Transportation (Medical): No     Lack of Transportation (Non-Medical): No   Physical Activity: Insufficiently Active  (1/16/2025)    Exercise Vital Sign     Days of Exercise per Week: 5 days     Minutes of Exercise per Session: 20 min   Stress: No Stress Concern Present (1/16/2025)    [employer] [place] [employer] - Occupational Stress Questionnaire     Feeling of Stress : Not at all   Housing Stability: Unknown (1/16/2025)    Housing Stability Vital Sign     Unable to Pay for Housing in the Last Year: No     Homeless in the Last Year: No   [2]   Outpatient Encounter Medications as of 3/27/2025   Medication Sig Dispense Refill    amLODIPine (NORVASC) 10 MG tablet Take 1 tablet (10 mg total) by mouth once daily. 90 tablet 3    ammonium lactate (LAC-HYDRIN) 12 % lotion Apply topically as needed for Dry Skin. 400 mL 3    atorvastatin (LIPITOR) 20 MG tablet Take 1 tablet (20 mg total) by mouth every evening. 90 tablet 3    ciclopirox (LOPROX) 0.77 % Crea Apply topically 2 (two) times daily. 90 g 1    cloNIDine 0.3 mg/24 hr td ptwk (CATAPRES) 0.3 mg/24 hr Place 1 patch onto the skin every 7 days. 12 patch 3    ergocalciferol (ERGOCALCIFEROL) 50,000 unit Cap Take 1 capsule (50,000 Units total) by mouth once a week. 12 capsule 3    ferrous sulfate (IRON) 325 mg (65 mg iron) Tab tablet Take 1 tablet (325 mg total) by mouth daily with breakfast. 90 tablet 3    gabapentin (NEURONTIN) 100 MG capsule Take 1 capsule (100 mg total) by mouth 2 (two) times daily. 60 capsule 2    glimepiride (AMARYL) 4 MG tablet Take 1 tablet (4 mg total) by mouth before breakfast. 90 tablet 3    hydrALAZINE (APRESOLINE) 25 MG tablet Take 1 tablet (25 mg total) by mouth 2 (two) times daily. 180 tablet 3    linaGLIPtin (TRADJENTA) 5 mg Tab tablet Take 1 tablet (5 mg total) by mouth once daily. 90 tablet 3    miconazole (MICOTIN) 2 % cream Apply topically 2 (two) times daily. 106 g 2    olmesartan (BENICAR) 40 MG tablet Take 1 tablet (40 mg total) by mouth once daily. 90 tablet 3     No facility-administered encounter medications on file as of 3/27/2025.

## 2025-03-28 LAB
ALBUMIN/CREAT UR: 26.1 UG/MG
BACTERIA #/AREA URNS AUTO: ABNORMAL /HPF
BILIRUB UR QL STRIP.AUTO: NEGATIVE
CLARITY UR: CLEAR
COLOR UR AUTO: YELLOW
CREAT UR-MCNC: 44 MG/DL (ref 15–325)
CREAT UR-MCNC: 46 MG/DL (ref 15–325)
GLUCOSE UR QL STRIP: NEGATIVE
HGB UR QL STRIP: NEGATIVE
KETONES UR QL STRIP: NEGATIVE
LEUKOCYTE ESTERASE UR QL STRIP: ABNORMAL
MICROALBUMIN UR-MCNC: 12 UG/ML (ref ?–5000)
MICROSCOPIC COMMENT: ABNORMAL
NITRITE UR QL STRIP: POSITIVE
PH UR STRIP: 6 [PH]
PROT UR QL STRIP: NEGATIVE
PROT UR-MCNC: <7 MG/DL
PROT/CREAT UR: NORMAL MG/G{CREAT}
RBC #/AREA URNS AUTO: 1 /HPF (ref 0–4)
SP GR UR STRIP: 1.01
SQUAMOUS #/AREA URNS AUTO: 1 /HPF
UROBILINOGEN UR STRIP-ACNC: NEGATIVE EU/DL
WBC #/AREA URNS AUTO: 12 /HPF (ref 0–5)

## 2025-04-05 ENCOUNTER — RESULTS FOLLOW-UP (OUTPATIENT)
Dept: PRIMARY CARE CLINIC | Facility: CLINIC | Age: 85
End: 2025-04-05

## 2025-04-10 ENCOUNTER — TELEPHONE (OUTPATIENT)
Dept: PRIMARY CARE CLINIC | Facility: CLINIC | Age: 85
End: 2025-04-10
Payer: MEDICARE

## 2025-04-10 NOTE — TELEPHONE ENCOUNTER
I have attempted without success to contact this patient by phone to discuss lab results.       ----- Message from Antionette Son MD sent at 4/5/2025 12:04 PM CDT -----  We use urine microalbumin to screen for kidney damage.  Your result is normal.  ----- Message -----  From: Lab, Background User  Sent: 3/28/2025   5:43 AM CDT  To: Antionette Son MD

## 2025-06-09 DIAGNOSIS — E11.42 DM TYPE 2 WITH DIABETIC PERIPHERAL NEUROPATHY: Chronic | ICD-10-CM

## 2025-06-09 RX ORDER — GABAPENTIN 100 MG/1
100 CAPSULE ORAL 2 TIMES DAILY
Qty: 60 CAPSULE | Refills: 0 | Status: SHIPPED | OUTPATIENT
Start: 2025-06-09

## 2025-06-09 NOTE — TELEPHONE ENCOUNTER
Care Due:                  Date            Visit Type   Department     Provider  --------------------------------------------------------------------------------                                ESTABLISHED   [facility]  Last Visit: 03-      PATIENT      CARE           Antionette Son                              EP -                              PRIMARY      [facility]  Next Visit: 06-      CARE ([facility])   CARE           Antionette Son                                                            Last  Test          Frequency    Reason                     Performed    Due Date  --------------------------------------------------------------------------------    CBC.........  12 months..  hydrALAZINE..............  08- 08-    CMP.........  12 months..  atorvastatin.............  Not Found    Overdue    HBA1C.......  6 months...  glimepiride, linaGLIPtin.  01- 07-    Vitamin D...  12 months..  ergocalciferol...........  02- 02-    [facility] Embedded Care Due Messages. Reference number: [id number].   6/09/2025 6:49:40 AM CDT

## 2025-06-11 ENCOUNTER — OFFICE VISIT (OUTPATIENT)
Dept: OTOLARYNGOLOGY | Facility: CLINIC | Age: 85
End: 2025-06-11
Payer: MEDICARE

## 2025-06-11 VITALS — WEIGHT: 138.88 LBS | BODY MASS INDEX: 21.05 KG/M2 | HEIGHT: 68 IN

## 2025-06-11 DIAGNOSIS — H90.3 SENSORINEURAL HEARING LOSS (SNHL) OF BOTH EARS: ICD-10-CM

## 2025-06-11 DIAGNOSIS — H92.02 OTALGIA OF LEFT EAR: Primary | ICD-10-CM

## 2025-06-11 DIAGNOSIS — H61.21 IMPACTED CERUMEN OF RIGHT EAR: ICD-10-CM

## 2025-06-11 PROCEDURE — 99999 PR PBB SHADOW E&M-EST. PATIENT-LVL III: CPT | Mod: PBBFAC,,, | Performed by: PHYSICIAN ASSISTANT

## 2025-06-11 NOTE — PROGRESS NOTES
Subjective     Patient ID: Darshana Fortune is a 84 y.o. female.    Chief Complaint: Otalgia (Pt is coming in today for L ear pain that started 2 weeks ago.)    Patient is a pleasant 84 year old female here to see me today for evaluation of left ear pain, on and off over the past 2 weeks.  Denies any alleviating or exacerbating factors.  No ear drainage or recent changes in hearing.  She has known sensorineural hearing loss and wears hearing aids AU.  She has occasional tinnitus AU.  She has not had any recent dental work.  She wears dentures which she reports are loosely fitting.  She is having to use adhesive frequently to hold them in place.  Denies sore throat, dysphagia or any swelling in face or neck.      Review of Systems   Constitutional: Negative.  Negative for fever.   HENT:  Positive for ear pain (left), hearing loss and tinnitus. Negative for ear discharge, sore throat and trouble swallowing.    Eyes:  Positive for itching.   Respiratory: Negative.     Cardiovascular: Negative.    Gastrointestinal: Negative.    Endocrine: Negative.    Genitourinary: Negative.    Musculoskeletal: Negative.    Integumentary:  Negative.   Neurological: Negative.    Hematological: Negative.    Psychiatric/Behavioral: Negative.            Objective     Physical Exam  Constitutional:       Appearance: Normal appearance.   HENT:      Head: Normocephalic and atraumatic.      Jaw: No trismus or pain on movement.      Salivary Glands: Right salivary gland is not diffusely enlarged. Left salivary gland is not diffusely enlarged.      Right Ear: External ear normal. Decreased hearing noted. No drainage. There is impacted cerumen (removal described below).      Left Ear: External ear normal. Decreased hearing noted. No drainage. There is no impacted cerumen.      Nose: Nose normal. No congestion or rhinorrhea.      Mouth/Throat:      Mouth: Mucous membranes are moist.      Dentition: Has dentures.      Pharynx: Oropharynx is clear.    Eyes:      Pupils: Pupils are equal, round, and reactive to light.   Pulmonary:      Effort: Pulmonary effort is normal.   Neurological:      General: No focal deficit present.      Mental Status: She is alert.   Psychiatric:         Behavior: Behavior is cooperative.       Procedure Note    CHIEF COMPLAINT:  Cerumen Impaction    Description:  The patient was seated in an exam chair.  An ear speculum was placed in the right EAC and was examined under the microscope.  Alligator forceps were used to remove a large cerumen impaction.  The tympanic membrane was visualized and was normal in appearance.   The patient tolerated the procedure well.       Assessment and Plan     1. Otalgia of left ear    2. Impacted cerumen of right ear    3. Sensorineural hearing loss (SNHL) of both ears        No cerumen impaction, signs of OE or DESTIN in the LEFT ear today.  She admits her dentures are not fitting properly.  We had a long discussion regarding the underlying pathology of temporomandibular joint dysfunction (TMD) as the cause of ear pain.  We further discussed conservative measures to treat TMD including avoiding gum and other foods that require lots of chewing, warm compresses, and scheduled antinflammatories.  If the pain persists, the patient will then schedule an appointment with a dentist for further evaluation.     Cerumen impaction AD:  Removed today without difficulty.  I would recommend the use of a wax softening drop, either over the counter Debrox or mineral oil, on a weekly basis.  I also instructed the patient to avoid Qtips.    Sensorineural hearing loss:  Continue with hearing aids and RTC as needed with any new or worsening symptoms.             No follow-ups on file.

## 2025-08-03 ENCOUNTER — PATIENT MESSAGE (OUTPATIENT)
Dept: PRIMARY CARE CLINIC | Facility: CLINIC | Age: 85
End: 2025-08-03
Payer: MEDICARE

## 2025-08-05 ENCOUNTER — OFFICE VISIT (OUTPATIENT)
Dept: PRIMARY CARE CLINIC | Facility: CLINIC | Age: 85
End: 2025-08-05
Payer: MEDICARE

## 2025-08-05 ENCOUNTER — PATIENT OUTREACH (OUTPATIENT)
Dept: ADMINISTRATIVE | Facility: OTHER | Age: 85
End: 2025-08-05
Payer: MEDICARE

## 2025-08-05 VITALS
DIASTOLIC BLOOD PRESSURE: 66 MMHG | BODY MASS INDEX: 21.12 KG/M2 | OXYGEN SATURATION: 99 % | HEART RATE: 102 BPM | SYSTOLIC BLOOD PRESSURE: 116 MMHG | TEMPERATURE: 98 F | HEIGHT: 68 IN

## 2025-08-05 DIAGNOSIS — I15.2 HYPERTENSION ASSOCIATED WITH TYPE 2 DIABETES MELLITUS: Chronic | ICD-10-CM

## 2025-08-05 DIAGNOSIS — E11.59 HYPERTENSION ASSOCIATED WITH TYPE 2 DIABETES MELLITUS: Chronic | ICD-10-CM

## 2025-08-05 DIAGNOSIS — E11.69 CONTROLLED TYPE 2 DIABETES MELLITUS WITH OTHER SPECIFIED COMPLICATION, WITHOUT LONG-TERM CURRENT USE OF INSULIN: Chronic | ICD-10-CM

## 2025-08-05 DIAGNOSIS — Z74.3 REQUIRES CONTINUOUS SUPERVISION FOR ACTIVITIES OF DAILY LIVING (ADL): Chronic | ICD-10-CM

## 2025-08-05 DIAGNOSIS — Z09 HOSPITAL DISCHARGE FOLLOW-UP: Primary | ICD-10-CM

## 2025-08-05 DIAGNOSIS — N30.00 ACUTE CYSTITIS WITHOUT HEMATURIA: ICD-10-CM

## 2025-08-05 DIAGNOSIS — I69.30 HISTORY OF CVA WITH RESIDUAL DEFICIT: Chronic | ICD-10-CM

## 2025-08-05 DIAGNOSIS — E78.2 MIXED HYPERLIPIDEMIA: ICD-10-CM

## 2025-08-05 DIAGNOSIS — Z79.01 ANTICOAGULATION THERAPY CONTINUED UPON DISCHARGE: Chronic | ICD-10-CM

## 2025-08-05 DIAGNOSIS — E55.9 VITAMIN D DEFICIENCY: Chronic | ICD-10-CM

## 2025-08-05 DIAGNOSIS — D50.8 OTHER IRON DEFICIENCY ANEMIA: Chronic | ICD-10-CM

## 2025-08-05 DIAGNOSIS — G47.00 INSOMNIA, CONTROLLED: Chronic | ICD-10-CM

## 2025-08-05 DIAGNOSIS — E11.42 DM TYPE 2 WITH DIABETIC PERIPHERAL NEUROPATHY: Chronic | ICD-10-CM

## 2025-08-05 DIAGNOSIS — I10 ESSENTIAL HYPERTENSION: Chronic | ICD-10-CM

## 2025-08-05 PROCEDURE — 1160F RVW MEDS BY RX/DR IN RCRD: CPT | Mod: CPTII,S$GLB,, | Performed by: FAMILY MEDICINE

## 2025-08-05 PROCEDURE — 3078F DIAST BP <80 MM HG: CPT | Mod: CPTII,S$GLB,, | Performed by: FAMILY MEDICINE

## 2025-08-05 PROCEDURE — 3074F SYST BP LT 130 MM HG: CPT | Mod: CPTII,S$GLB,, | Performed by: FAMILY MEDICINE

## 2025-08-05 PROCEDURE — 1126F AMNT PAIN NOTED NONE PRSNT: CPT | Mod: CPTII,S$GLB,, | Performed by: FAMILY MEDICINE

## 2025-08-05 PROCEDURE — 1159F MED LIST DOCD IN RCRD: CPT | Mod: CPTII,S$GLB,, | Performed by: FAMILY MEDICINE

## 2025-08-05 PROCEDURE — 99999 PR PBB SHADOW E&M-EST. PATIENT-LVL III: CPT | Mod: PBBFAC,,, | Performed by: FAMILY MEDICINE

## 2025-08-05 PROCEDURE — 99215 OFFICE O/P EST HI 40 MIN: CPT | Mod: S$GLB,,, | Performed by: FAMILY MEDICINE

## 2025-08-05 RX ORDER — ERGOCALCIFEROL 1.25 MG/1
50000 CAPSULE ORAL WEEKLY
Qty: 12 CAPSULE | Refills: 3 | Status: SHIPPED | OUTPATIENT
Start: 2025-08-05 | End: 2026-08-05

## 2025-08-05 RX ORDER — CLONIDINE 0.3 MG/24H
1 PATCH, EXTENDED RELEASE TRANSDERMAL
COMMUNITY
End: 2025-08-05 | Stop reason: SDUPTHER

## 2025-08-05 RX ORDER — OLMESARTAN MEDOXOMIL 20 MG/1
20 TABLET ORAL DAILY
Qty: 90 TABLET | Refills: 3 | Status: SHIPPED | OUTPATIENT
Start: 2025-08-05 | End: 2026-08-05

## 2025-08-05 RX ORDER — ATORVASTATIN CALCIUM 40 MG/1
40 TABLET, FILM COATED ORAL NIGHTLY
COMMUNITY
Start: 2025-06-20 | End: 2025-08-05 | Stop reason: SDUPTHER

## 2025-08-05 RX ORDER — GLIMEPIRIDE 4 MG/1
4 TABLET ORAL EVERY MORNING
COMMUNITY
End: 2025-08-05 | Stop reason: SDUPTHER

## 2025-08-05 RX ORDER — AMLODIPINE BESYLATE 2.5 MG/1
2.5 TABLET ORAL DAILY
Qty: 90 TABLET | Refills: 3 | Status: SHIPPED | OUTPATIENT
Start: 2025-08-05 | End: 2026-08-05

## 2025-08-05 RX ORDER — GLIMEPIRIDE 4 MG/1
4 TABLET ORAL
Qty: 90 TABLET | Refills: 3 | Status: SHIPPED | OUTPATIENT
Start: 2025-08-05 | End: 2026-08-05

## 2025-08-05 RX ORDER — ASPIRIN 81 MG/1
81 TABLET ORAL DAILY
Qty: 90 TABLET | Refills: 3 | Status: SHIPPED | OUTPATIENT
Start: 2025-08-05

## 2025-08-05 RX ORDER — MIRTAZAPINE 7.5 MG/1
7.5 TABLET, FILM COATED ORAL NIGHTLY
Qty: 90 TABLET | Refills: 2 | Status: SHIPPED | OUTPATIENT
Start: 2025-08-05

## 2025-08-05 RX ORDER — ATORVASTATIN CALCIUM 40 MG/1
40 TABLET, FILM COATED ORAL NIGHTLY
Qty: 90 TABLET | Refills: 3 | Status: SHIPPED | OUTPATIENT
Start: 2025-08-05 | End: 2026-08-05

## 2025-08-05 RX ORDER — NAPROXEN SODIUM 220 MG/1
81 TABLET, FILM COATED ORAL EVERY MORNING
COMMUNITY
Start: 2025-06-21 | End: 2025-08-05 | Stop reason: SDUPTHER

## 2025-08-05 RX ORDER — CLONIDINE 0.3 MG/24H
1 PATCH, EXTENDED RELEASE TRANSDERMAL
Qty: 12 PATCH | Refills: 3 | Status: SHIPPED | OUTPATIENT
Start: 2025-08-05 | End: 2026-08-05

## 2025-08-05 RX ORDER — GABAPENTIN 100 MG/1
CAPSULE ORAL
Qty: 90 CAPSULE | Refills: 2 | Status: SHIPPED | OUTPATIENT
Start: 2025-08-05

## 2025-08-05 RX ORDER — ASPIRIN 81 MG/1
81 TABLET ORAL
COMMUNITY
Start: 2025-07-10 | End: 2025-08-05 | Stop reason: SDUPTHER

## 2025-08-05 RX ORDER — SULFAMETHOXAZOLE AND TRIMETHOPRIM 800; 160 MG/1; MG/1
1 TABLET ORAL 2 TIMES DAILY
Qty: 6 TABLET | Refills: 0 | Status: SHIPPED | OUTPATIENT
Start: 2025-08-05 | End: 2025-08-08

## 2025-08-05 RX ORDER — FERROUS SULFATE 325(65) MG
325 TABLET ORAL
Qty: 90 TABLET | Refills: 3 | Status: SHIPPED | OUTPATIENT
Start: 2025-08-05 | End: 2026-08-05

## 2025-08-05 RX ORDER — MIRTAZAPINE 7.5 MG/1
7.5 TABLET, FILM COATED ORAL NIGHTLY
COMMUNITY
Start: 2025-07-10 | End: 2025-08-05 | Stop reason: SDUPTHER

## 2025-08-05 RX ORDER — CLOPIDOGREL BISULFATE 75 MG/1
75 TABLET ORAL DAILY
Qty: 30 TABLET | Refills: 0 | Status: SHIPPED | OUTPATIENT
Start: 2025-08-15

## 2025-08-05 RX ORDER — CLOPIDOGREL BISULFATE 75 MG/1
75 TABLET ORAL
COMMUNITY
End: 2025-08-05 | Stop reason: ALTCHOICE

## 2025-08-05 NOTE — PROGRESS NOTES
CHW - Initial Contact    This Community Health Worker completed the Social Determinant of Health questionnaire with patient during clinic visit today.    Pt identified barriers of most importance are none at this time.  Referrals to community agencies completed with patient consent outside of [facility] include. No outside referrals at this time.  Referrals were put through [facility] - no  Support and Services: None  Other information discussed the patient needs help with. No other information was discussed at this time.   Follow up required: No  No future outreach task assigned    Patient do not need any help at this time.

## 2025-08-05 NOTE — PROGRESS NOTES
Subjective:      Chief Complaint   Patient presents with    Other [facility]     Stroke 6/14/25-has [facility] PT and OCCUPATION, AND [facility]ERAPY, HAS HEALTH NURSE 2X A WEEK   Current possible UTI- reoccurent 3x   MED REFILL         Patient ID: Darshana Fortune is a 84 y.o. female.  History of Present Illness    Darshana presents today for follow up after stroke and rehabilitation.    She experienced a left-sided stroke on June 14, 1998, affecting the thalamus and occipital region. The stroke initially caused complete memory loss, including inability to recall personal details, and significant cognitive impairment with confusion of family members. She has demonstrated gradual improvement in memory and cognitive function. Right upper ext weakness resulted from the stroke. She has a history of aneurysm in 1998.    She requires 24-hour supervision with 5 hours of daily assistance provided by insurance. She ambulates with a walker and can perform self-care activities such as bathing and feeding with supervision. This represents significant improvement from her previous inability to walk or feed independently. She experiences right-sided weakness affecting hand function, particularly with making a fist. She participates in physical therapy, including hand exercises such as ball squeezing to improve mobility and strength.    She reports recurrent urinary tract infections, currently experiencing her third UTI since the stroke.     She describes leg neuropathic pain that is particularly severe at night, affecting her sleep quality. She reports vision problems in her right eye, potentially related to her previous stroke.    She has diabetes with current A1C of 7.5 and a documented history of arthritis.    Current medications include Gabapentin (one tablet morning, two tablets night), Remeron for depression and sleep, Amlodipine as needed, and Olmesartan 20mg. Hydralazine was discontinued due to low blood pressure (116/66). She  has a history of iron supplementation causing significant constipation.      ROS:  General: -fever, -chills, -fatigue, -weight gain, -weight loss  Eyes: -vision changes, -redness, -discharge  ENT: -ear pain, -nasal congestion, -sore throat  Cardiovascular: -chest pain, -palpitations, -lower extremity edema  Respiratory: -cough, -shortness of breath  Gastrointestinal: -abdominal pain, -nausea, -vomiting, -diarrhea, +constipation, -blood in stool  Genitourinary: -dysuria, -hematuria, -frequency  Musculoskeletal: -joint pain, -muscle pain, +nerve pain, +limb pain, +muscle weakness, +limb swelling  Skin: -rash, -lesion  Neurological: -headache, -dizziness, -numbness, -tingling, +confusion or disorientation, +weakness  Psychiatric: -anxiety, -depression, -sleep difficulty       Transitional Care Note    Family and/or Caretaker present at visit?  Yes.  Diagnostic tests reviewed/disposition: No diagnosic tests pending after this hospitalization.  Disease/illness education: CVA of the medial left occipital lobe, the left thalamus, and within the left medial temporal lobe with residual deficit on 06/14/25 including memory loss, confusion and now wheelchair dependent. Medication changes has been reviewed. Family reports that she may have UTI. Per daughter, She was recently hospitalized for a stroke. She was discharged from the hospital on 6/20 and went to inpatient rehab until 7/10. Were back at home and just been trying to get things together with the sitters because she needs 24 hour care. Some of her medications have been changed and also Im pretty sure she has another UTI.    Home health/community services discussion/referrals: Patient has home health established at discharge from rehab.   Establishment or re-establishment of referral orders for community resources: No other necessary community resources.   Discussion with other health care providers: No discussion with other health care providers necessary.       "  Darshana Fortune's allergies, medications, history, and problem list were updated as appropriate.  Past Medical History:   Diagnosis Date    Cerebral aneurysm without rupture 1998    went to [facility] in [place]    Diabetes mellitus     Hypertension     Malignant neoplasm of left kidney 2017    removed    Stroke      Family History   Problem Relation Name Age of Onset    No Known Problems Father      No Known Problems Mother      No Known Problems Maternal Aunt      No Known Problems Maternal Uncle      No Known Problems Paternal Aunt      No Known Problems Paternal Uncle      No Known Problems Paternal Grandmother      No Known Problems Paternal Grandfather      No Known Problems Maternal Grandmother      No Known Problems Maternal Grandfather       Social History[1]  Encounter Medications[2]       Objective:      /66   Pulse 102   Temp 97.5 °F (36.4 °C)   Ht 5' 8" (1.727 m)   SpO2 99%   BMI 21.12 kg/m²   Physical Exam  Vitals and nursing note reviewed.   Constitutional:       General: She is not in acute distress.  HENT:      Head: Normocephalic and atraumatic.   Cardiovascular:      Rate and Rhythm: Normal rate and regular rhythm.      Pulses: Normal pulses.      Heart sounds: No murmur heard.  Pulmonary:      Effort: Pulmonary effort is normal. No respiratory distress.      Breath sounds: Normal breath sounds. No wheezing or rhonchi.   Musculoskeletal:         General: No swelling or deformity.      Right lower leg: No edema.      Left lower leg: No edema.   Neurological:      General: No focal deficit present.      Mental Status: She is alert.      Cranial Nerves: No cranial nerve deficit.   Psychiatric:         Behavior: Behavior normal.         Thought Content: Thought content normal.        Physical Exam    Vitals: Blood pressure: 116/66.  Eyes: Right eye visual impairment.  MSK: Hand/Wrist - Right: Wrist decreased flexion (Right). Wrist flexor strength diminished (Right). Limited " active hand flexion (Right). Right hand edema.        Results for orders placed or performed in visit on 03/27/25   Urinalysis    Collection Time: 03/27/25 10:17 AM   Result Value Ref Range    Color, UA Yellow Straw, Marylin, Yellow, Light-Orange    Appearance, UA Clear Clear    pH, UA 6.0 5.0 - 8.0    Spec Grav UA 1.010 1.005 - 1.030    Protein, UA Negative Negative    Glucose, UA Negative Negative    Ketones, UA Negative Negative    Bilirubin, UA Negative Negative    Blood, UA Negative Negative    Nitrites, UA Positive (A) Negative    Urobilinogen, UA Negative <2.0 EU/dL    Leukocyte Esterase, UA 2+ (A) Negative   Protein/Creatinine Ratio, Urine    Collection Time: 03/27/25 10:17 AM   Result Value Ref Range    Urine Creatinine 44.0 15.0 - 325.0 mg/dL    Urine Protein <7 <=15 mg/dL    Urine Protein/Creatinine Ratio     Microalbumin/Creatinine Ratio, Urine    Collection Time: 03/27/25 10:17 AM   Result Value Ref Range    Urine Microalbumin 12.0   ug/mL    Urine Creatinine 46.0 15.0 - 325.0 mg/dL    Microalbumin/Creatinine Ratio Urine 26.1 <=30.0 ug/mg   Urinalysis Microscopic    Collection Time: 03/27/25 10:17 AM   Result Value Ref Range    RBC, UA 1 0 - 4 /HPF    WBC, UA 12 (H) 0 - 5 /HPF    Bacteria, UA Occasional None, Rare, Occasional /HPF    Squamous Epithelial Cells, UA 1 /HPF    Microscopic Comment       Assessment/Plan:         1. Hospital discharge follow-up    2. Controlled type 2 diabetes mellitus with other specified complication, without long-term current use of insulin    3. Essential hypertension    4. DM type 2 with diabetic peripheral neuropathy    5. Hypertension associated with type 2 diabetes mellitus    6. Other iron deficiency anemia    7. Vitamin D deficiency    8. History of CVA with residual deficit    9. Insomnia, controlled    10. Mixed hyperlipidemia    11. Acute cystitis without hematuria    12. Anticoagulation therapy continued upon discharge    13. Requires continuous supervision for  activities of daily living (ADL)      Hospital discharge follow-up  Comments:  Doing well, strength and endurance is improving . HH to draw cmp and cbc in 2 weeks.    Controlled type 2 diabetes mellitus with other specified complication, without long-term current use of insulin  Comments:  A1c is 7.5, controlled, no change in med  Orders:  -     glimepiride (AMARYL) 4 MG tablet; Take 1 tablet (4 mg total) by mouth before breakfast.  Dispense: 90 tablet; Refill: 3    Essential hypertension  Comments:  -Had low BP while admitted and see med change today. Cont to monitor and hold med if SBP less than 100 or DBP less than 60  Orders:  -     olmesartan (BENICAR) 20 MG tablet; Take 1 tablet (20 mg total) by mouth once daily.  Dispense: 90 tablet; Refill: 3  -     amLODIPine (NORVASC) 2.5 MG tablet; Take 1 tablet (2.5 mg total) by mouth once daily.  Dispense: 90 tablet; Refill: 3  -     cloNIDine 0.3 mg/24 hr td ptwk (CATAPRES) 0.3 mg/24 hr; Place 1 patch onto the skin every 7 days.  Dispense: 12 patch; Refill: 3    DM type 2 with diabetic peripheral neuropathy  Comments:  with restless leg too, take Gabapentin 200mg at night, she denies sedation and 100mg in am.  Orders:  -     gabapentin (NEURONTIN) 100 MG capsule; Take one capsule po in am and Take 2 capsules po at night  Dispense: 90 capsule; Refill: 2    Hypertension associated with type 2 diabetes mellitus  Comments:  controlled  Orders:  -     linaGLIPtin (TRADJENTA) 5 mg Tab tablet; Take 1 tablet (5 mg total) by mouth once daily.  Dispense: 90 tablet; Refill: 3    Other iron deficiency anemia  Comments:  ok to hold med due to intolerable constipation as long she is intentionally eating iron rich foods and routine monitoring  Orders:  -     ferrous sulfate (IRON) 325 mg (65 mg iron) Tab tablet; Take 1 tablet (325 mg total) by mouth daily with breakfast.  Dispense: 90 tablet; Refill: 3    Vitamin D deficiency  Comments:  due to ckd 3, replete, Vit D 50 000 units  weekly  Orders:  -     ergocalciferol (ERGOCALCIFEROL) 50,000 unit Cap; Take 1 capsule (50,000 Units total) by mouth once a week.  Dispense: 12 capsule; Refill: 3    History of CVA with residual deficit  Comments:  reviewed MRI-left sided stroke noted, [facility] PT and HH  Orders:  -     aspirin (ECOTRIN) 81 MG EC tablet; Take 1 tablet (81 mg total) by mouth once daily.  Dispense: 90 tablet; Refill: 3  -     clopidogreL (PLAVIX) 75 mg tablet; Take 1 tablet (75 mg total) by mouth once daily.  Dispense: 30 tablet; Refill: 0    Insomnia, controlled  Comments:  new med started while in the hospital, mirtazapine-cont med.  Orders:  -     mirtazapine (REMERON) 7.5 MG Tab; Take 1 tablet (7.5 mg total) by mouth every evening.  Dispense: 90 tablet; Refill: 2    Mixed hyperlipidemia  -     atorvastatin (LIPITOR) 40 MG tablet; Take 1 tablet (40 mg total) by mouth every evening.  Dispense: 90 tablet; Refill: 3    Acute cystitis without hematuria  -     Urinalysis, Reflex to Urine Culture Urine, Clean Catch; Future; Expected date: 02/05/2026  -     sulfamethoxazole-trimethoprim 800-160mg (BACTRIM DS) 800-160 mg Tab; Take 1 tablet by mouth 2 (two) times daily. for 3 days  Dispense: 6 tablet; Refill: 0    Anticoagulation therapy continued upon discharge  Comments:  for a total of 90 days and then stop and stay on ASA only  Orders:  -     clopidogreL (PLAVIX) 75 mg tablet; Take 1 tablet (75 mg total) by mouth once daily.  Dispense: 30 tablet; Refill: 0    Requires continuous supervision for activities of daily living (ADL)  Comments:  since the stroke, family and insurance support needed, continue      I have reviewed all of the patient's clinical history available in care everywhere and Epic and have utilized this in my evaluation and management recommendations today.      Considered UTI risk due to recent hospitalization and rehabilitation stay.   Discussed relationship between UTIs and increased fatigue.   Evaluated need for iron  supplementation, opting to monitor through diet rather than immediate supplementation.   Assessed stroke-related deficits, including right-sided weakness and visual changes.   Reviewed A1C results (7.5) and considered diabetes management.    PLAN NOTE:   Adjusted BP medications: decreased amlodipine to 2.5 mg daily, decreased olmesartan to 20 mg daily. Skip BP medications if systolic pressure is below 100 or diastolic is below 60.   Educated on need for careful BP monitoring, especially when adjusting medications.   Started Bactrim for UTI.   Ordered urine culture and sensitivity test to be collected by home health in 2 weeks.   Continued gabapentin for leg pain management: 1 capsule in the morning, 2 capsules at night, with dosing adjustment to optimize pain control and sleep.   Explained importance of regular movement to prevent deconditioning.   Darshana to get up and walk around every 2 hours to maintain mobility and prevent deconditioning.   Darshana to continue physical therapy exercises, including squeezing a ball with the affected hand.   Recommend maintaining a diet rich in iron, including leafy greens and beets.   Follow up in 3 months.    PLAN SUMMARY:   Started Bactrim for UTI   Continue physical therapy exercises   Adjusted BP medications: amlodipine decreased to 2.5 mg daily, olmesartan decreased to 20 mg daily   Ordered urine culture and sensitivity test to be collected by home health in 2 weeks   Continued gabapentin for leg pain management: 1 capsule morning, 2 capsules night   Recommend iron-rich diet   Follow up in 3 months               Treatment options and alternatives were discussed with the patient. Patient was given ample time to ask questions. All questions were answered. Voices understanding and acceptance of this advice. Will call back if any further questions or concerns.         I spent a total of 60 minutes face to face and non-face to face on the date of this visit.This includes time  preparing to see the patient (eg, review of tests, notes), obtaining and/or reviewing additional history from an independent historian and/or outside medical records, documenting clinical information in the electronic health record, independently interpreting results and/or communicating results to the patient/family/caregiver, or care coordinator.  Visit today included increased complexity associated with the care of the episodic problem addressed and managing the longitudinal care of the patient due to the serious and/or complex managed problem(s).        This note was generated with the assistance of ambient listening technology. Verbal consent was obtained by the patient and accompanying visitor(s) for the recording of patient appointment to facilitate this note. I attest to having reviewed and edited the generated note for accuracy, though some syntax or spelling errors may persist. Please contact the author of this note for any clarification.            Antionette Son MD  [facility],            [1]   Social History  Socioeconomic History    Marital status:    Tobacco Use    Smoking status: Never     Passive exposure: Never    Smokeless tobacco: Never   Substance and Sexual Activity    Alcohol use: No    Drug use: No    Sexual activity: Not Currently     Partners: Male     Social Drivers of Health     Financial Resource Strain: Low Risk  (6/14/2025)    Received from [facility] and Its Subsidiaries and Affiliates    Overall Financial Resource Strain (CARDIA)     Difficulty of Paying Living Expenses: Not hard at all   Food Insecurity: No Food Insecurity (6/14/2025)    Received from [facility] and Its Subsidiaries and Affiliates    Hunger Vital Sign     Worried About Running Out of Food in the Last Year: Never true     Ran Out of Food in the Last Year: Never true   Transportation Needs: No  Transportation Needs (6/14/2025)    Received from [facility] and Its Subsidiaries and Affiliates    PRAPARE - Transportation     Lack of Transportation (Medical): No     Lack of Transportation (Non-Medical): No   Physical Activity: Insufficiently Active (8/5/2025)    Exercise Vital Sign     Days of Exercise per Week: 3 days     Minutes of Exercise per Session: 30 min   Stress: No Stress Concern Present (1/16/2025)    [place] [employer] [place] [employer] - Occupational Stress Questionnaire     Feeling of Stress : Not at all   Housing Stability: Low Risk  (6/14/2025)    Received from [facility] and Its Subsidiaries and Affiliates    Housing Stability Vital Sign     Unable to Pay for Housing in the Last Year: No     Number of Times Moved in the Last Year: 0     Homeless in the Last Year: No   [2]   Outpatient Encounter Medications as of 8/5/2025   Medication Sig Dispense Refill    [DISCONTINUED] amLODIPine (NORVASC) 10 MG tablet Take 1 tablet (10 mg total) by mouth once daily. 90 tablet 3    [DISCONTINUED] aspirin (ECOTRIN) 81 MG EC tablet Take 81 mg by mouth.      [DISCONTINUED] aspirin 81 MG Chew Take 81 mg by mouth every morning.      [DISCONTINUED] atorvastatin (LIPITOR) 20 MG tablet Take 1 tablet (20 mg total) by mouth every evening. (Patient taking differently: Take 40 mg by mouth every evening.) 90 tablet 3    [DISCONTINUED] atorvastatin (LIPITOR) 40 MG tablet Take 40 mg by mouth every evening.      [DISCONTINUED] cloNIDine 0.3 mg/24 hr td ptwk (CATAPRES) 0.3 mg/24 hr Place 1 patch onto the skin every 7 days. 12 patch 3    [DISCONTINUED] clopidogreL (PLAVIX) 75 mg tablet Take 75 mg by mouth.      [DISCONTINUED] gabapentin (NEURONTIN) 100 MG capsule TAKE 1 CAPSULE BY MOUTH 2 TIMES DAILY. 60 capsule 0    [DISCONTINUED] glimepiride (AMARYL) 4 MG tablet Take 1 tablet (4 mg total) by mouth before breakfast. 90 tablet 3    [DISCONTINUED]  linaGLIPtin (TRADJENTA) 5 mg Tab tablet Take 1 tablet (5 mg total) by mouth once daily. 90 tablet 3    [DISCONTINUED] mirtazapine (REMERON) 7.5 MG Tab Take 7.5 mg by mouth every evening.      amLODIPine (NORVASC) 2.5 MG tablet Take 1 tablet (2.5 mg total) by mouth once daily. 90 tablet 3    ammonium lactate (LAC-HYDRIN) 12 % lotion Apply topically as needed for Dry Skin. 400 mL 3    aspirin (ECOTRIN) 81 MG EC tablet Take 1 tablet (81 mg total) by mouth once daily. 90 tablet 3    atorvastatin (LIPITOR) 40 MG tablet Take 1 tablet (40 mg total) by mouth every evening. 90 tablet 3    ciclopirox (LOPROX) 0.77 % Crea Apply topically 2 (two) times daily. 90 g 1    cloNIDine 0.3 mg/24 hr td ptwk (CATAPRES) 0.3 mg/24 hr Place 1 patch onto the skin every 7 days. 12 patch 3    [START ON 8/15/2025] clopidogreL (PLAVIX) 75 mg tablet Take 1 tablet (75 mg total) by mouth once daily. 30 tablet 0    ergocalciferol (ERGOCALCIFEROL) 50,000 unit Cap Take 1 capsule (50,000 Units total) by mouth once a week. 12 capsule 3    ferrous sulfate (IRON) 325 mg (65 mg iron) Tab tablet Take 1 tablet (325 mg total) by mouth daily with breakfast. 90 tablet 3    gabapentin (NEURONTIN) 100 MG capsule Take one capsule po in am and Take 2 capsules po at night 90 capsule 2    glimepiride (AMARYL) 4 MG tablet Take 1 tablet (4 mg total) by mouth before breakfast. 90 tablet 3    linaGLIPtin (TRADJENTA) 5 mg Tab tablet Take 1 tablet (5 mg total) by mouth once daily. 90 tablet 3    miconazole (MICOTIN) 2 % cream Apply topically 2 (two) times daily. 106 g 2    mirtazapine (REMERON) 7.5 MG Tab Take 1 tablet (7.5 mg total) by mouth every evening. 90 tablet 2    olmesartan (BENICAR) 20 MG tablet Take 1 tablet (20 mg total) by mouth once daily. 90 tablet 3    sulfamethoxazole-trimethoprim 800-160mg (BACTRIM DS) 800-160 mg Tab Take 1 tablet by mouth 2 (two) times daily. for 3 days 6 tablet 0    [DISCONTINUED] cloNIDine 0.3 mg/24 hr td ptwk (CATAPRES) 0.3 mg/24 hr  Place 1 patch onto the skin every 7 days.      [DISCONTINUED] ergocalciferol (ERGOCALCIFEROL) 50,000 unit Cap Take 1 capsule (50,000 Units total) by mouth once a week. (Patient not taking: Reported on 8/5/2025) 12 capsule 3    [DISCONTINUED] ferrous sulfate (IRON) 325 mg (65 mg iron) Tab tablet Take 1 tablet (325 mg total) by mouth daily with breakfast. (Patient not taking: Reported on 8/5/2025) 90 tablet 3    [DISCONTINUED] glimepiride (AMARYL) 4 MG tablet Take 4 mg by mouth every morning.      [DISCONTINUED] hydrALAZINE (APRESOLINE) 25 MG tablet Take 1 tablet (25 mg total) by mouth 2 (two) times daily. (Patient not taking: Reported on 8/5/2025) 180 tablet 3    [DISCONTINUED] linaGLIPtin (TRADJENTA) 5 mg Tab tablet Take 5 mg by mouth every morning.      [DISCONTINUED] olmesartan (BENICAR) 40 MG tablet Take 1 tablet (40 mg total) by mouth once daily. (Patient not taking: Reported on 8/5/2025) 90 tablet 3     No facility-administered encounter medications on file as of 8/5/2025.

## 2025-08-06 ENCOUNTER — TELEPHONE (OUTPATIENT)
Dept: PRIMARY CARE CLINIC | Facility: CLINIC | Age: 85
End: 2025-08-06
Payer: MEDICARE

## 2025-08-06 NOTE — TELEPHONE ENCOUNTER
Spoke with daughter she states she wants to keep antibiotic a delivery pharmacy to send to them. Called pharmacy to confirm